# Patient Record
Sex: MALE | Race: WHITE | Employment: OTHER | URBAN - METROPOLITAN AREA
[De-identification: names, ages, dates, MRNs, and addresses within clinical notes are randomized per-mention and may not be internally consistent; named-entity substitution may affect disease eponyms.]

---

## 2017-02-27 PROBLEM — E11.65 TYPE 2 DIABETES MELLITUS WITH HYPERGLYCEMIA, WITHOUT LONG-TERM CURRENT USE OF INSULIN (HCC): Status: ACTIVE | Noted: 2017-02-27

## 2019-08-06 ENCOUNTER — HOME HEALTH ADMISSION (OUTPATIENT)
Dept: HOME HEALTH SERVICES | Facility: HOME HEALTH | Age: 54
End: 2019-08-06
Payer: COMMERCIAL

## 2019-08-06 ENCOUNTER — HOSPITAL ENCOUNTER (OUTPATIENT)
Dept: PHYSICAL THERAPY | Age: 54
Discharge: HOME | End: 2019-08-06
Payer: COMMERCIAL

## 2019-08-06 ENCOUNTER — HOSPITAL ENCOUNTER (OUTPATIENT)
Dept: SURGERY | Age: 54
Discharge: HOME | End: 2019-08-06
Payer: COMMERCIAL

## 2019-08-06 VITALS
WEIGHT: 254.1 LBS | DIASTOLIC BLOOD PRESSURE: 81 MMHG | HEIGHT: 69 IN | BODY MASS INDEX: 37.64 KG/M2 | OXYGEN SATURATION: 95 % | TEMPERATURE: 97.1 F | RESPIRATION RATE: 16 BRPM | SYSTOLIC BLOOD PRESSURE: 129 MMHG | HEART RATE: 82 BPM

## 2019-08-06 DIAGNOSIS — R06.83 SNORING: Primary | ICD-10-CM

## 2019-08-06 LAB
ANION GAP SERPL CALC-SCNC: 9 MMOL/L (ref 7–16)
APTT PPP: 30.2 SEC (ref 24.7–39.8)
ATRIAL RATE: 82 BPM
BACTERIA SPEC CULT: NORMAL
BASOPHILS # BLD: 0 K/UL (ref 0–0.2)
BASOPHILS NFR BLD: 0 % (ref 0–2)
BUN SERPL-MCNC: 12 MG/DL (ref 6–23)
CALCIUM SERPL-MCNC: 10 MG/DL (ref 8.3–10.4)
CALCULATED P AXIS, ECG09: 56 DEGREES
CALCULATED R AXIS, ECG10: 72 DEGREES
CALCULATED T AXIS, ECG11: 58 DEGREES
CHLORIDE SERPL-SCNC: 100 MMOL/L (ref 98–107)
CO2 SERPL-SCNC: 30 MMOL/L (ref 21–32)
CREAT SERPL-MCNC: 1.14 MG/DL (ref 0.8–1.5)
DIAGNOSIS, 93000: NORMAL
DIFFERENTIAL METHOD BLD: NORMAL
EOSINOPHIL # BLD: 0.2 K/UL (ref 0–0.8)
EOSINOPHIL NFR BLD: 3 % (ref 0.5–7.8)
ERYTHROCYTE [DISTWIDTH] IN BLOOD BY AUTOMATED COUNT: 12.6 % (ref 11.9–14.6)
GLUCOSE SERPL-MCNC: 166 MG/DL (ref 65–100)
HCT VFR BLD AUTO: 42.8 % (ref 41.1–50.3)
HGB BLD-MCNC: 13.9 G/DL (ref 13.6–17.2)
IMM GRANULOCYTES # BLD AUTO: 0 K/UL (ref 0–0.5)
IMM GRANULOCYTES NFR BLD AUTO: 0 % (ref 0–5)
INR PPP: 1.1
LYMPHOCYTES # BLD: 2.1 K/UL (ref 0.5–4.6)
LYMPHOCYTES NFR BLD: 23 % (ref 13–44)
MCH RBC QN AUTO: 28 PG (ref 26.1–32.9)
MCHC RBC AUTO-ENTMCNC: 32.5 G/DL (ref 31.4–35)
MCV RBC AUTO: 86.3 FL (ref 79.6–97.8)
MONOCYTES # BLD: 0.6 K/UL (ref 0.1–1.3)
MONOCYTES NFR BLD: 7 % (ref 4–12)
NEUTS SEG # BLD: 6.2 K/UL (ref 1.7–8.2)
NEUTS SEG NFR BLD: 68 % (ref 43–78)
NRBC # BLD: 0 K/UL (ref 0–0.2)
P-R INTERVAL, ECG05: 176 MS
PLATELET # BLD AUTO: 404 K/UL (ref 150–450)
PMV BLD AUTO: 9.5 FL (ref 9.4–12.3)
POTASSIUM SERPL-SCNC: 3.8 MMOL/L (ref 3.5–5.1)
PROTHROMBIN TIME: 14 SEC (ref 11.7–14.5)
Q-T INTERVAL, ECG07: 352 MS
QRS DURATION, ECG06: 92 MS
QTC CALCULATION (BEZET), ECG08: 411 MS
RBC # BLD AUTO: 4.96 M/UL (ref 4.23–5.6)
SERVICE CMNT-IMP: NORMAL
SODIUM SERPL-SCNC: 139 MMOL/L (ref 136–145)
VENTRICULAR RATE, ECG03: 82 BPM
WBC # BLD AUTO: 9.1 K/UL (ref 4.3–11.1)

## 2019-08-06 PROCEDURE — 87641 MR-STAPH DNA AMP PROBE: CPT

## 2019-08-06 PROCEDURE — 85730 THROMBOPLASTIN TIME PARTIAL: CPT

## 2019-08-06 PROCEDURE — 36415 COLL VENOUS BLD VENIPUNCTURE: CPT

## 2019-08-06 PROCEDURE — 93005 ELECTROCARDIOGRAM TRACING: CPT | Performed by: PHYSICIAN ASSISTANT

## 2019-08-06 PROCEDURE — 80048 BASIC METABOLIC PNL TOTAL CA: CPT

## 2019-08-06 PROCEDURE — 77030027138 HC INCENT SPIROMETER -A

## 2019-08-06 PROCEDURE — 85610 PROTHROMBIN TIME: CPT

## 2019-08-06 PROCEDURE — 97161 PT EVAL LOW COMPLEX 20 MIN: CPT

## 2019-08-06 PROCEDURE — 85025 COMPLETE CBC W/AUTO DIFF WBC: CPT

## 2019-08-06 RX ORDER — ACETAMINOPHEN, DIPHENHYDRAMINE HCL, PHENYLEPHRINE HCL 325; 25; 5 MG/1; MG/1; MG/1
1 TABLET ORAL
COMMUNITY

## 2019-08-06 RX ORDER — CHOLECALCIFEROL (VITAMIN D3) 125 MCG
1 CAPSULE ORAL DAILY
COMMUNITY
End: 2019-08-28

## 2019-08-06 RX ORDER — BISMUTH SUBSALICYLATE 262 MG
1 TABLET,CHEWABLE ORAL DAILY
COMMUNITY
End: 2019-08-28

## 2019-08-06 NOTE — PERIOP NOTES
Lab results within anesthesia guidelines. Lab results sent to PCP per surgeon's request.       Recent Results (from the past 12 hour(s))   CBC WITH AUTOMATED DIFF    Collection Time: 08/06/19  8:55 AM   Result Value Ref Range    WBC 9.1 4.3 - 11.1 K/uL    RBC 4.96 4.23 - 5.6 M/uL    HGB 13.9 13.6 - 17.2 g/dL    HCT 42.8 41.1 - 50.3 %    MCV 86.3 79.6 - 97.8 FL    MCH 28.0 26.1 - 32.9 PG    MCHC 32.5 31.4 - 35.0 g/dL    RDW 12.6 11.9 - 14.6 %    PLATELET 180 669 - 859 K/uL    MPV 9.5 9.4 - 12.3 FL    ABSOLUTE NRBC 0.00 0.0 - 0.2 K/uL    DF AUTOMATED      NEUTROPHILS 68 43 - 78 %    LYMPHOCYTES 23 13 - 44 %    MONOCYTES 7 4.0 - 12.0 %    EOSINOPHILS 3 0.5 - 7.8 %    BASOPHILS 0 0.0 - 2.0 %    IMMATURE GRANULOCYTES 0 0.0 - 5.0 %    ABS. NEUTROPHILS 6.2 1.7 - 8.2 K/UL    ABS. LYMPHOCYTES 2.1 0.5 - 4.6 K/UL    ABS. MONOCYTES 0.6 0.1 - 1.3 K/UL    ABS. EOSINOPHILS 0.2 0.0 - 0.8 K/UL    ABS. BASOPHILS 0.0 0.0 - 0.2 K/UL    ABS. IMM.  GRANS. 0.0 0.0 - 0.5 K/UL   METABOLIC PANEL, BASIC    Collection Time: 08/06/19  8:55 AM   Result Value Ref Range    Sodium 139 136 - 145 mmol/L    Potassium 3.8 3.5 - 5.1 mmol/L    Chloride 100 98 - 107 mmol/L    CO2 30 21 - 32 mmol/L    Anion gap 9 7 - 16 mmol/L    Glucose 166 (H) 65 - 100 mg/dL    BUN 12 6 - 23 MG/DL    Creatinine 1.14 0.8 - 1.5 MG/DL    GFR est AA >60 >60 ml/min/1.73m2    GFR est non-AA >60 >60 ml/min/1.73m2    Calcium 10.0 8.3 - 10.4 MG/DL   PROTHROMBIN TIME + INR    Collection Time: 08/06/19  8:55 AM   Result Value Ref Range    Prothrombin time 14.0 11.7 - 14.5 sec    INR 1.1     PTT    Collection Time: 08/06/19  8:55 AM   Result Value Ref Range    aPTT 30.2 24.7 - 39.8 SEC   EKG, 12 LEAD, INITIAL    Collection Time: 08/06/19 10:04 AM   Result Value Ref Range    Ventricular Rate 82 BPM    Atrial Rate 82 BPM    P-R Interval 176 ms    QRS Duration 92 ms    Q-T Interval 352 ms    QTC Calculation (Bezet) 411 ms    Calculated P Axis 56 degrees    Calculated R Axis 72 degrees Calculated T Axis 58 degrees    Diagnosis       Normal sinus rhythm  Normal ECG  When compared with ECG of 06-AUG-2019 10:03,  No significant change was found

## 2019-08-06 NOTE — PROGRESS NOTES
08/06/19 0900   Oxygen Therapy   O2 Sat (%) 96 %   Pulse via Oximetry 90 beats per minute   O2 Device Room air   Pre-Treatment   Breath Sounds Bilateral Clear;Diminished   Pre FEV1 (liters) 2.8 liters   % Predicted 76   Incentive Spirometry Treatment   Actual Volume (ml) 2500 ml   Sleep Disorder Breathing Screen:     Patient reports symptoms of:   · Snoring   · Excessive daytime sleepiness with napping  · Observed apnea  · FREIDMAN 4  · STOP-BANG __8__  · SACS Score _58___  · Height_5'9\"____ Weight__254 lbs___  · FREIDMAN 4  · PREVIOUS DIAGNOSIS OF JULIANE     Refer patient for sleep study based on above assessment. Initial respiratory Assessment completed with pt. Pt was interviewed and evaluated in Joint camp prior to surgery. Patient ID:  Marcio Kumar  [id number]  65 y.o.  1965  Surgeon: Dr. Onesimo Nickerson  Date of Surgery: 8/27/2019  Procedure: Total Right Knee Arthroplasty  Primary Care Physician: Arely Cowan [place], [place] [phone]  Specialists:                                  Pt instructed in the use of Incentive Spirometry. Pt instructed to bring Incentive Spirometer back on date of surgery & to start using Is upon return to pt room. Pt taught proper cough technique    History of smoking:   DENIES                                                      Quit date:           Secondhand smoke:DENIES      Past procedures with Oxygen desaturation:ARRYTHMIAS AFTER SURGERY    Past Medical History:   Diagnosis Date    Arrhythmia 2009-- per pt caused by dilaudid    Pt went into An arrythmia after surgery,states caused by pain meds.  does not see cardiologist.   Verba Bright Arthritis     knees, neck    Diabetes mellitus, type 2 (HCC)     Oral meds, Average BS- 155, Last A1C 7.5 on 6/20/19, denies hypoglycemia    Elevated cholesterol     GERD (gastroesophageal reflux disease)     well controlled by zantac on a prn basis    History of kidney stones 2002    only x 1    Hypertension     controlled by medication    Morbid obesity ([address]     bmi=38    PUD (peptic ulcer disease) 2003    Sleep apnea     pt does not use CPAP had uppp 2009    Testosterone deficiency     controlled by medication    Vitamin D deficiency 06/11/2014    [facility] (Level 18)                                                HX OF PSA                                                                                                     Respiratory history:DENIES SOB                                                                   Respiratory meds:  DENIES                                       FAMILY PRESENT:            SPOUSE,                                                                                        PAST SLEEP STUDY:        YES                    HX OF JULIANE:                        YES                PT HAD UPPP  BUT NEVER HAD FOLLOW UP STUDY                                     JULIANE assessment:                                               SLEEPS ON SIDE          AND              STOMACH                                                 PHYSICAL EXAM   Body mass index is 37.52 kg/m².    Visit Vitals  /81 (BP 1 Location: Right arm, BP Patient Position: At rest;Sitting)   Pulse 82   Temp 97.1 °F (36.2 °C)   Resp 16   Ht 5' 9\" (1.753 m)   Wt 115.3 kg (254 lb 1.6 oz)   SpO2 95%   BMI 37.52 kg/m²     Neck circumference:   45   cm    Loud snoring:        YES                        NOT AS LOUD AS BEFORE UPPP    Witnessed apnea or wakening gasping or choking:,            HX OF                                                                                                APNEA    Awakens with headaches:                                                  DENIES    Morning or daytime tiredness/ sleepiness:                                                                                                       TIRED   Dry mouth or sore throat in morning:                YES                                                                        Mendoza stage: 4    SACS score:58      Stop Bang   STOP-BANG  Does the patient snore loudly (louder than talking or loud enough to be heard through closed doors)?: Yes  Does the patient often feel tired, fatigued, or sleepy during the daytime, even after a \"good\" night's sleep?: Yes  Has anyone ever observed the patient stop breathing during their sleep? : Yes  Does the patient have or are they being treated for high blood pressure?: Yes  Is the patient's BMI greater than 35?: Yes  Is your neck circumference greater than 17 inches (Male) or 16 inches (Female)?: Yes  Is the patient older than 48?: Yes  Is the patient male?: Yes  JULIANE Score: 8  Has the patient been referred to Sleep Medicine?: Yes  Has the patient previously been diagnosed with Obstructive Sleep Apnea?: Yes  Treated or Untreated?: Untreated                            CPAP:                       NONE                                         O2 AT 3 HS       CONT SAT HS            Referrals:  HST  Pt.  Phone Number:  [phone]

## 2019-08-06 NOTE — PROGRESS NOTES
SW met with pt in Prehab to discuss Right TKA scheduled for 8/27/19. Pt plans to return home with spouse and HHPT. Pt resides in [facility] and is agreeable to [facility] [place]. [facility] [place] order completed. Pt has RW and does not anticipate the need for a BSC. Pt aware that SW does not need to see her post op but if needs arise he can request to meet with SW. No additional needs or questions identified at this time.   Gabriella Umaña

## 2019-08-06 NOTE — PERIOP NOTES
Patient verified name and . Order for consent found in EHR and matches case posting; patient verified. Right total knee arthroplasty    Type 3 surgery, PAT Joint assessment complete. Labs per surgeon: cbc, bmp, PT, PTT, MRSA nasal swab; results pending. Labs per anesthesia protocol: no additional lab work needed. EKG:Done today- within anesthesia guidelines. MRSA/MSSA swab collected; pharmacy to review and dose antibiotic as appropriate. Hospital approved surgical skin cleanser and instructions to return bottle on DOS given per hospital policy. Patient provided with handouts including Guide to Surgery, Pain Management, Hand Hygiene, Blood Transfusion Education, and [facility] Brochure. Patient answered medical/surgical history questions at their best of ability. All prior to admission medications documented in [facility]. Original medication prescription bottle was visualized during patient appointment. Patient instructed to hold all vitamins/supplements 7 days prior to surgery and NSAIDS 5 days prior to surgery. Patient instructed to continue previous medications as prescribed prior to surgery and to take the following medications the day of surgery according to anesthesia guidelines with a small sip of water: propanolol, rosuvastatin and testosterone gel. Patient teach back successful and patient demonstrates knowledge of instruction.

## 2019-08-06 NOTE — PROGRESS NOTES
Timur David  : [birth year](89 y.o.) Joint Annie Lobe at [address], [facility].  [phone]       Physical Therapy Prehab Plan of Treatment and Evaluation Summary:2019    ICD-10: Treatment Diagnosis:   · Pain in Right Knee (M25.561)  · Stiffness of Right Knee, Not elsewhere classified (M25.661)  · Difficulty in walking, Not elsewhere classified (R26.2)  Precautions/Allergies:   Crestor [rosuvastatin]; Dilaudid [hydromorphone (bulk)]; and Tylox [oxycodone-acetaminophen]  MEDICAL/REFERRING DIAGNOSIS:  Unilateral primary osteoarthritis, right knee [M17.11]  REFERRING PHYSICIAN: Dhiraj Vasquez,*  DATE OF SURGERY: 19    Assessment:   Comments:  Pt. Plans to go home with spouse. He needs a left tka as well. PROBLEM LIST (Impacting functional limitations):  Mr. Darling Esteban presents with the following right lower extremity(s) problems:  1. Strength  2. Range of Motion  3. Home Exercise Program  4. Pain   INTERVENTIONS PLANNED:  1. Home Exercise Program  2. Educational Discussion      TREATMENT PLAN: Effective Dates: 2019 TO 2019. Frequency/Duration: Patient to continue to perform home exercise program at least twice per day up until his surgery. GOALS: (Goals have been discussed and agreed upon with patient.)  Discharge Goals: Time Frame: 1 Day  1. Patient will demonstrate independence with a home exercise program designed to increase strength, range of motion and pain control to minimize functional deficits and optimize patient for total joint replacement. Rehabilitation Potential For Stated Goals: Good  Regarding Shruti Hamm therapy, I certify that the treatment plan above will be carried out by a therapist or under their direction.   Thank you for this referral,  Amy Walden, PT               HISTORY:   Present Symptoms:  Pain Intensity 1: (10 at worst)  Pain Location 1: Knee   History of Present Injury/Illness (Reason for Referral):  Medical/Referring Diagnosis: Unilateral primary osteoarthritis, right knee [M17.11]   Past Medical History/Comorbidities:   Mr. Fawad Moralez  has a past medical history of Arrhythmia (2009-- per pt caused by dilaudid), Arthritis, Diabetes mellitus, type 2 ([facility] [address] Elevated cholesterol, GERD (gastroesophageal reflux disease), History of kidney stones (2002), Hypertension, Morbid obesity ([facility] [address] PUD (peptic ulcer disease) (2003), Sleep apnea, Testosterone deficiency, and Vitamin D deficiency (06/11/2014). Mr. Fawad Moralez  has a past surgical history that includes hx colonoscopy (07/17/2015); pr neurological procedure unlisted (3/2011 at [facility]); hx orthopaedic; hx heent (2009); and hx gi (early 1990s).   Social History/Living Environment:   Home Environment: Private residence  # Steps to Enter: 6  Rails to Enter: No  One/Two Story Residence: One story  Living Alone: No  Support Systems: Spouse/Significant Other/Partner  Patient Expects to be Discharged to[de-identified] Private residence  Current DME Used/Available at Home: Other (comment), Cane, straight, Grab bars(high commode)  Tub or Shower Type: Tub/Shower combination  Work/Activity:  retired  Dominant Side:  RIGHT  Current Medications:  See Pre-assessment nursing note   Number of Personal Factors/Comorbidities that affect the Plan of Care: 1-2: MODERATE COMPLEXITY   EXAMINATION:   ADLs (Current Functional Status):   Ambulation:  [x] Independent  [] Walk Indoors Only  [] Walk Outdoors  [] Use Assistive Device  [] Use Wheelchair Only Dressing:  [x] [address] from Someone for:  [] Sock/Shoes  [] Pants  [] Everything   Bathing/Showering:   [x] Independent  [] Requires Assistance from Someone  [] [address]:  [] Routine house and yard work  [] Light Housework Only  [x] None   Observation/Orthostatic Postural Assessment:   Exceptions to Satmex shoulders  ROM/Flexibility:   Gross Assessment: Yes  AROM: Within functional limits(left LE)                       RLE Assessment  RLE Assessment (WDL): Exceptions to WDL  RLE AROM  R Knee Flexion: 125  R Knee Extension: 5   Strength:   Gross Assessment: Yes  Strength: Generally decreased, functional(left LE)              RLE Strength  R Knee Flexion: 4  R Knee Extension: 4   Functional Mobility:    Gross Assessment: Yes    Gait Description (WDL): Exceptions to WDL  Stand to Sit: Independent  Sit to Stand: Independent  Distance (ft): 500 Feet (ft)  Ambulation - Level of Assistance: Independent  Stance: Right decreased  Gait Abnormalities: Antalgic          Balance:    Sitting: Intact  Standing: Intact   Body Structures Involved:  1. Bones  2. Joints  3. Muscles  4. Ligaments Body Functions Affected:  1. Movement Related Activities and Participation Affected:  1. Mobility   Number of elements that affect the Plan of Care: 1-2: LOW COMPLEXITY   CLINICAL PRESENTATION:   Presentation: Stable and uncomplicated: LOW COMPLEXITY   CLINICAL DECISION MAKING:   Outcome Measure: Tool Used: Lower Extremity Functional Scale (LEFS)  Score:  Initial: 26/80 Most Recent: X/80 (Date: -- )   Interpretation of Score: 20 questions each scored on a 5 point scale with 0 representing \"extreme difficulty or unable to perform\" and 4 representing \"no difficulty\". The lower the score, the greater the functional disability. 80/80 represents no disability. Minimal detectable change is 9 points. Medical Necessity:   · Mr. Shannon Thompson is expected to optimize his lower extremity strength and ROM in preparation for joint replacement surgery. Reason for Services/Other Comments:  · Achieve baseline assesment of musculoskeletal system, functional mobility and home environment. , educate in PT HEP in preparation for surgery, educate in hospital plan of care.    Use of outcome tool(s) and clinical judgement create a POC that gives a: Clear prediction of patient's progress: LOW COMPLEXITY   TREATMENT:   Treatment/Session Assessment:  Patient was instructed in PT- HEP to increase strength and ROM in LEs. Answered all questions. · Post session pain:  Knee pain  · Compliance with Program/Exercises: compliant most of the time.   Total Treatment Duration:  PT Patient Time In/Time Out  Time In: 0845  Time Out: [facility],

## 2019-08-19 PROBLEM — R06.83 SNORING: Status: ACTIVE | Noted: 2019-08-19

## 2019-08-19 NOTE — ADVANCED PRACTICE NURSE
Total Joint Surgery Preoperative Chart Review      Patient ID:  Shayla Keller  [id number]  61 y.o.  1965  Surgeon: Dr. Thee Layton  Date of Surgery: 8/27/2019  Procedure: Total Right Knee Arthroplasty  Primary Care Physician: Mary Jane Mckee [place] [phone]  Specialty Physician(s):      Subjective:   Shayla Keller is a 53 y.o. WHITE OR  male who presents for preoperative evaluation for Total Right Knee arthroplasty. This is a preoperative chart review note based on data collected by the nurse at the surgical Pre-Assessment visit. Past Medical History:   Diagnosis Date    Arrhythmia 2009-- per pt caused by dilaudid    Pt went into An arrythmia after surgery,states caused by pain meds. does not see cardiologist.   Pricila Keron Arthritis     knees, neck    Diabetes mellitus, type 2 (HCC)     Oral meds, Average BS- 155, Last A1C 7.5 on 6/20/19, denies hypoglycemia    Elevated cholesterol     GERD (gastroesophageal reflux disease)     well controlled by zantac on a prn basis    History of kidney stones 2002    only x 1    Hypertension     controlled by medication    Morbid obesity ([address]     bmi=38    PUD (peptic ulcer disease) 2003    Sleep apnea     pt does not use CPAP had uppp 2009    Testosterone deficiency     controlled by medication    Vitamin D deficiency 06/11/2014    [facility] (Level 18)      Past Surgical History:   Procedure Laterality Date    HX COLONOSCOPY  07/17/2015    Dr. Ita Phan in 10 years.     HX GI  early 1990s    colonoscopy and EGD    HX HEENT  2009    UPPP, for obstructive sleep apnea    HX ORTHOPAEDIC      left knee scope x 2    NEUROLOGICAL PROCEDURE UNLISTED  3/2011 at [facility]    neck fusion C4-C5 with plate      Family History   Problem Relation Age of Onset    Hypertension Mother     Hypertension Father     Cancer Sister         breast    Heart Disease Sister     Stroke Sister     Malignant Hyperthermia Neg Hx     Pseudocholinesterase Deficiency Neg Hx     Delayed Awakening Neg Hx     Post-op Nausea/Vomiting Neg Hx     Emergence Delirium Neg Hx     Post-op Cognitive Dysfunction Neg Hx     Other Neg Hx     Colon Cancer Neg Hx       Social History     Tobacco Use    Smoking status: Never Smoker    Smokeless tobacco: Never Used   Substance Use Topics    Alcohol use: No       Prior to Admission medications    Medication Sig Start Date End Date Taking? Authorizing Provider   multivitamin (ONE A DAY) tablet Take 1 Tab by mouth daily. Yes Provider, Historical   TURMERIC PO Take 1,000 mg by mouth daily. Yes Provider, Historical   cholecalciferol, vitamin D3, (VITAMIN D3) 2,000 unit tab Take 1 Tab by mouth daily. Yes Provider, Historical   melatonin 10 mg tab Take 1 Tab by mouth nightly. Yes Provider, Historical   testosterone (ANDROGEL) 20.25 mg/1.25 gram (1.62 %) gel APPLY 4 PUMPS EVERYDAY  Patient taking differently: APPLY 2 PUMPS EVERY MORNING AND 2 PUMPS NIGHTLY 6/27/19  Yes Denyce Abdulaziz P, DO   Fenofibrate (LIPOFEN) 150 mg cap Take 150 mg by mouth every morning for 30 days. 6/27/19 8/6/19 Yes Denyce Abdulaziz P, DO   SITagliptin-metFORMIN (JANUMET XR) 50-1,000 mg TM24 1 p.o. twice daily 6/27/19  Yes Denyce Abdulaziz P, DO   enalapril-hydroCHLOROthiazide (VASERETIC) 10-25 mg tablet Take 1 Tab by mouth daily for 30 days. 12/20/18 8/6/19 Yes Harsha Jamison P, DO   rosuvastatin (CRESTOR) 40 mg tablet Take 1 Tab by mouth daily for 30 days. Patient taking differently: Take 20 mg by mouth daily. 12/20/18 8/6/19 Yes Denyce Abdulaziz P, DO   propranolol (INDERAL) 40 mg tablet Take 2 Tabs by mouth two (2) times a day. Patient taking differently: Take 80 mg by mouth daily. 12/20/18  Yes Denyce Abdulaziz P, DO   fluticasone (FLONASE) 50 mcg/actuation nasal spray 2 Sprays by Both Nostrils route daily. Patient taking differently: 2 Sprays by Both Nostrils route daily as needed.  2/27/17  Yes Denyce Abdulaziz P, DO   coenzyme q10 100 mg Cap Take 100 mg by mouth nightly. Indications: OTC   Yes Provider, Historical     Allergies   Allergen Reactions    Crestor [Rosuvastatin] Myalgia    Dilaudid [Hydromorphone (Bulk)] Other (comments)     Caused pt to go into A FIB for short time    Tylox [Oxycodone-Acetaminophen] Other (comments)     \"aggitation\"           Objective:     Physical Exam:   No data found. ECG:    EKG Results     Procedure 720 Value Units Date/Time    EKG, 12 LEAD, INITIAL [[id number]] Collected:  19 1004    Order Status:  Completed Updated:  19 1429     Ventricular Rate 82 BPM      Atrial Rate 82 BPM      P-R Interval 176 ms      QRS Duration 92 ms      Q-T Interval 352 ms      QTC Calculation (Bezet) 411 ms      Calculated P Axis 56 degrees      Calculated R Axis 72 degrees      Calculated T Axis 58 degrees      Diagnosis --     Normal sinus rhythm  Normal ECG  When compared with ECG of 06-AUG-2019 10:03,  No significant change was found  Confirmed by [facility]  MD (), Han Martines ([id number]) on 2019 2:29:48 PM            Data Review:   Labs:     Results for Mitchael Apgar" (MRN [id number]) as of 2019 14:17   Ref. Range 2019 08:55   Sodium Latest Ref Range: 136 - 145 mmol/L 139   Potassium Latest Ref Range: 3.5 - 5.1 mmol/L 3.8   Chloride Latest Ref Range: 98 - 107 mmol/L 100   CO2 Latest Ref Range: 21 - 32 mmol/L 30   Anion gap Latest Ref Range: 7 - 16 mmol/L 9   Glucose Latest Ref Range: 65 - 100 mg/dL 166 (H)   BUN Latest Ref Range: 6 - 23 MG/DL 12   Creatinine Latest Ref Range: 0.8 - 1.5 MG/DL 1.14   Calcium Latest Ref Range: 8.3 - 10.4 MG/DL 10.0   GFR est non-AA Latest Ref Range: >60 ml/min/1.73m2 >60   GFR est AA Latest Ref Range: >60 ml/min/1.73m2 >60     Results for Mitchael Apgar" (MRN [id number]) as of 2019 14:17   Ref.  Range 2019 09:41   Hemoglobin A1c, (calculated) Latest Ref Range: 4.8 - 5.6 % 7.5 (H)     Problem List:  )  Patient Active Problem List   Diagnosis Code    Vitamin D deficiency E55.9    Testosterone deficiency E34.9    Sleep apnea G47.30    Arthritis M19.90    Arrhythmia I49.9    Chronic kidney disease N18.9    GERD (gastroesophageal reflux disease) K21.9    Elevated cholesterol E78.00    Hypertension I10    Calculus of kidney N20.0    Morbid obesity (HCC) E66.01    Type 2 diabetes mellitus with hyperglycemia, without long-term current use of insulin (HCC) E11.65    Snoring R06.83       Total Joint Surgery Pre-Assessment Recommendations:           Patient reports the symptoms of snoring, observed apnea and /or excessive daytime sleepiness. Will refer patient for HST based on above assessment. Recommend continuous saturation monitoring hours of sleep, during hospitalization.         Signed By: Kusum Shane NPAMOR    August 19, 2019

## 2019-08-23 NOTE — H&P
[address] [facility]  History and Physical Exam    Patient ID:  Gabrielle Toscano  [id number]    50 y.o.  1965    Today: August 23, 2019    Vitals Signs: Reviewed as noted in medical record. Allergies: Allergies   Allergen Reactions    Crestor [Rosuvastatin] Myalgia    Dilaudid [Hydromorphone (Bulk)] Other (comments)     Caused pt to go into A FIB for short time    Tylox [Oxycodone-Acetaminophen] Other (comments)     \"aggitation\"        CC: right knee pain    HPI:  Pt complains of rightknee pain with difficulty ambulating. Relevant PMH:   Past Medical History:   Diagnosis Date    Arrhythmia 2009-- per pt caused by dilaudid    Pt went into An arrythmia after surgery,states caused by pain meds. does not see cardiologist.   [place] Arthritis     knees, neck    Diabetes mellitus, type 2 (HCC)     Oral meds, Average BS- 155, Last A1C 7.5 on 6/20/19, denies hypoglycemia    Elevated cholesterol     GERD (gastroesophageal reflux disease)     well controlled by zantac on a prn basis    History of kidney stones 2002    only x 1    Hypertension     controlled by medication    Morbid obesity ([facility] [address]     bmi=38    PUD (peptic ulcer disease) 2003    Sleep apnea     pt does not use CPAP had uppp 2009    Testosterone deficiency     controlled by medication    Vitamin D deficiency 06/11/2014    [facility] (Level 18)       Objective:                    HEENT: NC/AT                   Lungs:  clear                   Heart:   rrr                   Abdomen: soft                   Extremities:  Pain with rom of the right knee joint    Radiographs: reveal osteoarthritis with loss of joint space and bone spurs. Assessment: Primary osteoarthritis of right knee [M17.11]    Plan:  Proceed with scheduled Procedure(s) (LRB):  RIGHT KNEE ARTHROPLASTY TOTAL/CARY (Right) . The patient has failed conservative treatment including NSAIDS, and injections.   Due to the amount of pain the patient is experiencing we will proceed with scheduled procedure. It is also felt that the patient is high risk for postoperative complications due to history of multiple chronic medical problems.   The patient may potentially spend 2 nights in the hospital.      Signed By: JARON Moran  August 23, 2019

## 2019-08-26 ENCOUNTER — ANESTHESIA EVENT (OUTPATIENT)
Dept: SURGERY | Age: 54
DRG: 470 | End: 2019-08-26
Payer: COMMERCIAL

## 2019-08-27 ENCOUNTER — HOSPITAL ENCOUNTER (INPATIENT)
Age: 54
LOS: 1 days | Discharge: HOME HEALTH | DRG: 470 | End: 2019-08-28
Attending: ORTHOPAEDIC SURGERY | Admitting: ORTHOPAEDIC SURGERY
Payer: COMMERCIAL

## 2019-08-27 ENCOUNTER — ANESTHESIA (OUTPATIENT)
Dept: SURGERY | Age: 54
DRG: 470 | End: 2019-08-27
Payer: COMMERCIAL

## 2019-08-27 DIAGNOSIS — M17.11 ARTHRITIS OF RIGHT KNEE: Primary | ICD-10-CM

## 2019-08-27 LAB
GLUCOSE BLD STRIP.AUTO-MCNC: 172 MG/DL (ref 65–100)
HGB BLD-MCNC: 11.4 G/DL (ref 13.6–17.2)

## 2019-08-27 PROCEDURE — 65270000029 HC RM PRIVATE

## 2019-08-27 PROCEDURE — 76010000162 HC OR TIME 1.5 TO 2 HR INTENSV-TIER 1: Performed by: ORTHOPAEDIC SURGERY

## 2019-08-27 PROCEDURE — 77030006835 HC BLD SAW SAG STRY -B: Performed by: ORTHOPAEDIC SURGERY

## 2019-08-27 PROCEDURE — 77030002933 HC SUT MCRYL J&J -A: Performed by: ORTHOPAEDIC SURGERY

## 2019-08-27 PROCEDURE — 74011000250 HC RX REV CODE- 250: Performed by: ORTHOPAEDIC SURGERY

## 2019-08-27 PROCEDURE — 74011250636 HC RX REV CODE- 250/636: Performed by: ORTHOPAEDIC SURGERY

## 2019-08-27 PROCEDURE — 74011000258 HC RX REV CODE- 258: Performed by: ORTHOPAEDIC SURGERY

## 2019-08-27 PROCEDURE — 77030034849: Performed by: ORTHOPAEDIC SURGERY

## 2019-08-27 PROCEDURE — 76210000016 HC OR PH I REC 1 TO 1.5 HR: Performed by: ORTHOPAEDIC SURGERY

## 2019-08-27 PROCEDURE — 94762 N-INVAS EAR/PLS OXIMTRY CONT: CPT

## 2019-08-27 PROCEDURE — 77030011208: Performed by: ORTHOPAEDIC SURGERY

## 2019-08-27 PROCEDURE — 74011000250 HC RX REV CODE- 250: Performed by: ANESTHESIOLOGY

## 2019-08-27 PROCEDURE — 82962 GLUCOSE BLOOD TEST: CPT

## 2019-08-27 PROCEDURE — 74011250637 HC RX REV CODE- 250/637: Performed by: PHYSICIAN ASSISTANT

## 2019-08-27 PROCEDURE — 77030019557 HC ELECTRD VES SEAL MEDT -F: Performed by: ORTHOPAEDIC SURGERY

## 2019-08-27 PROCEDURE — 76060000035 HC ANESTHESIA 2 TO 2.5 HR: Performed by: ORTHOPAEDIC SURGERY

## 2019-08-27 PROCEDURE — 77030006720 HC BLD PAT RMR ZIMM -B: Performed by: ORTHOPAEDIC SURGERY

## 2019-08-27 PROCEDURE — 77030013708 HC HNDPC SUC IRR PULS STRY –B: Performed by: ORTHOPAEDIC SURGERY

## 2019-08-27 PROCEDURE — C1776 JOINT DEVICE (IMPLANTABLE): HCPCS | Performed by: ORTHOPAEDIC SURGERY

## 2019-08-27 PROCEDURE — 77030018836 HC SOL IRR NACL ICUM -A: Performed by: ORTHOPAEDIC SURGERY

## 2019-08-27 PROCEDURE — 74011250636 HC RX REV CODE- 250/636

## 2019-08-27 PROCEDURE — 77030003602 HC NDL NRV BLK BBMI -B: Performed by: ANESTHESIOLOGY

## 2019-08-27 PROCEDURE — 76010010054 HC POST OP PAIN BLOCK: Performed by: ORTHOPAEDIC SURGERY

## 2019-08-27 PROCEDURE — C1713 ANCHOR/SCREW BN/BN,TIS/BN: HCPCS | Performed by: ORTHOPAEDIC SURGERY

## 2019-08-27 PROCEDURE — 74011000250 HC RX REV CODE- 250

## 2019-08-27 PROCEDURE — 77030036688 HC BLNKT CLD THER S2SG -B

## 2019-08-27 PROCEDURE — 97535 SELF CARE MNGMENT TRAINING: CPT

## 2019-08-27 PROCEDURE — 77030007880 HC KT SPN EPDRL BBMI -B: Performed by: ANESTHESIOLOGY

## 2019-08-27 PROCEDURE — 77030002966 HC SUT PDS J&J -A: Performed by: ORTHOPAEDIC SURGERY

## 2019-08-27 PROCEDURE — 97110 THERAPEUTIC EXERCISES: CPT

## 2019-08-27 PROCEDURE — 77030040361 HC SLV COMPR DVT MDII -B

## 2019-08-27 PROCEDURE — 77030020782 HC GWN BAIR PAWS FLX 3M -B: Performed by: ANESTHESIOLOGY

## 2019-08-27 PROCEDURE — 85018 HEMOGLOBIN: CPT

## 2019-08-27 PROCEDURE — 97161 PT EVAL LOW COMPLEX 20 MIN: CPT

## 2019-08-27 PROCEDURE — 77030012935 HC DRSG AQUACEL BMS -B: Performed by: ORTHOPAEDIC SURGERY

## 2019-08-27 PROCEDURE — 36415 COLL VENOUS BLD VENIPUNCTURE: CPT

## 2019-08-27 PROCEDURE — 74011250637 HC RX REV CODE- 250/637: Performed by: ORTHOPAEDIC SURGERY

## 2019-08-27 PROCEDURE — 77030020263 HC SOL INJ SOD CL0.9% LFCR 1000ML

## 2019-08-27 PROCEDURE — 77030003665 HC NDL SPN BBMI -A: Performed by: ANESTHESIOLOGY

## 2019-08-27 PROCEDURE — 0SRC0J9 REPLACEMENT OF RIGHT KNEE JOINT WITH SYNTHETIC SUBSTITUTE, CEMENTED, OPEN APPROACH: ICD-10-PCS | Performed by: ORTHOPAEDIC SURGERY

## 2019-08-27 PROCEDURE — 97165 OT EVAL LOW COMPLEX 30 MIN: CPT

## 2019-08-27 PROCEDURE — 76942 ECHO GUIDE FOR BIOPSY: CPT | Performed by: ORTHOPAEDIC SURGERY

## 2019-08-27 PROCEDURE — 74011250637 HC RX REV CODE- 250/637: Performed by: ANESTHESIOLOGY

## 2019-08-27 PROCEDURE — 74011250636 HC RX REV CODE- 250/636: Performed by: ANESTHESIOLOGY

## 2019-08-27 PROCEDURE — 77030020256 HC SOL INJ NACL 0.9%  500ML: Performed by: ORTHOPAEDIC SURGERY

## 2019-08-27 PROCEDURE — 77030008467 HC STPLR SKN COVD -B: Performed by: ORTHOPAEDIC SURGERY

## 2019-08-27 PROCEDURE — 77030031139 HC SUT VCRL2 J&J -A: Performed by: ORTHOPAEDIC SURGERY

## 2019-08-27 PROCEDURE — 94760 N-INVAS EAR/PLS OXIMETRY 1: CPT

## 2019-08-27 DEVICE — (D)CEMENT BNE HV R 40GM -- DUPE USE ITEM 353850: Type: IMPLANTABLE DEVICE | Site: KNEE | Status: FUNCTIONAL

## 2019-08-27 DEVICE — COMPNT FEM PS CEM TRIATHLN 4 R --: Type: IMPLANTABLE DEVICE | Site: KNEE | Status: FUNCTIONAL

## 2019-08-27 DEVICE — COMPONENT PAT DIA31MM THK9MM KNEE SYMMETRIC NP PRI CEM W/O: Type: IMPLANTABLE DEVICE | Site: KNEE | Status: FUNCTIONAL

## 2019-08-27 DEVICE — BASEPLT TIB UNIV TRIATHLN 5 --: Type: IMPLANTABLE DEVICE | Site: KNEE | Status: FUNCTIONAL

## 2019-08-27 RX ORDER — KETOROLAC TROMETHAMINE 30 MG/ML
INJECTION, SOLUTION INTRAMUSCULAR; INTRAVENOUS AS NEEDED
Status: DISCONTINUED | OUTPATIENT
Start: 2019-08-27 | End: 2019-08-27 | Stop reason: HOSPADM

## 2019-08-27 RX ORDER — ONDANSETRON 4 MG/1
4 TABLET, ORALLY DISINTEGRATING ORAL
Status: DISCONTINUED | OUTPATIENT
Start: 2019-08-27 | End: 2019-08-28 | Stop reason: HOSPADM

## 2019-08-27 RX ORDER — CELECOXIB 200 MG/1
200 CAPSULE ORAL ONCE
Status: COMPLETED | OUTPATIENT
Start: 2019-08-27 | End: 2019-08-27

## 2019-08-27 RX ORDER — ROPIVACAINE HYDROCHLORIDE 2 MG/ML
INJECTION, SOLUTION EPIDURAL; INFILTRATION; PERINEURAL
Status: DISCONTINUED | OUTPATIENT
Start: 2019-08-27 | End: 2019-08-27 | Stop reason: HOSPADM

## 2019-08-27 RX ORDER — ONDANSETRON 2 MG/ML
INJECTION INTRAMUSCULAR; INTRAVENOUS AS NEEDED
Status: DISCONTINUED | OUTPATIENT
Start: 2019-08-27 | End: 2019-08-27 | Stop reason: HOSPADM

## 2019-08-27 RX ORDER — ASPIRIN 81 MG/1
81 TABLET ORAL EVERY 12 HOURS
Status: DISCONTINUED | OUTPATIENT
Start: 2019-08-27 | End: 2019-08-28 | Stop reason: HOSPADM

## 2019-08-27 RX ORDER — SODIUM CHLORIDE 0.9 % (FLUSH) 0.9 %
5-40 SYRINGE (ML) INJECTION AS NEEDED
Status: DISCONTINUED | OUTPATIENT
Start: 2019-08-27 | End: 2019-08-27 | Stop reason: HOSPADM

## 2019-08-27 RX ORDER — CELECOXIB 200 MG/1
200 CAPSULE ORAL EVERY 12 HOURS
Status: DISCONTINUED | OUTPATIENT
Start: 2019-08-27 | End: 2019-08-28 | Stop reason: HOSPADM

## 2019-08-27 RX ORDER — DIPHENHYDRAMINE HCL 25 MG
25 CAPSULE ORAL
Status: DISCONTINUED | OUTPATIENT
Start: 2019-08-27 | End: 2019-08-28 | Stop reason: HOSPADM

## 2019-08-27 RX ORDER — ENALAPRIL MALEATE AND HYDROCHLOROTHIAZIDE 10; 25 MG/1; MG/1
1 TABLET ORAL DAILY
Status: DISCONTINUED | OUTPATIENT
Start: 2019-08-28 | End: 2019-08-28 | Stop reason: HOSPADM

## 2019-08-27 RX ORDER — CEFAZOLIN SODIUM 1 G/3ML
INJECTION, POWDER, FOR SOLUTION INTRAMUSCULAR; INTRAVENOUS AS NEEDED
Status: DISCONTINUED | OUTPATIENT
Start: 2019-08-27 | End: 2019-08-27 | Stop reason: HOSPADM

## 2019-08-27 RX ORDER — NALOXONE HYDROCHLORIDE 0.4 MG/ML
.2-.4 INJECTION, SOLUTION INTRAMUSCULAR; INTRAVENOUS; SUBCUTANEOUS
Status: DISCONTINUED | OUTPATIENT
Start: 2019-08-27 | End: 2019-08-28 | Stop reason: HOSPADM

## 2019-08-27 RX ORDER — EPHEDRINE SULFATE 50 MG/ML
INJECTION, SOLUTION INTRAVENOUS AS NEEDED
Status: DISCONTINUED | OUTPATIENT
Start: 2019-08-27 | End: 2019-08-27 | Stop reason: HOSPADM

## 2019-08-27 RX ORDER — BACITRACIN 50000 [IU]/1
INJECTION, POWDER, FOR SOLUTION INTRAMUSCULAR AS NEEDED
Status: DISCONTINUED | OUTPATIENT
Start: 2019-08-27 | End: 2019-08-27 | Stop reason: HOSPADM

## 2019-08-27 RX ORDER — TRANEXAMIC ACID 100 MG/ML
INJECTION, SOLUTION INTRAVENOUS AS NEEDED
Status: DISCONTINUED | OUTPATIENT
Start: 2019-08-27 | End: 2019-08-27 | Stop reason: HOSPADM

## 2019-08-27 RX ORDER — ROPIVACAINE HYDROCHLORIDE 2 MG/ML
INJECTION, SOLUTION EPIDURAL; INFILTRATION; PERINEURAL AS NEEDED
Status: DISCONTINUED | OUTPATIENT
Start: 2019-08-27 | End: 2019-08-27 | Stop reason: HOSPADM

## 2019-08-27 RX ORDER — DEXAMETHASONE SODIUM PHOSPHATE 100 MG/10ML
10 INJECTION INTRAMUSCULAR; INTRAVENOUS ONCE
Status: DISCONTINUED | OUTPATIENT
Start: 2019-08-28 | End: 2019-08-28 | Stop reason: HOSPADM

## 2019-08-27 RX ORDER — SODIUM CHLORIDE, SODIUM LACTATE, POTASSIUM CHLORIDE, CALCIUM CHLORIDE 600; 310; 30; 20 MG/100ML; MG/100ML; MG/100ML; MG/100ML
75 INJECTION, SOLUTION INTRAVENOUS CONTINUOUS
Status: DISCONTINUED | OUTPATIENT
Start: 2019-08-27 | End: 2019-08-27 | Stop reason: HOSPADM

## 2019-08-27 RX ORDER — SODIUM CHLORIDE 0.9 % (FLUSH) 0.9 %
5-40 SYRINGE (ML) INJECTION EVERY 8 HOURS
Status: DISCONTINUED | OUTPATIENT
Start: 2019-08-27 | End: 2019-08-28 | Stop reason: HOSPADM

## 2019-08-27 RX ORDER — SODIUM CHLORIDE 0.9 % (FLUSH) 0.9 %
5-40 SYRINGE (ML) INJECTION AS NEEDED
Status: DISCONTINUED | OUTPATIENT
Start: 2019-08-27 | End: 2019-08-28 | Stop reason: HOSPADM

## 2019-08-27 RX ORDER — CEFAZOLIN SODIUM/WATER 2 G/20 ML
2 SYRINGE (ML) INTRAVENOUS EVERY 8 HOURS
Status: DISPENSED | OUTPATIENT
Start: 2019-08-27 | End: 2019-08-28

## 2019-08-27 RX ORDER — DEXAMETHASONE SODIUM PHOSPHATE 4 MG/ML
INJECTION, SOLUTION INTRA-ARTICULAR; INTRALESIONAL; INTRAMUSCULAR; INTRAVENOUS; SOFT TISSUE AS NEEDED
Status: DISCONTINUED | OUTPATIENT
Start: 2019-08-27 | End: 2019-08-27 | Stop reason: HOSPADM

## 2019-08-27 RX ORDER — VANCOMYCIN HYDROCHLORIDE 1 G/20ML
INJECTION, POWDER, LYOPHILIZED, FOR SOLUTION INTRAVENOUS AS NEEDED
Status: DISCONTINUED | OUTPATIENT
Start: 2019-08-27 | End: 2019-08-27 | Stop reason: HOSPADM

## 2019-08-27 RX ORDER — LIDOCAINE HYDROCHLORIDE 10 MG/ML
0.1 INJECTION INFILTRATION; PERINEURAL AS NEEDED
Status: DISCONTINUED | OUTPATIENT
Start: 2019-08-27 | End: 2019-08-27 | Stop reason: HOSPADM

## 2019-08-27 RX ORDER — CEFAZOLIN SODIUM/WATER 2 G/20 ML
2 SYRINGE (ML) INTRAVENOUS ONCE
Status: DISCONTINUED | OUTPATIENT
Start: 2019-08-27 | End: 2019-08-27 | Stop reason: HOSPADM

## 2019-08-27 RX ORDER — ACETAMINOPHEN 10 MG/ML
1000 INJECTION, SOLUTION INTRAVENOUS ONCE
Status: DISCONTINUED | OUTPATIENT
Start: 2019-08-27 | End: 2019-08-27

## 2019-08-27 RX ORDER — AMOXICILLIN 250 MG
2 CAPSULE ORAL DAILY
Status: DISCONTINUED | OUTPATIENT
Start: 2019-08-28 | End: 2019-08-28 | Stop reason: HOSPADM

## 2019-08-27 RX ORDER — PROPRANOLOL HYDROCHLORIDE 40 MG/1
80 TABLET ORAL DAILY
Status: DISCONTINUED | OUTPATIENT
Start: 2019-08-28 | End: 2019-08-28 | Stop reason: HOSPADM

## 2019-08-27 RX ORDER — NALOXONE HYDROCHLORIDE 0.4 MG/ML
0.2 INJECTION, SOLUTION INTRAMUSCULAR; INTRAVENOUS; SUBCUTANEOUS AS NEEDED
Status: DISCONTINUED | OUTPATIENT
Start: 2019-08-27 | End: 2019-08-27 | Stop reason: HOSPADM

## 2019-08-27 RX ORDER — FENTANYL CITRATE 50 UG/ML
INJECTION, SOLUTION INTRAMUSCULAR; INTRAVENOUS AS NEEDED
Status: DISCONTINUED | OUTPATIENT
Start: 2019-08-27 | End: 2019-08-27 | Stop reason: HOSPADM

## 2019-08-27 RX ORDER — SODIUM CHLORIDE 0.9 % (FLUSH) 0.9 %
5-40 SYRINGE (ML) INJECTION EVERY 8 HOURS
Status: DISCONTINUED | OUTPATIENT
Start: 2019-08-27 | End: 2019-08-27 | Stop reason: HOSPADM

## 2019-08-27 RX ORDER — ACETAMINOPHEN 500 MG
1000 TABLET ORAL EVERY 6 HOURS
Status: DISCONTINUED | OUTPATIENT
Start: 2019-08-28 | End: 2019-08-27

## 2019-08-27 RX ORDER — PROPOFOL 10 MG/ML
INJECTION, EMULSION INTRAVENOUS
Status: DISCONTINUED | OUTPATIENT
Start: 2019-08-27 | End: 2019-08-27 | Stop reason: HOSPADM

## 2019-08-27 RX ORDER — PROPOFOL 10 MG/ML
INJECTION, EMULSION INTRAVENOUS AS NEEDED
Status: DISCONTINUED | OUTPATIENT
Start: 2019-08-27 | End: 2019-08-27 | Stop reason: HOSPADM

## 2019-08-27 RX ORDER — MORPHINE SULFATE 10 MG/ML
6 INJECTION, SOLUTION INTRAMUSCULAR; INTRAVENOUS
Status: DISCONTINUED | OUTPATIENT
Start: 2019-08-27 | End: 2019-08-28 | Stop reason: HOSPADM

## 2019-08-27 RX ORDER — DEXAMETHASONE SODIUM PHOSPHATE 4 MG/ML
INJECTION, SOLUTION INTRA-ARTICULAR; INTRALESIONAL; INTRAMUSCULAR; INTRAVENOUS; SOFT TISSUE
Status: DISCONTINUED | OUTPATIENT
Start: 2019-08-27 | End: 2019-08-27 | Stop reason: HOSPADM

## 2019-08-27 RX ORDER — HYDROCODONE BITARTRATE AND ACETAMINOPHEN 7.5; 325 MG/1; MG/1
1 TABLET ORAL
Status: DISCONTINUED | OUTPATIENT
Start: 2019-08-27 | End: 2019-08-28 | Stop reason: HOSPADM

## 2019-08-27 RX ORDER — MIDAZOLAM HYDROCHLORIDE 1 MG/ML
INJECTION, SOLUTION INTRAMUSCULAR; INTRAVENOUS AS NEEDED
Status: DISCONTINUED | OUTPATIENT
Start: 2019-08-27 | End: 2019-08-27 | Stop reason: HOSPADM

## 2019-08-27 RX ORDER — SODIUM CHLORIDE 9 MG/ML
100 INJECTION, SOLUTION INTRAVENOUS CONTINUOUS
Status: DISCONTINUED | OUTPATIENT
Start: 2019-08-27 | End: 2019-08-28 | Stop reason: HOSPADM

## 2019-08-27 RX ORDER — MIDAZOLAM HYDROCHLORIDE 1 MG/ML
2 INJECTION, SOLUTION INTRAMUSCULAR; INTRAVENOUS
Status: COMPLETED | OUTPATIENT
Start: 2019-08-27 | End: 2019-08-27

## 2019-08-27 RX ADMIN — LIDOCAINE HYDROCHLORIDE 0.1 ML: 10 INJECTION, SOLUTION INFILTRATION; PERINEURAL at 07:16

## 2019-08-27 RX ADMIN — CELECOXIB 200 MG: 200 CAPSULE ORAL at 21:06

## 2019-08-27 RX ADMIN — MIDAZOLAM HYDROCHLORIDE 1 MG: 1 INJECTION, SOLUTION INTRAMUSCULAR; INTRAVENOUS at 09:53

## 2019-08-27 RX ADMIN — DEXAMETHASONE SODIUM PHOSPHATE 10 MG: 4 INJECTION, SOLUTION INTRA-ARTICULAR; INTRALESIONAL; INTRAMUSCULAR; INTRAVENOUS; SOFT TISSUE at 09:29

## 2019-08-27 RX ADMIN — MIDAZOLAM HYDROCHLORIDE 1 MG: 1 INJECTION, SOLUTION INTRAMUSCULAR; INTRAVENOUS at 09:54

## 2019-08-27 RX ADMIN — CEFAZOLIN SODIUM 2 G: 1 INJECTION, POWDER, FOR SOLUTION INTRAMUSCULAR; INTRAVENOUS at 09:25

## 2019-08-27 RX ADMIN — MIDAZOLAM HYDROCHLORIDE 1 MG: 1 INJECTION, SOLUTION INTRAMUSCULAR; INTRAVENOUS at 10:32

## 2019-08-27 RX ADMIN — DIPHENHYDRAMINE HYDROCHLORIDE 25 MG: 25 CAPSULE ORAL at 21:11

## 2019-08-27 RX ADMIN — TRANEXAMIC ACID 1000 MG: 100 INJECTION, SOLUTION INTRAVENOUS at 09:33

## 2019-08-27 RX ADMIN — ROPIVACAINE HYDROCHLORIDE 40 MG: 2 INJECTION, SOLUTION EPIDURAL; INFILTRATION; PERINEURAL at 09:18

## 2019-08-27 RX ADMIN — ASPIRIN 81 MG: 81 TABLET, COATED ORAL at 21:05

## 2019-08-27 RX ADMIN — SODIUM CHLORIDE, SODIUM LACTATE, POTASSIUM CHLORIDE, AND CALCIUM CHLORIDE 75 ML/HR: 600; 310; 30; 20 INJECTION, SOLUTION INTRAVENOUS at 07:16

## 2019-08-27 RX ADMIN — Medication 10 ML: at 21:18

## 2019-08-27 RX ADMIN — CELECOXIB 200 MG: 200 CAPSULE ORAL at 07:15

## 2019-08-27 RX ADMIN — MIDAZOLAM 2 MG: 1 INJECTION INTRAMUSCULAR; INTRAVENOUS at 09:14

## 2019-08-27 RX ADMIN — Medication 1 AMPULE: at 21:05

## 2019-08-27 RX ADMIN — PROPOFOL 50 MCG/KG/MIN: 10 INJECTION, EMULSION INTRAVENOUS at 09:43

## 2019-08-27 RX ADMIN — PROPOFOL 20 MG: 10 INJECTION, EMULSION INTRAVENOUS at 09:35

## 2019-08-27 RX ADMIN — MIDAZOLAM HYDROCHLORIDE 1 MG: 1 INJECTION, SOLUTION INTRAMUSCULAR; INTRAVENOUS at 10:45

## 2019-08-27 RX ADMIN — DEXAMETHASONE SODIUM PHOSPHATE 4 MG: 4 INJECTION, SOLUTION INTRA-ARTICULAR; INTRALESIONAL; INTRAMUSCULAR; INTRAVENOUS; SOFT TISSUE at 09:18

## 2019-08-27 RX ADMIN — ONDANSETRON 4 MG: 2 INJECTION INTRAMUSCULAR; INTRAVENOUS at 09:29

## 2019-08-27 RX ADMIN — Medication 3 AMPULE: at 07:15

## 2019-08-27 RX ADMIN — FENTANYL CITRATE 50 MCG: 50 INJECTION, SOLUTION INTRAMUSCULAR; INTRAVENOUS at 09:35

## 2019-08-27 RX ADMIN — SODIUM CHLORIDE, SODIUM LACTATE, POTASSIUM CHLORIDE, AND CALCIUM CHLORIDE: 600; 310; 30; 20 INJECTION, SOLUTION INTRAVENOUS at 10:06

## 2019-08-27 RX ADMIN — EPHEDRINE SULFATE 10 MG: 50 INJECTION, SOLUTION INTRAVENOUS at 09:55

## 2019-08-27 RX ADMIN — SODIUM CHLORIDE, SODIUM LACTATE, POTASSIUM CHLORIDE, AND CALCIUM CHLORIDE: 600; 310; 30; 20 INJECTION, SOLUTION INTRAVENOUS at 09:22

## 2019-08-27 RX ADMIN — FENTANYL CITRATE 50 MCG: 50 INJECTION, SOLUTION INTRAMUSCULAR; INTRAVENOUS at 09:34

## 2019-08-27 NOTE — PERIOP NOTES
TRANSFER - OUT REPORT:    Verbal report given to Lyssa Boo on Irving Israel  being transferred to [facility] for routine progression of care       Report consisted of patients Situation, Background, Assessment and   Recommendations(SBAR). Information from the following report(s) Kardex, MAR and Recent Results was reviewed with the receiving nurse. Lines:   Peripheral IV 08/27/19 Left Wrist (Active)   Site Assessment Clean, dry, & intact 8/27/2019  7:22 AM   Phlebitis Assessment 0 8/27/2019  7:22 AM   Infiltration Assessment 0 8/27/2019  7:22 AM   Dressing Status Clean, dry, & intact 8/27/2019  7:22 AM   Dressing Type Transparent;Tape 8/27/2019  7:22 AM   Hub Color/Line Status Green;Patent; Infusing 8/27/2019  7:22 AM        Opportunity for questions and clarification was provided.       Patient transported with:   [employer]

## 2019-08-27 NOTE — PROGRESS NOTES
[address]  Face to Face Encounter    Patients Name: Ronaldo Ledesma    YOB: 1965    Ordering Physician:  Juliana Cano    Primary Diagnosis: Primary osteoarthritis of right knee [M17.11]  Arthritis of right knee [M17.11]  S/p right TKA    Date of Face to Face:   8/27/2019                                  Face to Face Encounter findings are related to primary reason for home care:   yes. 1. I certify that the patient needs intermittent care as follows: physical therapy: gait/stair training    2. I certify that this patient is homebound, that is: 1) patient requires the use of a walker device, special transportation, or assistance of another to leave the home; or 2) patient's condition makes leaving the home medically contraindicated; and 3) patient has a normal inability to leave the home and leaving the home requires considerable and taxing effort. Patient may leave the home for infrequent and short duration for medical reasons, and occasional absences for non-medical reasons. Homebound status is due to the following functional limitations: Patient's ambulation limited secondary to severe pain and requires the use of an assistive device and the assistance of a caregiver for safe completion. Patient with strength and ROM deficits limiting ambulation endurance requiring the use of an assistive device and the assistance of a caregiver. Patient deemed temporarily homebound secondary to increased risk for infection when leaving home and going out into the community. 3. I certify that this patient is under my care and that I, or a nurse practitioner or [facility], or clinical nurse specialist, or certified nurse midwife, working with me, had a Face-to-Face Encounter that meets the physician Face-to-Face Encounter requirements.   The following are the clinical findings from the [address] [place] [address] encounter that support the need for skilled services and is a summary of the encounter: see [facility] chart        Arlyss Halsted, BSKATHARINE  8/27/2019      THE FOLLOWING TO BE COMPLETED BY THE COMMUNITY PHYSICIAN:    I concur with the findings described above from the F2F encounter that this patient is homebound and in need of a skilled service.     Certifying Physician: _____________________________________      Printed Certifying Physician Name: _____________________________________    Date: _________________

## 2019-08-27 NOTE — PROGRESS NOTES
Initial visit with patient by [address] [place] [address]. Card left. Fiona Velazco M.Div.

## 2019-08-27 NOTE — PROGRESS NOTES
Problem: Self Care Deficits Care Plan (Adult)  Goal: *Acute Goals and Plan of Care (Insert Text)  Description  GOALS:   DISCHARGE GOALS (in preparation for going home/rehab):  3 days  1. Mr. [place] will perform one lower body dressing activity with minimal assistance required to demonstrate improved functional mobility and safety. 2.  Mr. [place] will perform one lower body bathing activity with minimal assistance required to demonstrate improved functional mobility and safety. 3.  Mr. Octavio leigh will perform toileting/toilet transfer with contact guard assistance to demonstrate improved functional mobility and safety. 4.  Mr. [place] will perform shower transfer with contact guard assistance to demonstrate improved functional mobility and safety. JOINT CAMP OCCUPATIONAL THERAPY TKA: Initial Assessment and Daily Note 8/27/2019  INPATIENT: Hospital Day: 1  Payor: [facility] / Plan: [address] / Product Type: PPO /      NAME/AGE/GENDER: Praveen Barrientos is a 53 y.o. male   PRIMARY DIAGNOSIS:  Primary osteoarthritis of right knee [M17.11]   Procedure(s) and Anesthesia Type:     * RIGHT KNEE ARTHROPLASTY TOTAL - Spinal (Right)  ICD-10: Treatment Diagnosis:    Pain in Right Knee (M25.561)  Stiffness of Right Knee, Not elsewhere classified (A48.914)      ASSESSMENT:     Mr. [place] is s/p right TKA and presents with decreased weight bearing on R LE and decreased independence with functional mobility and activities of daily living as compared to baseline level of function and safety. Patient would benefit from skilled Occupational Therapy to maximize independence and safety with self-care task and functional mobility. Pt would also benefit from education on adaptive equipment and safety precautions in preparation for going home with spouse. Patient able to don shorts and underwear in bed. Mobilized from bed to recliner using a rolling walker. Should progress well with ADL's tomorrow.        This section established at most recent assessment   PROBLEM LIST (Impairments causing functional limitations):  Decreased Strength  Decreased ADL/Functional Activities  Decreased Transfer Abilities  Increased Pain  Increased Fatigue  Decreased Flexibility/Joint Mobility  Decreased Knowledge of Precautions   INTERVENTIONS PLANNED: (Benefits and precautions of occupational therapy have been discussed with the patient.)  Activities of daily living training  Adaptive equipment training  Balance training  Clothing management  Donning&doffing training  Theraputic activity     TREATMENT PLAN: Frequency/Duration: Follow patient 1-2tx to address above goals. Rehabilitation Potential For Stated Goals: Excellent     RECOMMENDED REHABILITATION/EQUIPMENT: (at time of discharge pending progress): Continue Skilled Therapy. OCCUPATIONAL PROFILE AND HISTORY:   History of Present Injury/Illness (Reason for Referral): Pt presents this date s/p (Right) TKA. Past Medical History/Comorbidities:   Mr. Neeta Parada  has a past medical history of Arrhythmia (2009-- per pt caused by dilaudid), Arthritis, Diabetes mellitus, type 2 ([facility] [address] Elevated cholesterol, GERD (gastroesophageal reflux disease), History of kidney stones (2002), Hypertension, Morbid obesity ([facility] [address] PUD (peptic ulcer disease) (2003), Sleep apnea, Testosterone deficiency, and Vitamin D deficiency (06/11/2014). Mr. Neeta Parada  has a past surgical history that includes hx colonoscopy (07/17/2015); pr neurological procedure unlisted (3/2011 at [facility]); hx orthopaedic; hx heent (2009); and hx gi (early 1990s). Social History/Living Environment:   Home Environment: Private residence  One/Two Story Residence: One story  Living Alone: No  Support Systems: Family member(s)  Patient Expects to be Discharged to[de-identified] Unknown  Current DME Used/Available at Home: None  Prior Level of Function/Work/Activity:  Independent prior.       Number of Personal Factors/Comorbidities that affect the Plan of Care: Brief history (0):  LOW COMPLEXITY   ASSESSMENT OF OCCUPATIONAL PERFORMANCE[de-identified]   Most Recent Physical Functioning:   Balance  Sitting: Intact  Standing: Pull to stand; With support       Gross Assessment: Yes  Gross Assessment  AROM: Within functional limits(limted R LE)  Strength: Within functional limits(limited R LE)                      Mental Status  Neurologic State: Alert  Orientation Level: Oriented X4  Cognition: Appropriate decision making  Perception: Appears intact                Basic ADLs (From Assessment) Complex ADLs (From Assessment)   Basic ADL  Feeding: Independent  Oral Facial Hygiene/Grooming: Setup  Bathing: Minimum assistance  Upper Body Dressing: Setup  Lower Body Dressing: Moderate assistance  Toileting: Minimum assistance     Grooming/Bathing/Dressing Activities of Daily Living                       Functional Transfers  Toilet Transfer : Minimum assistance  Shower Transfer: Minimum assistance     Bed/Mat Mobility  Supine to Sit: Contact guard assistance  Sit to Stand: Contact guard assistance  Bed to Chair: Contact guard assistance  Scooting: Contact guard assistance         Physical Skills Involved:  Range of Motion  Balance  Strength Cognitive Skills Affected (resulting in the inability to perform in a timely and safe manner):  WFL  Psychosocial Skills Affected:  WFL    Number of elements that affect the Plan of Care: 1-3:  LOW COMPLEXITY   CLINICAL DECISION MAKING:   Gale Nielson -PAC 6 Clicks   Daily Activity Inpatient Short Form  How much help from another person does the patient currently need. .. Total A Lot A Little None   1. Putting on and taking off regular lower body clothing? ? 1   ? 2   ? 3   ? 4   2. Bathing (including washing, rinsing, drying)? ? 1   ? 2   ? 3   ? 4   3. Toileting, which includes using toilet, bedpan or urinal?   ? 1   ? 2   ? 3   ? 4   4. Putting on and taking off regular upper body clothing?    ? 1   ? 2   ? 3   ? 4 5. Taking care of personal grooming such as brushing teeth? ? 1   ? 2   ? 3   ? 4   6. Eating meals? ? 1   ? 2   ? 3   ? 4   © 2007, Trustees of [address] [place] [address] [address] [address], under license to [employer]. All rights reserved     Score:  Initial: 18 Most Recent: X (Date: -- )    Interpretation of Tool:  Represents activities that are increasingly more difficult (i.e. Bed mobility, Transfers, Gait). Medical Necessity:     Skilled intervention continues to be required due to new TKA. Reason for Services/Other Comments:  Patient continues to require skilled intervention due to deficits above   . Use of outcome tool(s) and clinical judgement create a POC that gives a: MODERATE COMPLEXITY            TREATMENT:   (In addition to Assessment/Re-Assessment sessions the following treatments were rendered)     Pre-treatment Symptoms/Complaints:    Pain: Initial:     2 Post Session:  2     Self Care: (10): Procedure(s) (per grid) utilized to improve and/or restore self-care/home management as related to dressing, bathing, toileting and grooming. Required minimal verbal and tactile cueing to facilitate activities of daily living skills. Initial evaluation 10 mintues. Treatment/Session Assessment:     Response to Treatment:  Good, sitting up in recliner. Education:  ? Home Exercises  ? Fall Precautions  ? Hip Precautions ? Going Home Video  ? Knee/Hip Prosthesis Review  ? Walker Management/Safety ? Adaptive Equipment as Needed       Interdisciplinary Collaboration:   Physical Therapist  Occupational Therapist  Registered Nurse    After treatment position/precautions:   Up in chair  Bed/Chair-wheels locked  Caregiver at bedside  Call light within reach  RN notified     Compliance with Program/Exercises: Compliant all of the time, Will assess as treatment progresses. Recommendations/Intent for next treatment session:  Treatment next visit will focus on increasing Mr. Bourgeois's independence with bed mobility, transfers, self care, functional mobility, modalities for pain, and patient education.       Total Treatment Duration:  OT Patient Time In/Time Out  Time In: 1355  Time Out: [address], OT

## 2019-08-27 NOTE — ANESTHESIA PREPROCEDURE EVALUATION
Anesthetic History               Review of Systems / Medical History  Patient summary reviewed and pertinent labs reviewed    Pulmonary        Sleep apnea (s/p UPPP. Denies issues currently)           Neuro/Psych   Within defined limits           Cardiovascular    Hypertension: well controlled            Pertinent negatives: Dysrhythmias: Pt states he experienced post op A. fib after UPPP but none since.   Exercise tolerance: >4 METS  Comments: Denies CP, SOB or changes in functional status   GI/Hepatic/Renal     GERD: well controlled    Renal disease: stones  PUD     Endo/Other        Morbid obesity and arthritis     Other Findings              Physical Exam    Airway  Mallampati: III  TM Distance: 4 - 6 cm  Neck ROM: decreased range of motion   Mouth opening: Normal     Cardiovascular    Rhythm: regular  Rate: normal         Dental  No notable dental hx       Pulmonary  Breath sounds clear to auscultation               Abdominal  GI exam deferred       Other Findings            Anesthetic Plan    ASA: 2  Anesthesia type: spinal      Post-op pain plan if not by surgeon: peripheral nerve block single    Induction: Intravenous  Anesthetic plan and risks discussed with: Patient

## 2019-08-27 NOTE — PROGRESS NOTES
Pt up in recliner tolerating dinner well. Pain to knee controlled well. Pt voiding well. Strong push/ pulls to both feet and wiggles all toes well. inst pt to call for an needs. Wife at bedside.

## 2019-08-27 NOTE — ANESTHESIA PROCEDURE NOTES
Spinal Block    Start time: 8/27/2019 9:27 AM  End time: 8/27/2019 9:38 AM  Performed by: Fatuma Castillo MD  Authorized by: Fatuma Castillo MD     Pre-procedure:   Indications: primary anesthetic  Preanesthetic Checklist: patient identified, risks and benefits discussed, anesthesia consent, site marked, patient being monitored and timeout performed    Timeout Time: 09:27          Spinal Block:   Patient Position:  Seated  Prep Region:  Lumbar  Prep: chlorhexidine      Location:  L3-4  Technique:  Single shot    Local Dose (mL):  1    Needle:   Needle Type:  Quincke  Needle Gauge:  22 G  Attempts:  3      Events: CSF confirmed, no blood with aspiration and no paresthesia        Assessment:  Insertion:  Uncomplicated  Patient tolerance:  Patient tolerated the procedure well with no immediate complications  Anesthetic medications:  Isobaric mepivacaine, 60 mg    Two attempts per Rachelle Robison with 25 Pencan, one pass by Maeve with 22g Quincke

## 2019-08-27 NOTE — ROUTINE PROCESS
Teach back method used with patient concerning hibiclens wash, TB screening, incentive spirometer( 2500 demonstrated preop), and pain management goals.  Patient and family were provided with home discharge needs list.

## 2019-08-27 NOTE — PROGRESS NOTES
HCPOA request received. Assisted patient in completing HCPOA and answered all questions. Original returned, copy placed on chart and additional copies given. Will continue to support as needed. Ada Irene M.Div.

## 2019-08-27 NOTE — OP NOTES
[facility]  Total Knee Arthroplasty  Patient:Александр Mahan   : 1965  Medical Record Number:[id number]      Pre-operative Diagnosis:  Primary osteoarthritis of right knee [M17.11]  Post-operative Diagnosis: Primary osteoarthritis of right knee [M17.11]  Location: [place] [address]    Date of Procedure: 2019  Surgeon: Lucio Wright MD  Assistant:  JARON Sandoval    Anesthesia: Spinal and  nerve block    Procedure:  Right Posterior Stabilized Total Knee Arthroplasty with use of Bone Cement    Tourniquet Time: none    EBL:  300 cc     The complexity of the total joint surgery requires the use of a first assistant for positioning, retraction and assistance in closure. Asenath Post was brought to the operating room, positioned on the operating room table, and after appropriate identification  was anethestized. A gannon catheter was placed preoperatively and IV antibiotics were administered, along with IV transexamic acid. The limb was prepped and draped in the usual sterile fashion. Prior to the incision being made a timeout was called identifying the patient, procedure ,operative side and surgeon. An anterior longitudinal incision was accomplished just medial to the tibial tubercle and extending approximal 6 centimeters proximal to the superior pole of the patella. A medial parapatellar capsular incision was performed. The medial capsular flap was elevated around to the insertion of the semimembranous tendon. The patella was everted and the knee flexed and externally rotated. The articular surface revealed cartilage loss with exposed bone and bone spurs throughout all three compartments. The medial and lateral menisci were excised. The lateral half of the fat pad excised and the patella femoral ligament was released. The anterior cruciate ligament and the posterior cruciate ligament were resected.   Using extramedullary instrumentation, the tibial cut was accomplished with appropriate posterior slope. Approximately 6 mm of bone was removed from the high side of the tibia. The distal femur was addressed next. A drill hole was made above the intracondylar notch. Using appropriate intramedullary instrumentation, an appropriate valgus distal cut was accomplished. The femur was sized to a 4 component. The anterior and posterior cuts and anterior and posterior chamfer cuts were then made about the distal femur. Osteophytes were removed from the tibial and femoral surfaces. The appropriate cutting blocks was then utilized to perform the notch cut, with appropriate lateral translation accomplished for the patellofemoral groove. The tibia was sized to a 5 component. The tibial base plate was pinned into place with  appropriate external rotation and the stem site prepared. A preliminary range of motion was accomplished with the above size trial components. A 14 millimeter polyethylene insert allowed the patient to obtain full extension as well as appropriate flexion. Additional surgical releases were none. .  The patient's ligaments were stable in flexion and extension to medial and lateral stressing and alignment was through the appropriate mechanical axis. The patella was then everted. The bone was resected to accomodate a size 31 patella button. A trial reduction revealed appropriate tracking through the patellofemoral groove with no lateral retinacular release accomplished. All trial components were removed and the surfaces were prepared for cementing with irrigation and debridement of the bone interstices. The posterior capsule and periosteum and soft tissues were injected for postop pain management. One package of cement  was mixed and the permanent components cemented into place. The femoral and tibial components were pressurized in full extension as well as 70 degrees of flexion. The patella component was pressurized using the patella clamp. Excess cement was removed using a curette. Once the cement was hardened, the patella clamp was removed and the knee was copiously irrigated. A lavage of diluted betadine solution of 17.5 ml Betadine in 500 of 0.9% Normal Saline was allowed to soak in the wound for 3 minutes after implanting of the prosthesis. The wound was irrigated with Saline again before closure. Prior to the final skin closure, full strength betadine was applied to the skin surrounding the skin incision. After fascial closure the knee was injected with a solution of 1 gram of TXA and 1 gram of Vancomycin powder. Alvy Parrot knee was placed through a range of motion and noted to be stable as mentioned above with the trial components. The operative knee was injected prior to closure for post op pain management. The capsular layer was closed using a #1 PDS suture, while the subcutaneous layers were closed using a 2-0 Monocryl interrupted suture. The skin was closed using staples and a sterile bandage was applied. A cryo pad was applied on the operative leg. The sponge count and needle counts were correct. Implants:   Implant Name Type Inv.  Item Serial No.  Lot No. LRB No. Used   CEMENT BNE HV R 40GM -- PALACOS R [id number] - [id number]  CEMENT BNE HV R 40GM -- PALACOS R [id number] [facility] [id number] Right 1   COMPNT FEM PS FELICITAS TRIATHLN 4 R --  - [id number]  COMPNT FEM PS FELICITAS TRIATHLN 4 R --  [id number] [employer] HOW [id number] Right 1   BASEPLT TIB UNIV TRIATHLN 5 --  - [id number]  BASEPLT TIB UNIV TRIATHLN 5 --  [id number] [employer] HOW [id number] Right 1   COMPNT PAT SYM TRIATHLN 31X9MM --  - [id number]  COMPNT PAT SYM TRIATHLN 31X9MM --  [id number] [employer] HOW [id number] Right 1   14MM TRIATHLON BEARING INSERT-PS SIZE 5   [id number]  [id number] Right 1     Signed By: Jania Jorge MD

## 2019-08-27 NOTE — H&P
The patient has end stage arthritis of the right knee joint. The patient was seen and examined and there are no changes to the patient's orthopedic condition. They have tried conservative treatment for this condition; including antiinflammatories and lifestyle modifications and have failed. The necessity for the joint replacement is still present, and the H&P from the office is still current. The patient will be admitted today forProcedure(s) (LRB):  RIGHT KNEE ARTHROPLASTY TOTAL/CARY (Right) .

## 2019-08-27 NOTE — PROGRESS NOTES
Problem: Mobility Impaired (Adult and Pediatric)  Goal: *Acute Goals and Plan of Care (Insert Text)  Description  GOALS (1-4 days):  (1.)Mr. Archana Blue will move from supine to sit and sit to supine  in bed with SUPERVISION. (2.)Mr. Archana Blue will transfer from bed to chair and chair to bed with SUPERVISION using the least restrictive device. (3.)Mr. Archana Blue will ambulate with SUPERVISION for 200 feet with the least restrictive device. (4.)Mr. Archana Blue will ambulate up/down 3 steps with bilateral  railing with STAND BY ASSIST with no device. (5.)Mr. Archana Blue will increase right knee ROM to 5°-80°.  ________________________________________________________________________________________________   Outcome: Progressing Towards Goal     PHYSICAL THERAPY JOINT CAMP TKA: Initial Assessment and PM 8/27/2019  INPATIENT: Hospital Day: 1  Payor: [address] [place] [address] / Plan: [address] / Product Type: PPO /      NAME/AGE/GENDER: Binta Murdock is a 53 y.o. male   PRIMARY DIAGNOSIS:  Primary osteoarthritis of right knee [M17.11]   Procedure(s) and Anesthesia Type:     * RIGHT KNEE ARTHROPLASTY TOTAL - Spinal (Right)  ICD-10: Treatment Diagnosis:    Pain in Right Knee (M25.561)  Stiffness of Right Knee, Not elsewhere classified (M25.661)  Difficulty in walking, Not elsewhere classified (R26.2)      ASSESSMENT:     Mr. Archana Blue presents with limited ROM and strength following his R TKA. He participated well and will benefit from PT to increase his functional independence with gait and transfers. He plans on returning home with HHPT and support of his wife. He ambulated in the halls then returned to chair to perform therex.      This section established at most recent assessment   PROBLEM LIST (Impairments causing functional limitations):  Decreased Strength  Decreased Transfer Abilities  Decreased Ambulation Ability/Technique  Decreased Balance  Increased Pain  Decreased Flexibility/Joint Mobility   INTERVENTIONS PLANNED: (Benefits and precautions of physical therapy have been discussed with the patient.)  Cold  bed mobility  gait training  home exercise program (HEP)  Range of Motion: active/assisted/passive  Therapeutic Activities  therapeutic exercise/strengthening  transfer training  Group Therapy     TREATMENT PLAN: Frequency/Duration: Follow patient BID for duration of hospital stay to address above goals. Rehabilitation Potential For Stated Goals: Excellent     RECOMMENDED REHABILITATION/EQUIPMENT: (at time of discharge pending progress): Continue Skilled Therapy and Home Health: Physical Therapy. HISTORY:   History of Present Injury/Illness (Reason for Referral): Admitted for R TKA. Needs his L knee replaced as well. Past Medical History/Comorbidities:   Mr. Andrew Huber  has a past medical history of Arrhythmia (2009-- per pt caused by dilaudid), Arthritis, Diabetes mellitus, type 2 ([facility] [address] Elevated cholesterol, GERD (gastroesophageal reflux disease), History of kidney stones (2002), Hypertension, Morbid obesity ([facility] [address] PUD (peptic ulcer disease) (2003), Sleep apnea, Testosterone deficiency, and Vitamin D deficiency (06/11/2014). Mr. Andrew Huber  has a past surgical history that includes hx colonoscopy (07/17/2015); pr neurological procedure unlisted (3/2011 at [facility]); hx orthopaedic; hx heent (2009); and hx gi (early 1990s). Social History/Living Environment:   Home Environment: Other (comment)  One/Two Story Residence: One story  Living Alone: No  Support Systems: Family member(s)  Patient Expects to be Discharged to[de-identified] Unknown  Current DME Used/Available at Home: None  Prior Level of Function/Work/Activity:  Independent prior to admit. Number of Personal Factors/Comorbidities that affect the Plan of Care: 0: LOW COMPLEXITY   EXAMINATION:   Most Recent Physical Functioning:   Gross Assessment: Yes  Gross Assessment  AROM: Within functional limits(limted R LE)  Strength:  Within functional limits(limited R LE) Bed Mobility  Supine to Sit: Contact guard assistance  Scooting: Contact guard assistance    Transfers  Sit to Stand: Contact guard assistance  Stand Pivot Transfers: Contact guard assistance  Bed to Chair: Contact guard assistance    Balance  Sitting: Intact  Standing: Pull to stand; With support              Weight Bearing Status  Right Side Weight Bearing: As tolerated  Distance (ft): 80 Feet (ft)  Ambulation - Level of Assistance: Contact guard assistance  Assistive Device: Walker, rolling  Speed/Lluvia: Pace decreased (<100 feet/min)  Step Length: Left shortened  Stance: Right decreased  Gait Abnormalities: Antalgic;Decreased step clearance  Interventions: Safety awareness training     Braces/Orthotics: none    Right Knee Cold  Type: Cryocuff      Body Structures Involved:  Joints  Muscles Body Functions Affected: Movement Related Activities and Participation Affected: Mobility   Number of elements that affect the Plan of Care: 4+: HIGH COMPLEXITY   CLINICAL PRESENTATION:   Presentation: Stable and uncomplicated: LOW COMPLEXITY   CLINICAL DECISION MAKIN[address] [place] [id number] AM-PAC 6 Clicks   Basic Mobility Inpatient Short Form  How much difficulty does the patient currently have. .. Unable A Lot A Little None   1. Turning over in bed (including adjusting bedclothes, sheets and blankets)? ? 1   ? 2   ? 3   ? 4   2. Sitting down on and standing up from a chair with arms ( e.g., wheelchair, bedside commode, etc.)   ? 1   ? 2   ? 3   ? 4   3. Moving from lying on back to sitting on the side of the bed?   ? 1   ? 2   ? 3   ? 4   How much help from another person does the patient currently need. .. Total A Lot A Little None   4. Moving to and from a bed to a chair (including a wheelchair)? ? 1   ? 2   ? 3   ? 4   5. Need to walk in hospital room? ? 1   ? 2   ? 3   ? 4   6. Climbing 3-5 steps with a railing?    ? 1   ? 2   ? 3   ? 4   © 2007, Trustees of [address] [place] [address] [address] [id number], under license to [employer]. All rights reserved     Score:  Initial: 18 Most Recent: X (Date: -- )    Interpretation of Tool:  Represents activities that are increasingly more difficult (i.e. Bed mobility, Transfers, Gait). Medical Necessity:     Patient is expected to demonstrate progress in strength, range of motion and balance   to increase independence with gait and transfers   . Reason for Services/Other Comments:  Patient continues to require skilled intervention due to limited functional independence   . Use of outcome tool(s) and clinical judgement create a POC that gives a: Clear prediction of patient's progress: LOW COMPLEXITY            TREATMENT:   (In addition to Assessment/Re-Assessment sessions the following treatments were rendered)     Pre-treatment Symptoms/Complaints:  none  Pain: Initial:   Pain Intensity 1: 0  Post Session:  0     Therapeutic Exercise: (10 Minutes):  Exercises per grid below to improve mobility and strength. Required minimal verbal cues to promote proper body alignment. Date:  8/27 Date:   Date:     ACTIVITY/EXERCISE AM PM AM PM AM PM   GROUP THERAPY  ?  ?  ?  ?  ?  ? Ankle Pumps  10       Quad Sets  10       Gluteal Sets  10       Hip ABd/ADduction  10       Straight Leg Raises  10       Knee Slides  10       Short Arc Quads         Long Arc Quads         Chair Slides                  B = bilateral; AA = active assistive; A = active; P = passive      Treatment/Session Assessment:     Response to Treatment:  tolerated well . Education:  ? Home Exercises  ? Fall Precautions  ? Hip Precautions ? D/C Instruction Review  ? Knee/Hip Prosthesis Review  ? Walker Management/Safety ?  Adaptive Equipment as Needed       Interdisciplinary Collaboration:   Physical Therapist  Occupational Therapist  Registered Nurse    After treatment position/precautions:   Up in chair  Bed/Chair-wheels locked  Bed in low position  Call light within reach  RN notified  Family at bedside    Compliance with Program/Exercises: Compliant all of the time. Recommendations/Intent for next treatment session:  Treatment next visit will focus on increasing Mr. Bourgeois's independence with bed mobility, transfers, gait training, strength/ROM exercises, modalities for pain, and patient education.       Total Treatment Duration:  PT Patient Time In/Time Out  Time In: 1335  Time Out: Jeanie 49, PT

## 2019-08-27 NOTE — PROGRESS NOTES
Care Management Interventions  PCP Verified by CM: Yes  Mode of Transport at Discharge: Self  Transition of Care Consult (CM Consult): [address]: Yes  Physical Therapy Consult: Yes  Occupational Therapy Consult: Yes  Current Support Network: Lives with Spouse  Confirm Follow Up Transport: Family  Plan discussed with Pt/Family/Caregiver: Yes  Freedom of Choice Offered: Yes  Discharge Location  Discharge Placement: Home with home health    Patient is a 53y.o. year old male admitted for Right TKA . Patient lives with His spouse and plans to return home on discharge. Order received to arrange home health. Patient without preference towards agency. Referral sent to [facility]. Patient denies any equipment needs as he has a walker and bedside commode. Will follow until discharge.

## 2019-08-27 NOTE — PROGRESS NOTES
TRANSFER - IN REPORT:    Verbal report received from PACU(name) on Jose Douglas  being received from PACU(unit) for routine progression of care      Report consisted of patients Situation, Background, Assessment and   Recommendations(SBAR). Information from the following report(s) SBAR, Procedure Summary, Intake/Output, MAR and Recent Results was reviewed with the receiving nurse. Opportunity for questions and clarification was provided. Assessment completed upon patients arrival to unit and care assumed.

## 2019-08-27 NOTE — PROGRESS NOTES
08/27/19 1535   Oxygen Therapy   O2 Sat (%) 95 %   Pulse via Oximetry 68 beats per minute   O2 Device Room air   O2 Flow Rate (L/min) 0 l/min   Incentive Spirometry Treatment   Actual Volume (ml) 2500 ml   Number of Attempts 1   No shortness of breath or distress noted. BS are clear b/l.    [facility] notes reviewed- continuous sat #10 and 3 L ordered HS

## 2019-08-27 NOTE — PERIOP NOTES
TRANSFER - IN REPORT:    Verbal report received from Tin Mcdermott 38 RN(name) on Jose Douglas  being received from [facility] [place](unit) for routine progression of care      Report consisted of patients Situation, Background, Assessment and   Recommendations(SBAR). Information from the following report(s) SBAR, Kardex and MAR was reviewed with the receiving nurse. Opportunity for questions and clarification was provided. Assessment completed upon patients arrival to unit and care assumed.

## 2019-08-27 NOTE — PERIOP NOTES
Betadine lavage:  17.5cc of betadine lot # [id number] , exp. Date  ,  in 500cc of . 9NS Lot # [id number] , exp.  Date : 01-

## 2019-08-27 NOTE — ANESTHESIA PROCEDURE NOTES
Right Adductor Canal Block    Start time: 8/27/2019 9:14 AM  End time: 8/27/2019 9:18 AM  Performed by: Vilma Anderson MD  Authorized by: Vilma Anderson MD       Pre-procedure: Indications: at surgeon's request and post-op pain management    Preanesthetic Checklist: patient identified, risks and benefits discussed, site marked, timeout performed, anesthesia consent given and patient being monitored    Timeout Time: 09:14  Preanesthetic Checklist comment:  Time out at    Block Type:   Block Type: Adductor canal  Laterality:  Right  Monitoring:  Responsive to questions, standard ASA monitoring, continuous pulse ox, oxygen, frequent vital sign checks and heart rate  Injection Technique:  Single shot  Prep: chlorhexidine    Location:  Mid thigh  Needle Type:  Stimuplex  Needle Gauge:  21 G  Needle Localization:  Anatomical landmarks and ultrasound guidance    Assessment:  Number of attempts:  1  Injection Assessment:  Incremental injection every 5 mL, no paresthesia, ultrasound image on chart, local visualized surrounding nerve on ultrasound, negative aspiration for blood and no intravascular symptoms  Patient tolerance:  Patient tolerated the procedure well with no immediate complications  The relevant block area was scanned before, during, and after the local anesthetic injection and no gross abnormalities were observed.

## 2019-08-27 NOTE — ANESTHESIA POSTPROCEDURE EVALUATION
Procedure(s):  RIGHT KNEE ARTHROPLASTY TOTAL. No value filed.     Anesthesia Post Evaluation      Multimodal analgesia: multimodal analgesia used between 6 hours prior to anesthesia start to PACU discharge  Patient location during evaluation: PACU  Patient participation: complete - patient participated  Level of consciousness: awake and alert  Pain management: adequate  Airway patency: patent  Anesthetic complications: no  Cardiovascular status: acceptable  Respiratory status: acceptable  Hydration status: acceptable  Post anesthesia nausea and vomiting:  none      Vitals Value Taken Time   /79 8/27/2019 12:16 PM   Temp 36.2 °C (97.1 °F) 8/27/2019 11:23 AM   Pulse 86 8/27/2019 12:16 PM   Resp 16 8/27/2019 12:16 PM   SpO2 94 % 8/27/2019 12:16 PM

## 2019-08-28 VITALS
DIASTOLIC BLOOD PRESSURE: 66 MMHG | HEIGHT: 69 IN | SYSTOLIC BLOOD PRESSURE: 127 MMHG | WEIGHT: 254.1 LBS | OXYGEN SATURATION: 96 % | HEART RATE: 110 BPM | RESPIRATION RATE: 16 BRPM | TEMPERATURE: 97.9 F | BODY MASS INDEX: 37.64 KG/M2

## 2019-08-28 LAB — HGB BLD-MCNC: 11.1 G/DL (ref 13.6–17.2)

## 2019-08-28 PROCEDURE — 97110 THERAPEUTIC EXERCISES: CPT

## 2019-08-28 PROCEDURE — 74011250636 HC RX REV CODE- 250/636: Performed by: PHYSICIAN ASSISTANT

## 2019-08-28 PROCEDURE — 97535 SELF CARE MNGMENT TRAINING: CPT

## 2019-08-28 PROCEDURE — 97150 GROUP THERAPEUTIC PROCEDURES: CPT

## 2019-08-28 PROCEDURE — 97116 GAIT TRAINING THERAPY: CPT

## 2019-08-28 PROCEDURE — 85018 HEMOGLOBIN: CPT

## 2019-08-28 PROCEDURE — 74011250637 HC RX REV CODE- 250/637: Performed by: ORTHOPAEDIC SURGERY

## 2019-08-28 PROCEDURE — 74011250637 HC RX REV CODE- 250/637: Performed by: PHYSICIAN ASSISTANT

## 2019-08-28 PROCEDURE — 36415 COLL VENOUS BLD VENIPUNCTURE: CPT

## 2019-08-28 RX ORDER — HYDROCODONE BITARTRATE AND ACETAMINOPHEN 7.5; 325 MG/1; MG/1
1 TABLET ORAL
Qty: 50 TAB | Refills: 0 | Status: SHIPPED | OUTPATIENT
Start: 2019-08-28 | End: 2019-09-06

## 2019-08-28 RX ORDER — ASPIRIN 81 MG/1
81 TABLET ORAL EVERY 12 HOURS
Qty: 60 TAB | Refills: 0 | Status: SHIPPED | OUTPATIENT
Start: 2019-08-28 | End: 2019-11-27

## 2019-08-28 RX ADMIN — CELECOXIB 200 MG: 200 CAPSULE ORAL at 09:22

## 2019-08-28 RX ADMIN — HYDROCODONE BITARTRATE AND ACETAMINOPHEN 1 TABLET: 7.5; 325 TABLET ORAL at 09:29

## 2019-08-28 RX ADMIN — Medication 1 AMPULE: at 09:25

## 2019-08-28 RX ADMIN — HYDROCODONE BITARTRATE AND ACETAMINOPHEN 1 TABLET: 7.5; 325 TABLET ORAL at 01:15

## 2019-08-28 RX ADMIN — HYDROCODONE BITARTRATE AND ACETAMINOPHEN 1 TABLET: 7.5; 325 TABLET ORAL at 06:12

## 2019-08-28 RX ADMIN — ASPIRIN 81 MG: 81 TABLET, COATED ORAL at 09:23

## 2019-08-28 RX ADMIN — PROPRANOLOL HYDROCHLORIDE 80 MG: 40 TABLET ORAL at 09:00

## 2019-08-28 RX ADMIN — Medication 10 ML: at 06:08

## 2019-08-28 RX ADMIN — HYDROCODONE BITARTRATE AND ACETAMINOPHEN 1 TABLET: 7.5; 325 TABLET ORAL at 14:07

## 2019-08-28 RX ADMIN — ENALAPRIL MALEATE AND HYDROCHLOROTHIAZIDE 1 TABLET: 10; 25 TABLET ORAL at 09:00

## 2019-08-28 RX ADMIN — Medication 2 G: at 01:07

## 2019-08-28 RX ADMIN — SENNOSIDES AND DOCUSATE SODIUM 2 TABLET: 8.6; 5 TABLET ORAL at 09:22

## 2019-08-28 NOTE — PROGRESS NOTES
Problem: Self Care Deficits Care Plan (Adult)  Goal: *Acute Goals and Plan of Care (Insert Text)  Description  GOALS:   DISCHARGE GOALS (in preparation for going home/rehab):  3 days  1. Mr. Chloe Jama will perform one lower body dressing activity with minimal assistance required to demonstrate improved functional mobility and safety. -GOAL MET 8/28/2019   2. Mr. Chloe Jama will perform one lower body bathing activity with minimal assistance required to demonstrate improved functional mobility and safety. -GOAL MET 8/28/2019   3. Mr. Chloe Jama will perform toileting/toilet transfer with contact guard assistance to demonstrate improved functional mobility and safety. -GOAL MET 8/28/2019   4. Mr. Chloe Jama will perform shower transfer with contact guard assistance to demonstrate improved functional mobility and safety. -GOAL MET 8/28/2019   5. Addendum goal, patient has a tub/shower and interested in transfer, goal set to supervision. -GOAL MET 8/28/2019        JOINT CAMP OCCUPATIONAL THERAPY TKA: Daily Note 8/28/2019  INPATIENT: Hospital Day: 2  Payor: [facility] / Plan: [address] [facility] / Product Type: PPO /      NAME/AGE/GENDER: Matthew Bullock is a 53 y.o. male   PRIMARY DIAGNOSIS:  Primary osteoarthritis of right knee [M17.11]   Procedure(s) and Anesthesia Type:     * RIGHT KNEE ARTHROPLASTY TOTAL - Spinal (Right)  ICD-10: Treatment Diagnosis:    · Pain in Right Knee (M25.561)  · Stiffness of Right Knee, Not elsewhere classified (M25.661)      ASSESSMENT:     Patient able to complete tub transfers using alteral step over with supervision. All goals met. Will do well at home for self cares and transfers during ADL's. Will see for tub/shower transfer training than d/c. This section established at most recent assessment   PROBLEM LIST (Impairments causing functional limitations):  1. Decreased Strength  2. Decreased ADL/Functional Activities  3. Decreased Transfer Abilities  4.  Increased Pain  5. Increased Fatigue  6. Decreased Flexibility/Joint Mobility  7. Decreased Knowledge of Precautions   INTERVENTIONS PLANNED: (Benefits and precautions of occupational therapy have been discussed with the patient.)  1. Activities of daily living training  2. Adaptive equipment training  3. Balance training  4. Clothing management  5. Donning&doffing training  6. Theraputic activity     TREATMENT PLAN: Frequency/Duration: Follow patient 1-2tx to address above goals. Rehabilitation Potential For Stated Goals: Excellent     RECOMMENDED REHABILITATION/EQUIPMENT: (at time of discharge pending progress): Continue Skilled Therapy. OCCUPATIONAL PROFILE AND HISTORY:   History of Present Injury/Illness (Reason for Referral): Pt presents this date s/p (Right) TKA. Past Medical History/Comorbidities:   Mr. Anisha Jolly  has a past medical history of Arrhythmia (2009-- per pt caused by dilaudid), Arthritis, Diabetes mellitus, type 2 ([address] Elevated cholesterol, GERD (gastroesophageal reflux disease), History of kidney stones (2002), Hypertension, Morbid obesity ([facility] [address] PUD (peptic ulcer disease) (2003), Sleep apnea, Testosterone deficiency, and Vitamin D deficiency (06/11/2014). Mr. Anisha Jolly  has a past surgical history that includes hx colonoscopy (07/17/2015); pr neurological procedure unlisted (3/2011 at [facility]); hx orthopaedic; hx heent (2009); and hx gi (early 1990s). Social History/Living Environment:   Home Environment: Private residence  One/Two Story Residence: One story  Living Alone: No  Support Systems: Family member(s)  Patient Expects to be Discharged to[de-identified] Unknown  Current DME Used/Available at Home: None  Prior Level of Function/Work/Activity:  Independent prior.       Number of Personal Factors/Comorbidities that affect the Plan of Care: Brief history (0):  LOW COMPLEXITY   ASSESSMENT OF OCCUPATIONAL PERFORMANCE[de-identified]   Most Recent Physical Functioning:   Balance  Sitting: Intact  Standing: With support       Gross Assessment  AROM: Within functional limits(L LE)  Strength: Within functional limits(L LE)            Coordination  Fine Motor Skills-Upper: Left Intact; Right Intact  Gross Motor Skills-Upper: Left Intact; Right Intact         Mental Status  Neurologic State: Alert  Orientation Level: Oriented X4  Cognition: Appropriate decision making; Appropriate for age attention/concentration; Appropriate safety awareness  Perception: Appears intact          RLE Strength  R Hip Flexion: 2+  R Knee Flexion: 2+  R Knee Extension: 2+     Basic ADLs (From Assessment) Complex ADLs (From Assessment)   Basic ADL  Feeding: Independent  Oral Facial Hygiene/Grooming: Setup  Bathing: Supervision  Type of Bath: Chlorhexidine (CHG), Full, Shower  Upper Body Dressing: Setup  Lower Body Dressing: Stand-by assistance  Toileting: Stand by assistance     Grooming/Bathing/Dressing Activities of Daily Living   Grooming  Grooming Assistance: Independent     Upper Body Bathing  Bathing Assistance: Set-up     Lower Body Bathing  Bathing Assistance: Stand-by assistance     Upper Body Dressing Assistance  Dressing Assistance: Set-up Functional Transfers  Bathroom Mobility: Supervision/set up  Toilet Transfer : Supervision  Tub Transfer: Supervision  Shower Transfer: Supervision   Lower Body Dressing Assistance  Dressing Assistance: Stand-by assistance  Underpants: Stand-by assistance  Pants With Elastic Waist: Stand-by assistance  Socks: Stand-by assistance Bed/Mat Mobility  Supine to Sit: Stand-by assistance  Sit to Stand: Stand-by assistance  Stand to Sit: Stand-by assistance  Bed to Chair: Supervision  Scooting: Stand-by assistance         Physical Skills Involved:  1. Range of Motion  2. Balance  3. Strength Cognitive Skills Affected (resulting in the inability to perform in a timely and safe manner):  1. [facility]  Psychosocial Skills Affected:  1.  WFL    Number of elements that affect the Plan of Care: 1-3:  LOW COMPLEXITY   CLINICAL DECISION MAKIN[address] [place] [address] AM-PAC 6 Clicks   Daily Activity Inpatient Short Form  How much help from another person does the patient currently need. .. Total A Lot A Little None   1. Putting on and taking off regular lower body clothing? ? 1    2   x? 3   ? 4   2. Bathing (including washing, rinsing, drying)? ? 1    2   x? 3   ? 4   3. Toileting, which includes using toilet, bedpan or urinal?   ? 1    2   x? 3   ? 4   4. Putting on and taking off regular upper body clothing? ? 1   ? 2   ? 3   ? 4   5. Taking care of personal grooming such as brushing teeth? ? 1   ? 2   ? 3   ? 4   6. Eating meals? ? 1   ? 2   ? 3   ? 4   © , Trustees of [address] [place], [place] [id number], under license to [employer]. All rights reserved     Score:  Initial: 18 Most Recent: 21 (Date: 2019 )    Interpretation of Tool:  Represents activities that are increasingly more difficult (i.e. Bed mobility, Transfers, Gait). ·    Use of outcome tool(s) and clinical judgement create a POC that gives a: MODERATE COMPLEXITY            TREATMENT:   (In addition to Assessment/Re-Assessment sessions the following treatments were rendered)     Pre-treatment Symptoms/Complaints:    Pain: Initial:   Pain Intensity 1: 4 2 Post Session:  2     Self Care: (40): Procedure(s) (per grid) utilized to improve and/or restore self-care/home management as related to dressing, bathing, toileting and grooming. Required minimal verbal and tactile cueing to facilitate activities of daily living skills. Treatment/Session Assessment:     Response to Treatment:  Good, sitting up in recliner. Education:  ? Home Exercises  ? Fall Precautions  ? Hip Precautions ? Going Home Video  ? Knee/Hip Prosthesis Review  ? Walker Management/Safety ?  Adaptive Equipment as Needed       Interdisciplinary Collaboration:   o Physical Therapist  o Occupational Therapist  o Registered Nurse    After treatment position/precautions:   o Up in chair  o Bed/Chair-wheels locked  o Caregiver at bedside  o Call light within reach  o RN notified     Compliance with Program/Exercises: Compliant all of the time, Will assess as treatment progresses. Recommendations/Intent for next treatment session:  D/c OT for acute deficits.       Total Treatment Duration:  OT Patient Time In/Time Out  Time In: 1250  Time Out: [address] [facility],

## 2019-08-28 NOTE — PROGRESS NOTES
2019         Post Op day: 1 Day Post-Op   Admit Diagnosis: Primary osteoarthritis of right knee [M17.11]  Arthritis of right knee [M17.11]  LAB:    Recent Results (from the past 24 hour(s))   HEMOGLOBIN    Collection Time: 19  7:40 PM   Result Value Ref Range    HGB 11.4 (L) 13.6 - 17.2 g/dL   HEMOGLOBIN    Collection Time: 19  4:46 AM   Result Value Ref Range    HGB 11.1 (L) 13.6 - 17.2 g/dL     Vital Signs:    Patient Vitals for the past 8 hrs:   BP Temp Pulse Resp SpO2   19 0808 132/71 97.9 °F (36.6 °C) (!) 107 16 98 %   19 0338 108/61 97.9 °F (36.6 °C) (!) 109 16 94 %     Temp (24hrs), Av.7 °F (36.5 °C), Min:97.1 °F (36.2 °C), Max:97.9 °F (36.6 °C)    Pain Control:   Pain Assessment  Pain Scale 1: Numeric (0 - 10)  Pain Intensity 1: 5  Pain Onset 1: years  Pain Location 1: Knee  Pain Orientation 1: Left  Pain Description 1: Dull  Subjective: Doing well, pain is well controlled, no complaints     Objective:  No Acute Distress, Alert and Oriented, Neurovascular exam is normal       Assessment:   Patient Active Problem List   Diagnosis Code    Vitamin D deficiency E55.9    Testosterone deficiency E34.9    Sleep apnea G47.30    Arthritis M19.90    Arrhythmia I49.9    Chronic kidney disease N18.9    GERD (gastroesophageal reflux disease) K21.9    Elevated cholesterol E78.00    Hypertension I10    Calculus of kidney N20.0    Morbid obesity ([address] E66.01    Type 2 diabetes mellitus with hyperglycemia, without long-term current use of insulin (HCC) E11.65    Snoring R06.83    Arthritis of right knee M17.11       Status Post Procedure(s) (LRB):  RIGHT KNEE ARTHROPLASTY TOTAL (Right)        Plan: Continue Physical Therapy, Monitor Hgb. ASA/SCDs for DVT prophylaxis. D/c to home today.   Patient seen by Dr. Scott Kam this AM.    Signed By: JARON Byers

## 2019-08-28 NOTE — PROGRESS NOTES
Pt discharge summary and home medication sheet reviewed with pt. Copy given for take home use. RX for po oxycodone and asa given to pt. All goals met. Assessment unchanged. Pt leaving hospital via w/c with wife and staff member.

## 2019-08-28 NOTE — PROGRESS NOTES
Problem: Mobility Impaired (Adult and Pediatric)  Goal: *Acute Goals and Plan of Care (Insert Text)  Description  GOALS (1-4 days):  (1.)Mr. Kal Buck will move from supine to sit and sit to supine  in bed with SUPERVISION. (2.)Mr. Kal Buck will transfer from bed to chair and chair to bed with SUPERVISION using the least restrictive device. (3.)Mr. Kal Buck will ambulate with SUPERVISION for 200 feet with the least restrictive device. Met 8/28/19  (4.)Mr. Kal Buck will ambulate up/down 3 steps with bilateral  railing with STAND BY ASSIST with no device. Met 8/28/19  (5.)Mr. Kal Buck will increase right knee ROM to 5°-80°. Met 8/28/19  ________________________________________________________________________________________________   Outcome: Progressing Towards Goal     PHYSICAL THERAPY JOINT CAMP TKA: Daily Note and PM 8/28/2019  INPATIENT: Hospital Day: 2  Payor: [facility] / Plan: [address] / Product Type: PPO /      NAME/AGE/GENDER: Candice Stacy is a 53 y.o. male   PRIMARY DIAGNOSIS:  Primary osteoarthritis of right knee [M17.11]   Procedure(s) and Anesthesia Type:     * RIGHT KNEE ARTHROPLASTY TOTAL - Spinal (Right)  ICD-10: Treatment Diagnosis:    · Pain in Right Knee (M25.561)  · Stiffness of Right Knee, Not elsewhere classified (M25.661)  · Difficulty in walking, Not elsewhere classified (R26.2)      ASSESSMENT:     Mr. Kal Buck presents with limited ROM and strength following his R TKA. He participated well and will benefit from PT to increase his functional independence with gait and transfers. He plans on returning home with HHPT and support of his wife. He ambulated in the halls then returned to chair to perform therex. 8/28/19:  Patient participated well. Good demonstration of exercises. Patient wanting to walk without the walker but explained he needed the walker for balance as well as developing a normal gait pattern. PM:  Patient participated well.   Ambulated increased distance and negotiated steps well. Great knee ROM. This section established at most recent assessment   PROBLEM LIST (Impairments causing functional limitations):  1. Decreased Strength  2. Decreased Transfer Abilities  3. Decreased Ambulation Ability/Technique  4. Decreased Balance  5. Increased Pain  6. Decreased Flexibility/Joint Mobility   INTERVENTIONS PLANNED: (Benefits and precautions of physical therapy have been discussed with the patient.)  1. Cold  2. bed mobility  3. gait training  4. home exercise program (HEP)  5. Range of Motion: active/assisted/passive  6. Therapeutic Activities  7. therapeutic exercise/strengthening  8. transfer training  9. Group Therapy     TREATMENT PLAN: Frequency/Duration: Follow patient BID for duration of hospital stay to address above goals. Rehabilitation Potential For Stated Goals: Excellent     RECOMMENDED REHABILITATION/EQUIPMENT: (at time of discharge pending progress): Continue Skilled Therapy and Home Health: Physical Therapy. HISTORY:   History of Present Injury/Illness (Reason for Referral): Admitted for R TKA. Needs his L knee replaced as well. Past Medical History/Comorbidities:   Mr. Salbador Mohan  has a past medical history of Arrhythmia (2009-- per pt caused by dilaudid), Arthritis, Diabetes mellitus, type 2 ([address] Elevated cholesterol, GERD (gastroesophageal reflux disease), History of kidney stones (2002), Hypertension, Morbid obesity ([address] PUD (peptic ulcer disease) (2003), Sleep apnea, Testosterone deficiency, and Vitamin D deficiency (06/11/2014). Mr. Salbador Mohan  has a past surgical history that includes hx colonoscopy (07/17/2015); pr neurological procedure unlisted (3/2011 at [facility]); hx orthopaedic; hx heent (2009); and hx gi (early 1990s).   Social History/Living Environment:   Home Environment: Private residence  One/Two Story Residence: One story  Living Alone: No  Support Systems: Family member(s)  Patient Expects to be Discharged to[de-identified] Unknown  Current DME Used/Available at Home: None  Prior Level of Function/Work/Activity:  Independent prior to admit. Number of Personal Factors/Comorbidities that affect the Plan of Care: 0: LOW COMPLEXITY   EXAMINATION:   Most Recent Physical Functioning:      Gross Assessment  AROM: Within functional limits(L LE)  Strength: Within functional limits(L LE)        RLE AROM  R Knee Flexion: 108  R Knee Extension: 0       RLE Strength  R Hip Flexion: 2+  R Knee Flexion: 2+  R Knee Extension: 2+    Bed Mobility  Supine to Sit: Stand-by assistance  Scooting: Stand-by assistance    Transfers  Sit to Stand: Supervision  Stand to Sit: Supervision  Bed to Chair: Supervision    Balance  Sitting: Intact  Standing: With support              Weight Bearing Status  Right Side Weight Bearing: As tolerated  Distance (ft): 288 Feet (ft)(x 2)  Ambulation - Level of Assistance: Supervision  Assistive Device: Walker, rolling  Base of Support: Center of gravity altered  Speed/Lluvia: Pace decreased (<100 feet/min)  Step Length: Left shortened;Right shortened  Stance: Right decreased  Gait Abnormalities: Antalgic  Number of Stairs Trained: 5  Stairs - Level of Assistance: Stand-by assistance  Rail Use: Both  Interventions: Safety awareness training;Verbal cues     Braces/Orthotics: none    Right Knee Cold  Type: Cryocuff      Body Structures Involved:  1. Joints  2. Muscles Body Functions Affected:  1. Movement Related Activities and Participation Affected:  1. Mobility   Number of elements that affect the Plan of Care: 4+: HIGH COMPLEXITY   CLINICAL PRESENTATION:   Presentation: Stable and uncomplicated: LOW COMPLEXITY   CLINICAL DECISION MAKIN [facility] [address] [id number] AM-PAC 6 Clicks   Basic Mobility Inpatient Short Form  How much difficulty does the patient currently have. .. Unable A Lot A Little None   1. Turning over in bed (including adjusting bedclothes, sheets and blankets)? ? 1   ? 2   ? 3   ? 4   2.   Sitting down on and standing up from a chair with arms ( e.g., wheelchair, bedside commode, etc.)   ? 1   ? 2   ? 3   ? 4   3. Moving from lying on back to sitting on the side of the bed?   ? 1   ? 2   ? 3   ? 4   How much help from another person does the patient currently need. .. Total A Lot A Little None   4. Moving to and from a bed to a chair (including a wheelchair)? ? 1   ? 2   ? 3   ? 4   5. Need to walk in hospital room? ? 1   ? 2   ? 3   ? 4   6. Climbing 3-5 steps with a railing? ? 1   ? 2   ? 3   ? 4   © 2007, Trustees of [address] [place] [address] [address] [id number], under license to [employer]. All rights reserved     Score:  Initial: 18 Most Recent: X (Date: -- )    Interpretation of Tool:  Represents activities that are increasingly more difficult (i.e. Bed mobility, Transfers, Gait). Medical Necessity:     · Patient is expected to demonstrate progress in strength, range of motion and balance  ·  to increase independence with gait and transfers   · . Reason for Services/Other Comments:  · Patient continues to require skilled intervention due to limited functional independence   · . Use of outcome tool(s) and clinical judgement create a POC that gives a: Clear prediction of patient's progress: LOW COMPLEXITY            TREATMENT:   (In addition to Assessment/Re-Assessment sessions the following treatments were rendered)     Pre-treatment Symptoms/Complaints:  Patient ready for group session. Pain: Initial:   Pain Intensity 1: 6  Pain Location 1: Knee  Pain Orientation 1: Right  Pain Intervention(s) 1: Ambulation/Increased Activity, Exercise, Nurse notified  Post Session:  5/10       Gait Training (15 Minutes):  Gait training to improve and/or restore physical functioning as related to mobility, balance and coordination. Ambulated 288 Feet (ft)(x 2) with Supervision using a Walker, rolling and minimal Safety awareness training;Verbal cues related to their stance phase and stride length to promote proper body posture. Therapeutic Exercise: (45 Minutes(group)):  Exercises per grid below to improve mobility and strength. Required minimal verbal cues to promote proper body alignment. Date:  8/27 Date:  8/28/19 Date:     ACTIVITY/EXERCISE AM PM AM PM AM PM   GROUP THERAPY  ? ?  ?  x  ?  ? Ankle Pumps  10 15 15     Quad Sets  10 15 15     Gluteal Sets  10 15 15     Hip ABd/ADduction  10 15 15     Straight Leg Raises  10 15 15     Knee Slides  10 15 15     Short Arc Quads   15 15     Long Arc Quads         Chair Slides    15              B = bilateral; AA = active assistive; A = active; P = passive      Treatment/Session Assessment:     Response to Treatment:  Patient participated well. Great knee ROM and negotiated steps well. Education:  ? Home Exercises  ? Fall Precautions  ? Hip Precautions x D/C Instruction Review  x Knee/Hip Prosthesis Review  ? Walker Management/Safety ? Adaptive Equipment as Needed       Interdisciplinary Collaboration:   o Physical Therapist  o Registered Nurse  o Rehabilitation Attendant    After treatment position/precautions:   o Up in chair  o Bed/Chair-wheels locked  o Bed in low position  o Caregiver at bedside  o Call light within reach  o RN notified    Compliance with Program/Exercises: Compliant all of the time. Recommendations/Intent for next treatment session:  Treatment next visit will focus on increasing Mr. Bourgeois's independence with bed mobility, transfers, gait training, strength/ROM exercises, modalities for pain, and patient education.       Total Treatment Duration:  PT Patient Time In/Time Out  Time In: 1300  Time Out: [facility] YESSENIA Lin

## 2019-08-28 NOTE — PROGRESS NOTES
08/27/19 2116   Oxygen Therapy   O2 Sat (%) 96 %   Pulse via Oximetry 98 beats per minute   O2 Device Nasal cannula   O2 Flow Rate (L/min) 3 l/min   Patient placed on continuous sat monitor (#10). Alarms set and data cleared. Placed on 3L NC per orders due to sleep apnea. No distress noted at this time.

## 2019-08-28 NOTE — DISCHARGE INSTRUCTIONS
[address] [facility]   Patient Discharge Instructions    Reji Erickson / [id number] : 1965    Admitted 2019 Discharged: 2019     IF YOU HAVE ANY PROBLEMS ONCE YOU ARE AT HOME CALL THE FOLLOWING NUMBERS:   Main office number: [phone]    Take Home Medications       · It is important that you take the medication exactly as they are prescribed. · Keep your medication in the bottles provided by the pharmacist and keep a list of the medication names, dosages, and times to be taken in your wallet. · Do not take other medications without consulting your doctor. What to do at [address] your prehospital diet. If you have excessive nausea or vomitting call your doctor's office     Home Physical Therapy is arranged. Use rolling walker when walking. Patients who have had a joint replacement should not drive until you are seen for your follow up appointment by Dr. Sol Peterson. When to Call    - Call if you have a temperature greater then 101  - Unable to keep food down  - Loose control of your bladder or bowel function  - Are unable to bear any weight   - Need a pain medication refill       DISCHARGE SUMMARY from Nurse    The following personal items collected during your admission are returned to you:   Dental Appliance: Dental Appliances: None  Vision: Visual Aid: Glasses  Hearing Aid:    Jewelry: Jewelry: None  Clothing: Clothing: At bedside  Other Valuables: Other Valuables: Wallet, Cell Phone(wife to keep )  Valuables sent to safe:      PATIENT INSTRUCTIONS:    After general anesthesia or intravenous sedation, for 24 hours or while taking prescription Narcotics:  · Limit your activities  · Do not drive and operate hazardous machinery  · Do not make important personal or business decisions  · Do  not drink alcoholic beverages  · If you have not urinated within 8 hours after discharge, please contact your surgeon on call.     Report the following to your surgeon:  · Excessive pain, swelling, redness or odor of or around the surgical area  · Temperature over 101  · Nausea and vomiting lasting longer than 4 hours or if unable to take medications  · Any signs of decreased circulation or nerve impairment to extremity: change in color, persistent  numbness, tingling, coldness or increase pain  · Any questions, call office @ [phone]      Keep scheduled follow up appointment. If need to change, call office @ [phone]. *  Please give a list of your current medications to your Primary Care Provider. *  Please update this list whenever your medications are discontinued, doses are      changed, or new medications (including over-the-counter products) are added. *  Please carry medication information at all times in case of emergency situations. Patient Education        Total Knee Replacement: What to Expect at Home  Your Recovery    When you leave the hospital, you should be able to move around with a walker or crutches. But you will need someone to help you at home for the next few weeks or until you have more energy and can move around better. If you need more extensive rehab, you may go to a specialized rehab center for more treatment. You will go home with a bandage and stitches, staples, tissue glue, or tape strips. Change the bandage as your doctor tells you to. If you have stitches or staples, your doctor will remove them 10 to 21 days after your surgery. Glue or tape strips will fall off on their own over time. You may still have some mild pain, and the area may be swollen for 3 to 6 months after surgery. Your knee will continue to improve for 6 to 12 months. You will probably use a walker for 1 to 3 weeks and then use crutches. When you are ready, you can use a cane. You will probably be able to walk on your own in 4 to 8 weeks. You will need to do months of physical rehabilitation (rehab) after a knee replacement.  Rehab will help you strengthen the muscles of the knee and help you regain movement. After you recover, your artificial knee will allow you to do normal daily activities with less pain or no pain at all. You may be able to hike, dance, ride a bike, and play golf. Talk to your doctor about whether you can do more strenuous activities. Always tell your caregivers that you have an artificial knee. How long it will take to walk on your own, return to normal activities, and go back to work depends on your health and how well your rehabilitation (rehab) program goes. The better you do with your rehab exercises, the quicker you will get your strength and movement back. This care sheet gives you a general idea about how long it will take for you to recover. But each person recovers at a different pace. Follow the steps below to get better as quickly as possible. How can you care for yourself at home? Activity    · Rest when you feel tired. You may take a nap, but do not stay in bed all day. When you sit, use a chair with arms. You can use the arms to help you stand up.     · Work with your physical therapist to find the best way to exercise. What you can do as your knee heals will depend on whether your new knee is cemented or uncemented. You may not be able to do certain things for a while if your new knee is uncemented.     · After your knee has healed enough, you can do more strenuous activities with caution. ? You can golf, but use a golf cart, and do not wear shoes with spikes. ? You can bike on a flat road or on a stationary bike. Avoid biking up hills. ? Your doctor may suggest that you stay away from activities that put stress on your knee. These include tennis or badminton, squash or racquetball, contact sports like football, jumping (such as in basketball), jogging, or running. ? Avoid activities where you might fall. These include horseback riding, skiing, and mountain biking.     · Do not sit for more than 1 hour at a time.  Get up and walk around for a while before you sit again. If you must sit for a long time, prop up your leg with a chair or footstool. This will help you avoid swelling.     · Ask your doctor when you can drive again. It may take up to 8 weeks after knee replacement surgery before it is safe for you to drive.     · When you get into a car, sit on the edge of the seat. Then pull in your legs, and turn to face the front.     · You should be able to do many everyday activities 3 to 6 weeks after your surgery. You will probably need to take 4 to 16 weeks off from work. When you can go back to work depends on the type of work you do and how you feel.     · Ask your doctor when it is okay for you to have sex.     · Do not lift anything heavier than 10 pounds and do not lift weights for 12 weeks. Diet    · By the time you leave the hospital, you should be eating your normal diet. If your stomach is upset, try bland, low-fat foods like plain rice, broiled chicken, toast, and yogurt. Your doctor may suggest that you take iron and vitamin supplements.     · Drink plenty of fluids (unless your doctor tells you not to).   · Eat healthy foods, and watch your portion sizes. Try to stay at your ideal weight. Too much weight puts more stress on your new knee.     · You may notice that your bowel movements are not regular right after your surgery. This is common. Try to avoid constipation and straining with bowel movements. You may want to take a fiber supplement every day. If you have not had a bowel movement after a couple of days, ask your doctor about taking a mild laxative. Medicines    · Your doctor will tell you if and when you can restart your medicines. He or she will also give you instructions about taking any new medicines.     · If you take blood thinners, such as warfarin (Coumadin), clopidogrel (Plavix), or aspirin, be sure to talk to your doctor. He or she will tell you if and when to start taking those medicines again.  Make sure that you understand exactly what your doctor wants you to do.     · Your doctor may give you a blood-thinning medicine to prevent blood clots. If you take a blood thinner, be sure you get instructions about how to take your medicine safely. Blood thinners can cause serious bleeding problems. This medicine could be in pill form or as a shot (injection). If a shot is necessary, your doctor will tell you how to do this.     · Be safe with medicines. Take pain medicines exactly as directed. ? If the doctor gave you a prescription medicine for pain, take it as prescribed. ? If you are not taking a prescription pain medicine, ask your doctor if you can take an over-the-counter medicine. ? Plan to take your pain medicine 30 minutes before exercises. It is easier to prevent pain before it starts than to stop it once it has started.     · If you think your pain medicine is making you sick to your stomach:  ? Take your medicine after meals (unless your doctor has told you not to). ? Ask your doctor for a different pain medicine.     · If your doctor prescribed antibiotics, take them as directed. Do not stop taking them just because you feel better. You need to take the full course of antibiotics. Incision care    · If your doctor told you how to care for your cut (incision), follow your doctor's instructions. You will have a dressing over the cut. A dressing helps the incision heal and protects it. Your doctor will tell you how to take care of this.     · If you did not get instructions, follow this general advice:  ? If you have strips of tape on the cut the doctor made, leave the tape on for a week or until it falls off.  ? If you have stitches or staples, your doctor will tell you when to come back to have them removed. ? If you have skin adhesive on the cut, leave it on until it falls off. Skin adhesive is also called glue or liquid stitches. ? Change the bandage every day. ?  Wash the area daily with warm water, and pat it dry. Don't use hydrogen peroxide or alcohol. They can slow healing. ? You may cover the area with a gauze bandage if it oozes fluid or rubs against clothing. ? You may shower 24 to 48 hours after surgery. Pat the incision dry. Don't swim or take a bath for the first 2 weeks, or until your doctor tells you it is okay. Exercise    · Your rehab program will give you a number of exercises to do to help you get back your knee's range of motion and strength. Always do them as your therapist tells you. Ice and elevation    · For pain and swelling, put ice or a cold pack on the area for 10 to 20 minutes at a time. Put a thin cloth between the ice and your skin. Other instructions    · Continue to wear your support stockings as your doctor says. These help to prevent blood clots. The length of time that you will have to wear them depends on your activity level and the amount of swelling.     · You have metal pieces in your knee. These may set off some airport metal detectors. Carry a medical alert card that says you have an artificial joint, just in case. Follow-up care is a key part of your treatment and safety. Be sure to make and go to all appointments, and call your doctor if you are having problems. It's also a good idea to know your test results and keep a list of the medicines you take. When should you call for help? Call 911 anytime you think you may need emergency care. For example, call if:    · You passed out (lost consciousness).     · You have severe trouble breathing.     · You have sudden chest pain and shortness of breath, or you cough up blood.    Call your doctor now or seek immediate medical care if:    · You have signs of infection, such as:  ? Increased pain, swelling, warmth, or redness. ? Red streaks leading from the incision. ? Pus draining from the incision. ? A fever.     · You have signs of a blood clot, such as:  ? Pain in your calf, back of the knee, thigh, or groin. ?  Redness and swelling in your leg or groin.     · Your incision comes open and begins to bleed, or the bleeding increases.     · You have pain that does not get better after you take pain medicine.    Watch closely for changes in your health, and be sure to contact your doctor if:    · You do not have a bowel movement after taking a laxative. Where can you learn more? Go to http://eder-isabelle.info/. Enter T597 in the search box to learn more about \"Total Knee Replacement: What to Expect at Home. \"  Current as of: September 20, 2018  Content Version: 12.1  © 7749-1044 [employer]. Care instructions adapted under license by [employer] (which disclaims liability or warranty for this information). If you have questions about a medical condition or this instruction, always ask your healthcare professional. [address] any warranty or liability for your use of this information. These are general instructions for a healthy lifestyle:    No smoking/ No tobacco products/ Avoid exposure to second hand smoke    Surgeon General's Warning:  Quitting smoking now greatly reduces serious risk to your health. Obesity, smoking, and sedentary lifestyle greatly increases your risk for illness    A healthy diet, regular physical exercise & weight monitoring are important for maintaining a healthy lifestyle    You may be retaining fluid if you have a history of heart failure or if you experience any of the following symptoms:  Weight gain of 3 pounds or more overnight or 5 pounds in a week, increased swelling in our hands or feet or shortness of breath while lying flat in bed. Please call your doctor as soon as you notice any of these symptoms; do not wait until your next office visit.     Recognize signs and symptoms of STROKE:    F-face looks uneven    A-arms unable to move or move even    S-speech slurred or non-existent    T-time-call 911 as soon as signs and symptoms begin-DO NOT go       Back to bed or wait to see if you get better-TIME IS BRAIN. The discharge information has been reviewed with the patient. The patient verbalized understanding.  '    Information obtained by :  I understand that if any problems occur once I am at home I am to contact my physician. I understand and acknowledge receipt of the instructions indicated above.                                                                                                                                            Physician's or R.N.'s Signature                                                                  Date/Time                                                                                                                                              Patient or Representative Signature                                                          Date/Time

## 2019-08-28 NOTE — PROGRESS NOTES
Problem: Self Care Deficits Care Plan (Adult)  Goal: *Acute Goals and Plan of Care (Insert Text)  Description  GOALS:   DISCHARGE GOALS (in preparation for going home/rehab):  3 days  1. Mr. Andrew Huber will perform one lower body dressing activity with minimal assistance required to demonstrate improved functional mobility and safety. -GOAL MET 8/28/2019   2. Mr. Andrew Huber will perform one lower body bathing activity with minimal assistance required to demonstrate improved functional mobility and safety. -GOAL MET 8/28/2019   3. Mr. Andrew Huber will perform toileting/toilet transfer with contact guard assistance to demonstrate improved functional mobility and safety. -GOAL MET 8/28/2019   4. Mr. Andrew Huber will perform shower transfer with contact guard assistance to demonstrate improved functional mobility and safety. -GOAL MET 8/28/2019   5. Addendum goal, patient has a tub/shower and interested in transfer, goal set to supervision. JOINT CAMP OCCUPATIONAL THERAPY TKA: Daily Note 8/28/2019  INPATIENT: Hospital Day: 2  Payor: [address] [place] [address] / Plan: [address] [place] [address] / Product Type: PPO /      NAME/AGE/GENDER: Hernando Urbina is a 53 y.o. male   PRIMARY DIAGNOSIS:  Primary osteoarthritis of right knee [M17.11]   Procedure(s) and Anesthesia Type:     * RIGHT KNEE ARTHROPLASTY TOTAL - Spinal (Right)  ICD-10: Treatment Diagnosis:    · Pain in Right Knee (M25.561)  · Stiffness of Right Knee, Not elsewhere classified (G48.727)      ASSESSMENT:      Mr. Andrew Huber is s/p Right TKA and presents with decreased weight bearing on R LE and decreased independence with functional mobility and activities of daily living. Patient completed shower and dressing as charter below in ADL grid and is ambulating with rolling walker and stand by assist.  Patient has met 4/4 goals and plans to return home with good family support. Family able to provide patient with appropriate level of assistance at this time.    Will do well at home for self cares and transfers during ADL's. Will see for tub/shower transfer training than d/c. This section established at most recent assessment   PROBLEM LIST (Impairments causing functional limitations):  1. Decreased Strength  2. Decreased ADL/Functional Activities  3. Decreased Transfer Abilities  4. Increased Pain  5. Increased Fatigue  6. Decreased Flexibility/Joint Mobility  7. Decreased Knowledge of Precautions   INTERVENTIONS PLANNED: (Benefits and precautions of occupational therapy have been discussed with the patient.)  1. Activities of daily living training  2. Adaptive equipment training  3. Balance training  4. Clothing management  5. Donning&doffing training  6. Theraputic activity     TREATMENT PLAN: Frequency/Duration: Follow patient 1-2tx to address above goals. Rehabilitation Potential For Stated Goals: Excellent     RECOMMENDED REHABILITATION/EQUIPMENT: (at time of discharge pending progress): Continue Skilled Therapy. OCCUPATIONAL PROFILE AND HISTORY:   History of Present Injury/Illness (Reason for Referral): Pt presents this date s/p (Right) TKA. Past Medical History/Comorbidities:   Mr. Neeta Parada  has a past medical history of Arrhythmia (2009-- per pt caused by dilaudid), Arthritis, Diabetes mellitus, type 2 ([address] Elevated cholesterol, GERD (gastroesophageal reflux disease), History of kidney stones (2002), Hypertension, Morbid obesity ([address] PUD (peptic ulcer disease) (2003), Sleep apnea, Testosterone deficiency, and Vitamin D deficiency (06/11/2014). Mr. Neeta Parada  has a past surgical history that includes hx colonoscopy (07/17/2015); pr neurological procedure unlisted (3/2011 at [facility]); hx orthopaedic; hx heent (2009); and hx gi (early 1990s).   Social History/Living Environment:   Home Environment: Private residence  One/Two Story Residence: One story  Living Alone: No  Support Systems: Family member(s)  Patient Expects to be Discharged to[de-identified] Unknown  Current DME Used/Available at Home: None  Prior Level of Function/Work/Activity:  Independent prior. Number of Personal Factors/Comorbidities that affect the Plan of Care: Brief history (0):  LOW COMPLEXITY   ASSESSMENT OF OCCUPATIONAL PERFORMANCE[de-identified]   Most Recent Physical Functioning:   Balance  Sitting: Intact  Standing: With support       Gross Assessment  AROM: Within functional limits(L LE)  Strength: Within functional limits(L LE)            Coordination  Fine Motor Skills-Upper: Left Intact; Right Intact  Gross Motor Skills-Upper: Left Intact; Right Intact         Mental Status  Neurologic State: Alert  Orientation Level: Oriented X4  Cognition: Appropriate decision making; Appropriate for age attention/concentration; Appropriate safety awareness  Perception: Appears intact          RLE Strength  R Hip Flexion: 2+  R Knee Flexion: 2+  R Knee Extension: 2+     Basic ADLs (From Assessment) Complex ADLs (From Assessment)   Basic ADL  Feeding: Independent  Oral Facial Hygiene/Grooming: Setup  Bathing: Supervision  Type of Bath: Chlorhexidine (CHG), Full, Shower  Upper Body Dressing: Setup  Lower Body Dressing: Stand-by assistance  Toileting: Stand by assistance     Grooming/Bathing/Dressing Activities of Daily Living   Grooming  Grooming Assistance: Independent     Upper Body Bathing  Bathing Assistance: Set-up     Lower Body Bathing  Bathing Assistance: Stand-by assistance     Upper Body Dressing Assistance  Dressing Assistance: Set-up Functional Transfers  Bathroom Mobility: Supervision/set up  Toilet Transfer : Supervision  Shower Transfer: Supervision   Lower Body Dressing Assistance  Dressing Assistance: Stand-by assistance  Underpants: Stand-by assistance  Pants With Elastic Waist: Stand-by assistance  Socks: Stand-by assistance Bed/Mat Mobility  Supine to Sit: Stand-by assistance  Sit to Stand: Stand-by assistance  Stand to Sit: Stand-by assistance  Bed to Chair: Supervision  Scooting: Stand-by assistance Physical Skills Involved:  1. Range of Motion  2. Balance  3. Strength Cognitive Skills Affected (resulting in the inability to perform in a timely and safe manner):  1. [facility]  Psychosocial Skills Affected:  1. WFL    Number of elements that affect the Plan of Care: 1-3:  LOW COMPLEXITY   CLINICAL DECISION MAKIN[address] [place] [address] AM-PAC 6 Clicks   Daily Activity Inpatient Short Form  How much help from another person does the patient currently need. .. Total A Lot A Little None   1. Putting on and taking off regular lower body clothing? ? 1    2   x? 3   ? 4   2. Bathing (including washing, rinsing, drying)? ? 1    2   x? 3   ? 4   3. Toileting, which includes using toilet, bedpan or urinal?   ? 1    2   x? 3   ? 4   4. Putting on and taking off regular upper body clothing? ? 1   ? 2   ? 3   ? 4   5. Taking care of personal grooming such as brushing teeth? ? 1   ? 2   ? 3   ? 4   6. Eating meals? ? 1   ? 2   ? 3   ? 4   © , Trustees of [address] [place] [address], under license to [employer]. All rights reserved     Score:  Initial: 18 Most Recent: 21 (Date: 2019 )    Interpretation of Tool:  Represents activities that are increasingly more difficult (i.e. Bed mobility, Transfers, Gait). ·    Use of outcome tool(s) and clinical judgement create a POC that gives a: MODERATE COMPLEXITY            TREATMENT:   (In addition to Assessment/Re-Assessment sessions the following treatments were rendered)     Pre-treatment Symptoms/Complaints:    Pain: Initial:   Pain Intensity 1: 5 2 Post Session:  2     Self Care: (40): Procedure(s) (per grid) utilized to improve and/or restore self-care/home management as related to dressing, bathing, toileting and grooming. Required minimal verbal and tactile cueing to facilitate activities of daily living skills. Treatment/Session Assessment:     Response to Treatment:  Good, sitting up in recliner. Education:  ? Home Exercises  ? Fall Precautions  ? Hip Precautions ? Going Home Video  ? Knee/Hip Prosthesis Review  ? Walker Management/Safety ? Adaptive Equipment as Needed       Interdisciplinary Collaboration:   o Physical Therapist  o Occupational Therapist  o Registered Nurse    After treatment position/precautions:   o Up in chair  o Bed/Chair-wheels locked  o Caregiver at bedside  o Call light within reach  o RN notified     Compliance with Program/Exercises: Compliant all of the time, Will assess as treatment progresses. Recommendations/Intent for next treatment session:  D/c OT for acute deficits.       Total Treatment Duration:  OT Patient Time In/Time Out  Time In: 0294  Time Out: [address], OT

## 2019-08-28 NOTE — PROGRESS NOTES
Problem: Mobility Impaired (Adult and Pediatric)  Goal: *Acute Goals and Plan of Care (Insert Text)  Description  GOALS (1-4 days):  (1.)Mr. Andrew Huber will move from supine to sit and sit to supine  in bed with SUPERVISION. (2.)Mr. Andrew Huber will transfer from bed to chair and chair to bed with SUPERVISION using the least restrictive device. (3.)Mr. Andrew Huber will ambulate with SUPERVISION for 200 feet with the least restrictive device. (4.)Mr. Andrew Huber will ambulate up/down 3 steps with bilateral  railing with STAND BY ASSIST with no device. (5.)Mr. Andrew Huber will increase right knee ROM to 5°-80°.  ________________________________________________________________________________________________   Outcome: Progressing Towards Goal     PHYSICAL THERAPY JOINT CAMP TKA: Daily Note and AM 8/28/2019  INPATIENT: Hospital Day: 2  Payor: [address] [place] [address] / Plan: [address] [place] [address] Avenue / Product Type: PPO /      NAME/AGE/GENDER: Hernando Urbina is a 53 y.o. male   PRIMARY DIAGNOSIS:  Primary osteoarthritis of right knee [M17.11]   Procedure(s) and Anesthesia Type:     * RIGHT KNEE ARTHROPLASTY TOTAL - Spinal (Right)  ICD-10: Treatment Diagnosis:    · Pain in Right Knee (M25.561)  · Stiffness of Right Knee, Not elsewhere classified (M25.661)  · Difficulty in walking, Not elsewhere classified (R26.2)      ASSESSMENT:     Mr. Andrew Huber presents with limited ROM and strength following his R TKA. He participated well and will benefit from PT to increase his functional independence with gait and transfers. He plans on returning home with HHPT and support of his wife. He ambulated in the halls then returned to chair to perform therex. 8/28/19:  Patient participated well. Good demonstration of exercises. Patient wanting to walk without the walker but explained he needed the walker for balance as well as developing a normal gait pattern.        This section established at most recent assessment   PROBLEM LIST (Impairments causing functional limitations):  1. Decreased Strength  2. Decreased Transfer Abilities  3. Decreased Ambulation Ability/Technique  4. Decreased Balance  5. Increased Pain  6. Decreased Flexibility/Joint Mobility   INTERVENTIONS PLANNED: (Benefits and precautions of physical therapy have been discussed with the patient.)  1. Cold  2. bed mobility  3. gait training  4. home exercise program (HEP)  5. Range of Motion: active/assisted/passive  6. Therapeutic Activities  7. therapeutic exercise/strengthening  8. transfer training  9. Group Therapy     TREATMENT PLAN: Frequency/Duration: Follow patient BID for duration of hospital stay to address above goals. Rehabilitation Potential For Stated Goals: Excellent     RECOMMENDED REHABILITATION/EQUIPMENT: (at time of discharge pending progress): Continue Skilled Therapy and Home Health: Physical Therapy. HISTORY:   History of Present Injury/Illness (Reason for Referral): Admitted for R TKA. Needs his L knee replaced as well. Past Medical History/Comorbidities:   Mr. Vena Severs  has a past medical history of Arrhythmia (2009-- per pt caused by dilaudid), Arthritis, Diabetes mellitus, type 2 ([address] Elevated cholesterol, GERD (gastroesophageal reflux disease), History of kidney stones (2002), Hypertension, Morbid obesity ([address] PUD (peptic ulcer disease) (2003), Sleep apnea, Testosterone deficiency, and Vitamin D deficiency (06/11/2014). Mr. Vena Severs  has a past surgical history that includes hx colonoscopy (07/17/2015); pr neurological procedure unlisted (3/2011 at [facility]); hx orthopaedic; hx heent (2009); and hx gi (early 1990s). Social History/Living Environment:   Home Environment: Private residence  One/Two Story Residence: One story  Living Alone: No  Support Systems: Family member(s)  Patient Expects to be Discharged to[de-identified] Unknown  Current DME Used/Available at Home: None  Prior Level of Function/Work/Activity:  Independent prior to admit.     Number of Personal Factors/Comorbidities that affect the Plan of Care: 0: LOW COMPLEXITY   EXAMINATION:   Most Recent Physical Functioning:      Gross Assessment  AROM: Within functional limits(L LE)  Strength: Within functional limits(L LE)                RLE Strength  R Hip Flexion: 2+  R Knee Flexion: 2+  R Knee Extension: 2+    Bed Mobility  Supine to Sit: Stand-by assistance  Scooting: Stand-by assistance    Transfers  Sit to Stand: Stand-by assistance  Stand to Sit: Stand-by assistance  Bed to Chair: Supervision    Balance  Sitting: Intact  Standing: With support              Weight Bearing Status  Right Side Weight Bearing: As tolerated  Distance (ft): 420 Feet (ft)  Ambulation - Level of Assistance: Stand-by assistance  Assistive Device: Walker, rolling  Base of Support: Center of gravity altered  Speed/Lluvia: Pace decreased (<100 feet/min)  Step Length: Left shortened;Right shortened  Stance: Right decreased  Gait Abnormalities: Antalgic  Interventions: Safety awareness training;Verbal cues     Braces/Orthotics: none    Right Knee Cold  Type: Cryocuff      Body Structures Involved:  1. Joints  2. Muscles Body Functions Affected:  1. Movement Related Activities and Participation Affected:  1. Mobility   Number of elements that affect the Plan of Care: 4+: HIGH COMPLEXITY   CLINICAL PRESENTATION:   Presentation: Stable and uncomplicated: LOW COMPLEXITY   CLINICAL DECISION MAKIN [facility] [address] [id number] AM-PAC 6 Clicks   Basic Mobility Inpatient Short Form  How much difficulty does the patient currently have. .. Unable A Lot A Little None   1. Turning over in bed (including adjusting bedclothes, sheets and blankets)? ? 1   ? 2   ? 3   ? 4   2. Sitting down on and standing up from a chair with arms ( e.g., wheelchair, bedside commode, etc.)   ? 1   ? 2   ? 3   ? 4   3. Moving from lying on back to sitting on the side of the bed?   ? 1   ? 2   ? 3   ? 4   How much help from another person does the patient currently need. .. Total A Lot A Little None   4. Moving to and from a bed to a chair (including a wheelchair)? ? 1   ? 2   ? 3   ? 4   5. Need to walk in hospital room? ? 1   ? 2   ? 3   ? 4   6. Climbing 3-5 steps with a railing? ? 1   ? 2   ? 3   ? 4   © 2007, Trustees of [address] [place] [address] [address] [address], under license to [employer]. All rights reserved     Score:  Initial: 18 Most Recent: X (Date: -- )    Interpretation of Tool:  Represents activities that are increasingly more difficult (i.e. Bed mobility, Transfers, Gait). Medical Necessity:     · Patient is expected to demonstrate progress in strength, range of motion and balance  ·  to increase independence with gait and transfers   · . Reason for Services/Other Comments:  · Patient continues to require skilled intervention due to limited functional independence   · . Use of outcome tool(s) and clinical judgement create a POC that gives a: Clear prediction of patient's progress: LOW COMPLEXITY            TREATMENT:   (In addition to Assessment/Re-Assessment sessions the following treatments were rendered)     Pre-treatment Symptoms/Complaints:  Patient ready for therapy. Pain: Initial:   Pain Intensity 1: 5  Pain Location 1: Knee  Pain Orientation 1: Right  Pain Intervention(s) 1: Ambulation/Increased Activity, Cold pack, Exercise  Post Session:  5/10       Gait Training (15 Minutes):  Gait training to improve and/or restore physical functioning as related to mobility, balance and coordination. Ambulated 420 Feet (ft) with Stand-by assistance using a Walker, rolling and minimal Safety awareness training;Verbal cues related to their stance phase and stride length to promote proper body posture. Therapeutic Exercise: (10 Minutes):  Exercises per grid below to improve mobility and strength. Required minimal verbal cues to promote proper body alignment. Date:  8/27 Date:  8/28/19 Date:     ACTIVITY/EXERCISE AM PM AM PM AM PM   GROUP THERAPY  ?  ?  ?  ?  ?  ?    Ankle Pumps  10 15      Quad Sets  10 15      Gluteal Sets  10 15      Hip ABd/ADduction  10 15      Straight Leg Raises  10 15      Knee Slides  10 15      Short Arc Quads   15      Long Arc Quads         Chair Slides                  B = bilateral; AA = active assistive; A = active; P = passive      Treatment/Session Assessment:     Response to Treatment:  Patient participated well. Moving well but needs to continue using the walker. Education:  ? Home Exercises  ? Fall Precautions  ? Hip Precautions ? D/C Instruction Review  ? Knee/Hip Prosthesis Review  ? Walker Management/Safety ? Adaptive Equipment as Needed       Interdisciplinary Collaboration:   o Physical Therapist  o Registered Nurse    After treatment position/precautions:   o Up in chair  o Bed/Chair-wheels locked  o Bed in low position  o Caregiver at bedside  o Call light within reach    Compliance with Program/Exercises: Compliant all of the time. Recommendations/Intent for next treatment session:  Treatment next visit will focus on increasing Mr. Bourgeois's independence with bed mobility, transfers, gait training, strength/ROM exercises, modalities for pain, and patient education.       Total Treatment Duration:  PT Patient Time In/Time Out  Time In: 0925  Time Out: YESSENIA Mehta

## 2019-08-29 ENCOUNTER — HOME CARE VISIT (OUTPATIENT)
Dept: SCHEDULING | Facility: HOME HEALTH | Age: 54
End: 2019-08-29
Payer: COMMERCIAL

## 2019-08-29 VITALS
SYSTOLIC BLOOD PRESSURE: 144 MMHG | DIASTOLIC BLOOD PRESSURE: 82 MMHG | TEMPERATURE: 98.8 F | OXYGEN SATURATION: 97 % | RESPIRATION RATE: 18 BRPM | HEART RATE: 86 BPM

## 2019-08-29 PROCEDURE — G0151 HHCP-SERV OF PT,EA 15 MIN: HCPCS

## 2019-08-29 PROCEDURE — 400013 HH SOC

## 2019-09-02 ENCOUNTER — HOME CARE VISIT (OUTPATIENT)
Dept: SCHEDULING | Facility: HOME HEALTH | Age: 54
End: 2019-09-02
Payer: COMMERCIAL

## 2019-09-02 PROCEDURE — G0151 HHCP-SERV OF PT,EA 15 MIN: HCPCS

## 2019-09-04 ENCOUNTER — HOME CARE VISIT (OUTPATIENT)
Dept: SCHEDULING | Facility: HOME HEALTH | Age: 54
End: 2019-09-04
Payer: COMMERCIAL

## 2019-09-04 VITALS
TEMPERATURE: 97.6 F | DIASTOLIC BLOOD PRESSURE: 75 MMHG | HEART RATE: 90 BPM | RESPIRATION RATE: 17 BRPM | SYSTOLIC BLOOD PRESSURE: 122 MMHG

## 2019-09-04 PROCEDURE — G0151 HHCP-SERV OF PT,EA 15 MIN: HCPCS

## 2019-09-05 ENCOUNTER — HOME CARE VISIT (OUTPATIENT)
Dept: SCHEDULING | Facility: HOME HEALTH | Age: 54
End: 2019-09-05
Payer: COMMERCIAL

## 2019-09-05 VITALS
TEMPERATURE: 97.4 F | RESPIRATION RATE: 16 BRPM | SYSTOLIC BLOOD PRESSURE: 142 MMHG | HEART RATE: 99 BPM | DIASTOLIC BLOOD PRESSURE: 81 MMHG

## 2019-09-05 PROCEDURE — G0151 HHCP-SERV OF PT,EA 15 MIN: HCPCS

## 2019-09-09 ENCOUNTER — HOME CARE VISIT (OUTPATIENT)
Dept: SCHEDULING | Facility: HOME HEALTH | Age: 54
End: 2019-09-09
Payer: COMMERCIAL

## 2019-09-09 VITALS
SYSTOLIC BLOOD PRESSURE: 140 MMHG | DIASTOLIC BLOOD PRESSURE: 90 MMHG | HEART RATE: 100 BPM | RESPIRATION RATE: 16 BRPM | TEMPERATURE: 97.7 F

## 2019-09-09 PROCEDURE — A4649 SURGICAL SUPPLIES: HCPCS

## 2019-09-09 PROCEDURE — G0151 HHCP-SERV OF PT,EA 15 MIN: HCPCS

## 2019-09-13 ENCOUNTER — HOME CARE VISIT (OUTPATIENT)
Dept: SCHEDULING | Facility: HOME HEALTH | Age: 54
End: 2019-09-13
Payer: COMMERCIAL

## 2019-09-13 PROCEDURE — G0157 HHC PT ASSISTANT EA 15: HCPCS

## 2019-09-15 VITALS
TEMPERATURE: 98.1 F | DIASTOLIC BLOOD PRESSURE: 82 MMHG | SYSTOLIC BLOOD PRESSURE: 122 MMHG | RESPIRATION RATE: 19 BRPM | HEART RATE: 90 BPM

## 2019-09-17 ENCOUNTER — HOME CARE VISIT (OUTPATIENT)
Dept: SCHEDULING | Facility: HOME HEALTH | Age: 54
End: 2019-09-17
Payer: COMMERCIAL

## 2019-09-17 VITALS
DIASTOLIC BLOOD PRESSURE: 80 MMHG | TEMPERATURE: 97.3 F | HEART RATE: 98 BPM | RESPIRATION RATE: 16 BRPM | SYSTOLIC BLOOD PRESSURE: 135 MMHG

## 2019-09-17 PROCEDURE — G0151 HHCP-SERV OF PT,EA 15 MIN: HCPCS

## 2019-09-20 ENCOUNTER — HOSPITAL ENCOUNTER (OUTPATIENT)
Dept: PHYSICAL THERAPY | Age: 54
Discharge: HOME | End: 2019-09-20
Payer: COMMERCIAL

## 2019-09-20 PROCEDURE — 97110 THERAPEUTIC EXERCISES: CPT

## 2019-09-20 PROCEDURE — 97161 PT EVAL LOW COMPLEX 20 MIN: CPT

## 2019-09-20 NOTE — THERAPY EVALUATION
Adalberto Late  : 1965  Primary: [facility]  Secondary:  [address] [facility], [address]  [phone]   [phone]          OUTPATIENT PHYSICAL THERAPY:Initial Assessment 2019   ICD-10: Treatment Diagnosis: Pain in right knee (M25.561), Muscle weakness, generalized (M62.81)  Precautions/Allergies:   Crestor [rosuvastatin]; Dilaudid [hydromorphone (bulk)]; and Tylox [oxycodone-acetaminophen]   TREATMENT PLAN:  Effective Dates: 2019 TO 2019 (60 days). Frequency/Duration: 2 times a week for 6 weeks MEDICAL/REFERRING DIAGNOSIS:  Presence of right artificial knee joint [Z96.651]   DATE OF ONSET: Surgery on 19  REFERRING PHYSICIAN: Jaime Kong MD Orders: Evaluate and Treat  Return MD Appointment: TBD     INITIAL ASSESSMENT:  Mr. Neeta Parada presents status post right total knee arthroplasty on 19. His chief complaint is pain post operatively but also presents with decreased knee ROM, strength and mobility which limits his ADL function. He will benefit from skilled therapy to improve his knee pain, strength and mobility to return to highest level of function possible. PROBLEM LIST (Impacting functional limitations):  1. Decreased Strength  2. Decreased ADL/Functional Activities  3. Decreased Ambulation Ability/Technique  4. Decreased Balance  5. Increased Pain  6. Decreased Activity Tolerance  7. Decreased Flexibility/Joint Mobility  8. Decreased [place] with Home Exercise Program INTERVENTIONS PLANNED: (Treatment may consist of any combination of the following)  1. Balance Exercise  2. Gait Training  3. Home Exercise Program (HEP)  4. Manual Therapy  5. Neuromuscular Re-education/Strengthening  6. Range of Motion (ROM)  7. Therapeutic Exercise/Strengthening     GOALS: (Goals have been discussed and agreed upon with patient.)  Discharge Goals: Time Frame: 6 weeks  1.  Patient will be independent with home exercise program without assistance from therapist.   2. Patient will report LEFS score to 55/80 or more to demonstrate improved functional capacity. 3. Patient will demonstrate 0 to 125 degrees of right knee motion to resume normal ADL function. 4. Patient will perform reciprocal stair performance to demonstrate improved functional mobility. OUTCOME MEASURE:   Tool Used: Lower Extremity Functional Scale (LEFS)  Score:  Initial: 36/80 (9/22/19) Most Recent: X/80 (Date: -- )   Interpretation of Score: 20 questions each scored on a 5 point scale with 0 representing \"extreme difficulty or unable to perform\" and 4 representing \"no difficulty\". The lower the score, the greater the functional disability. 80/80 represents no disability. Minimal detectable change is 9 points. MEDICAL NECESSITY:   · Patient is expected to demonstrate progress in strength, range of motion and balance to increase independence with ADL tasks. REASON FOR SERVICES/OTHER COMMENTS:  · Patient continues to require present interventions due to patient's inability to sit, stand or walk without pain. Total Duration:  PT Patient Time In/Time Out  Time In: 1300  Time Out: 1400    Rehabilitation Potential For Stated Goals: Good  Regarding Yasmani Vlads therapy, I certify that the treatment plan above will be carried out by a therapist or under their direction. Thank you for this referral,  Earle Gu     Referring Physician Signature: Jaime Kong,* No Signature is Required for this note. PAIN/SUBJECTIVE:   Initial: Pain Intensity 1: 2 /10 Post Session:  1/10   HISTORY:   History of Injury/Illness (Reason for Referral):  Adam Perez presents status post right TKA on 8/27/19. He reports having had pain for several years before ultimately deciding to undergo current surgery. His goal is to improve his strength and motion to prepare to have other knee done at end of the year.    Past Medical History/Comorbidities: Mr. Randolph Antunez  has a past medical history of Arrhythmia (2009-- per pt caused by dilaudid), Arthritis, Diabetes mellitus, type 2 ([address] Elevated cholesterol, GERD (gastroesophageal reflux disease), History of kidney stones (2002), Hypertension, Morbid obesity ([facility] [address] PUD (peptic ulcer disease) (2003), Sleep apnea, Testosterone deficiency, and Vitamin D deficiency (06/11/2014). Mr. Randolph Antunez  has a past surgical history that includes hx colonoscopy (07/17/2015); pr neurological procedure unlisted (3/2011 at [facility]); hx orthopaedic; hx heent (2009); and hx gi (early 1990s). Social History/Living Environment:     Lives in private setting home, no barriers to progress. Prior Level of Function/Work/Activity:  Retired     Ambulatory/Rehab Services H2 Model Falls Risk Assessment   Risk Factors:       (1)  Gender [Male] Ability to Rise from Chair:       (1)  Pushes up, successful in one attempt   [place] [address]:       No modifications necessary   Total: (5 or greater = High Risk): 2   ©2010 [facility] of [address] Patent #[id number]. Federal Law prohibits the replication, distribution or use without written permission from [facility] [employer]   Current Medications:       Current Outpatient Medications:     oxyCODONE IR (ROXICODONE) 10 mg tab immediate release tablet, Take 10 mg by mouth every six (6) hours as needed for Pain., Disp: , Rfl:     rosuvastatin (CRESTOR) 40 mg tablet, Take 40 mg by mouth daily. , Disp: , Rfl:     psyllium seed (METAMUCIL SUGAR FREE PO), Take 2 Units by mouth three (3) times daily as needed for Other (constipation). 2 tsp., Disp: , Rfl:     enalapril 5 mg tab 5 mg, hydroCHLOROthiazide 12.5 mg cap 12.5 mg, Take 1 Dose by mouth daily. , Disp: , Rfl:     aspirin delayed-release 81 mg tablet, Take 1 Tab by mouth every twelve (12) hours every twelve (12) hours. , Disp: 60 Tab, Rfl: 0    melatonin 10 mg tab, Take 1 Tab by mouth nightly., Disp: , Rfl:   Fenofibrate (LIPOFEN) 150 mg cap, Take 150 mg by mouth every morning for 30 days. , Disp: 90 Cap, Rfl: 3    SITagliptin-metFORMIN (JANUMET XR) 50-1,000 mg TM24, 1 p.o. twice daily, Disp: 60 Tab, Rfl: 11    enalapril-hydroCHLOROthiazide (VASERETIC) 10-25 mg tablet, Take 1 Tab by mouth daily for 30 days. , Disp: 30 Tab, Rfl: 11    rosuvastatin (CRESTOR) 40 mg tablet, Take 1 Tab by mouth daily for 30 days. (Patient taking differently: Take 20 mg by mouth daily.), Disp: 30 Tab, Rfl: 11    propranolol (INDERAL) 40 mg tablet, Take 2 Tabs by mouth two (2) times a day. (Patient taking differently: Take 80 mg by mouth daily. Pt takes 2 tablets 1x each day. ), Disp: 120 Tab, Rfl: 11    fluticasone (FLONASE) 50 mcg/actuation nasal spray, 2 Sprays by Both Nostrils route daily. (Patient taking differently: 2 Sprays by Both Nostrils route daily as needed.), Disp: 1 Bottle, Rfl: 6   Date Last Reviewed:  9/22/2019     Number of Personal Factors/Comorbidities that affect the Plan of Care: 0: LOW COMPLEXITY   EXAMINATION:   Observation/Gait Assessment:  Patient ambulates with slight antalgic pattern, no AD with inconsistent knee flexion through swing phase. Strength:       RIGHT LEFT    Knee extension  Good quad set 5/5    Knee flexion 4/5 5/5    Hip extension 4/5 4/5    Hip abduction 4/5 4/5    Hip flexion 4/5 4/5          ROM:       RIGHT LEFT    Knee extension  -5 -2    Knee flexion 118 124                Joint Mobility:        RIGHT LEFT     Patellofemoral Hypomobile Hypomobile          Functional Mobility:            Sit to stand UE support    Step up Step to pattern     Step down Step to pattern          Swelling/Edema:       RIGHT LEFT    Joint line  Mild None                Body Structures Involved:  1. Bones  2. Joints  3. Muscles Body Functions Affected:  1. Sensory/Pain  2. Neuromusculoskeletal  3. Movement Related Activities and Participation Affected:  1. General Tasks and Demands  2. Mobility  3.  Self Care  4. Domestic Life  5.  Community, Social and [place]   Number of elements (examined above) that affect the Plan of Care: 4+: HIGH COMPLEXITY   CLINICAL PRESENTATION:   Presentation: Stable and uncomplicated: LOW COMPLEXITY   CLINICAL DECISION MAKING:   Use of outcome tool(s) and clinical judgement create a POC that gives a: Clear prediction of patient's progress: LOW COMPLEXITY

## 2019-09-20 NOTE — PROGRESS NOTES
Carmenza Muñoz  : 1965  [facility]  [facility]:  [address] [facility], [facility]  [phone]   [phone]        OUTPATIENT PHYSICAL THERAPY: Daily Treatment Note 2019  Visit Count:  1    ICD-10: Treatment Diagnosis: Pain in right knee (M25.561), Muscle weakness, generalized (M62.81)  Precautions/Allergies:   Crestor [rosuvastatin]; Dilaudid [hydromorphone (bulk)]; and Tylox [oxycodone-acetaminophen]   TREATMENT PLAN:  Effective Dates: 2019 TO 2019 (60 days). Frequency/Duration: 2 times a week for 6 weeks    Pre-treatment Symptoms/Complaints:  Patient says his knee has felt \"pretty good\" the past few days after finishing his home health therapy. Pain: Initial: Pain Intensity 1: 2 /10 Post Session:  1/10   Medications Last Reviewed:  2019  Updated Objective Findings:  See evaluation note from today  TREATMENT:     Therapeutic Exercise: (15 Minutes):  Exercises per grid below to improve mobility, strength and balance. Required minimal visual, verbal and manual cues to promote proper body alignment, promote proper body posture and promote proper body mechanics. Progressed resistance, range, repetitions and complexity of movement as indicated. Date:  19   Activity/Exercise Parameters   Patient Education Plan of care, HEP   Heel prop 1 min x 2   Quad sets 2 x 10   Knee flexion Sheet pull x 15   Bike 5 min for ROM               Treatment/Session Summary:    · Response to Treatment:  Patient displays excellent initial ROM and has good tolerance to HEP tasks today. · Communication/Consultation:  None today  · Equipment provided today:  None today  · Recommendations/Intent for next treatment session: Next visit will focus on improving knee strength and ROM. Total Treatment Billable Duration:  15 minutes (45 minute evaluation)  PT Patient Time In/Time Out  Time In: 1300  Time Out: [address] Riccardo    Future Appointments   Date Time Provider Yessy Queen   [facility]2019  [facility]   9/27/2019  1:00 PM Marcia Almonte Her [facility]   10/1/2019 10:30 AM Marcia Almonte Her [facility]   10/3/2019  9:30 AM Lawence Hurt [facility]   10/8/2019  3:30 PM Lawence Hurt [facility]   10/10/2019  1:00 PM Lawence Hurt [facility]   10/16/2019  3:00 PM Lawence Hurt [facility]   10/18/2019  1:00 PM Lawence Hurt [facility]   12/12/2019  8:40 AM PST LAB [facility] PST PST   12/19/2019  8:20 AM Julia Bauman DO [facility] PST PST

## 2019-09-23 ENCOUNTER — HOSPITAL ENCOUNTER (OUTPATIENT)
Dept: PHYSICAL THERAPY | Age: 54
Discharge: HOME | End: 2019-09-23
Payer: COMMERCIAL

## 2019-09-23 PROCEDURE — 97110 THERAPEUTIC EXERCISES: CPT

## 2019-09-23 PROCEDURE — 97140 MANUAL THERAPY 1/> REGIONS: CPT

## 2019-09-23 NOTE — PROGRESS NOTES
Kiera Sp  : 1965  Primary: [facility]  Secondary:  [address] at [address], [address] [place] [address]  [phone]   [phone]        OUTPATIENT PHYSICAL THERAPY: Daily Treatment Note 2019  Visit Count:  2    ICD-10: Treatment Diagnosis: Pain in right knee (M25.561), Muscle weakness, generalized (M62.81)  Precautions/Allergies:   Crestor [rosuvastatin]; Dilaudid [hydromorphone (bulk)]; and Tylox [oxycodone-acetaminophen]   TREATMENT PLAN:  Effective Dates: 2019 TO 2019 (60 days). Frequency/Duration: 2 times a week for 6 weeks    Pre-treatment Symptoms/Complaints:  Patient says his knee is stiff this afternoon. Pain: Initial: Pain Intensity 1: 1 /10 Post Session:  1/10   Medications Last Reviewed:  2019  Updated Objective Findings:  None Today  TREATMENT:     Therapeutic Exercise: (40 Minutes):  Exercises per grid below to improve mobility, strength and balance. Required minimal visual, verbal and manual cues to promote proper body alignment, promote proper body posture and promote proper body mechanics. Progressed resistance, range, repetitions and complexity of movement as indicated. Date:  2019     Activity/Exercise Parameters   Patient Education Plan of care, HEP   Heel prop 1 min x 3   Quad sets 2 x 10   Knee flexion Sheet pull x 15   Bike 5 min x 2, for ROM   SAQ 1 min x 3 each side   Side steps 20 ft x 6   Calf stretch 2 min   Gait training 3 laps; yelena step overs, 3 x 10 each side   Sit to stand 3 x 10     Manual Therapy (    Soft Tissue Mobilization Duration  Duration: 10 Minutes): Manual techniques to facilitate improved motion and decreased pain.  (Used abbreviations: MET - muscle energy technique; PNF - proprioceptive neuromuscular facilitation; NMR - neuromuscular re-education; a/p - anterior to posterior; p/a - posterior to anterior)   · Patellofemoral glides, all directions  · Scar mobilization Treatment/Session Summary:    · Response to Treatment:  Patient continues to have great tolerance to activity with has great tolerance to more ROM work today. · Communication/Consultation:  None today  · Equipment provided today:  None today  · Recommendations/Intent for next treatment session: Next visit will focus on improving knee strength and ROM. Total Treatment Billable Duration:  50 minutes  PT Patient Time In/Time Out  Time In: 1400  Time Out: [id number] [facility]    Future Appointments   Date Time Provider Yessy Queen   9/27/2019  1:00 PM Arely Montiel [facility]   10/1/2019 10:30 AM Nidia Gu [facility]   10/3/2019  9:30 AM Arely Montiel [facility]   10/8/2019  3:30 PM Arely Montiel [facility]   10/10/2019  1:00 PM Arely Montiel [facility]   10/16/2019  3:00 PM Arely Montiel [facility]   10/18/2019  1:00 PM Arely Montiel [facility]   12/12/2019  8:40 AM PST LAB [facility] PST PST   12/19/2019  8:20 AM Marlene Ludwig DO [facility] PST PST

## 2019-09-27 ENCOUNTER — HOSPITAL ENCOUNTER (OUTPATIENT)
Dept: PHYSICAL THERAPY | Age: 54
Discharge: HOME | End: 2019-09-27
Payer: COMMERCIAL

## 2019-09-27 PROCEDURE — 97140 MANUAL THERAPY 1/> REGIONS: CPT

## 2019-09-27 PROCEDURE — 97110 THERAPEUTIC EXERCISES: CPT

## 2019-09-27 NOTE — PROGRESS NOTES
Obdulio Esthela  : 1965  Primary: [facility]  Secondary:  [address] at [address], [address] [place] [address]  [phone]   [phone]        OUTPATIENT PHYSICAL THERAPY: Daily Treatment Note 2019  Visit Count:  3    ICD-10: Treatment Diagnosis: Pain in right knee (M25.561), Muscle weakness, generalized (M62.81)  Precautions/Allergies:   Crestor [rosuvastatin]; Dilaudid [hydromorphone (bulk)]; and Tylox [oxycodone-acetaminophen]   TREATMENT PLAN:  Effective Dates: 2019 TO 2019 (60 days). Frequency/Duration: 2 times a week for 6 weeks    Pre-treatment Symptoms/Complaints:  Patient says he had great follow up with doctor this week. Pain: Initial: Pain Intensity 1: 1 /10 Post Session:  1/10   Medications Last Reviewed:  2019  Updated Objective Findings:  None Today  TREATMENT:     Therapeutic Exercise: (45 Minutes):  Exercises per grid below to improve mobility, strength and balance. Required minimal visual, verbal and manual cues to promote proper body alignment, promote proper body posture and promote proper body mechanics. Progressed resistance, range, repetitions and complexity of movement as indicated. Date:  2019     Activity/Exercise Parameters   Patient Education Plan of care, HEP   Heel prop 1 min x 3   Quad sets 2 x 10   Knee flexion Prone quad stretch, 30s x 3   Bike 5 min x 2, for ROM   SAQ 1 min x 3 each side   Side steps 20 ft x 6   Calf stretch 2 min   Gait training 3 laps; yelena step overs, 3 x 10 each side   Sit to stand 3 x 10   Shuttle DL- 4 cord x 2 min; SL- 2 cord x 2 min      Manual Therapy (    Soft Tissue Mobilization Duration  Duration: 10 Minutes): Manual techniques to facilitate improved motion and decreased pain.  (Used abbreviations: MET - muscle energy technique; PNF - proprioceptive neuromuscular facilitation; NMR - neuromuscular re-education; a/p - anterior to posterior; p/a - posterior to anterior)   · Patellofemoral glides, all directions  · Scar mobilization       Treatment/Session Summary:    · Response to Treatment:  Yenifer Beck demonstrates excellent tolerance to initial closed chain strength/endurance tasks today without complaints of increased knee pain. Will plan to incorporate more stair work next visit. · Communication/Consultation:  None today  · Equipment provided today:  None today  · Recommendations/Intent for next treatment session: Next visit will focus on improving knee strength and ROM. Total Treatment Billable Duration:  55 minutes  PT Patient Time In/Time Out  Time In: 1345  Time Out: [address]  [facility]    Future Appointments   Date Time Provider Yessy Queen   10/1/2019 10:30 AM Lobito Iron [facility]   10/3/2019  9:30 AM Nena Mckinley [facility]   10/8/2019  3:30 PM Lobito Iron [facility]   10/10/2019  1:00 PM Lobito Iron [facility]   10/16/2019  3:00 PM Lobito Iron [facility]   10/18/2019  1:00 PM Lobito Iron [facility]   12/12/2019  8:40 AM PST LAB [facility] PST PST   12/19/2019  8:20 AM Marsha Jaramillo DO [facility] PST PST

## 2019-10-01 ENCOUNTER — HOSPITAL ENCOUNTER (OUTPATIENT)
Dept: PHYSICAL THERAPY | Age: 54
Discharge: HOME | End: 2019-10-01
Payer: COMMERCIAL

## 2019-10-01 PROCEDURE — 97110 THERAPEUTIC EXERCISES: CPT

## 2019-10-01 PROCEDURE — 97140 MANUAL THERAPY 1/> REGIONS: CPT

## 2019-10-01 NOTE — PROGRESS NOTES
Brendon Lee  : 1965  Primary: [facility]  Secondary:  [address] at [address], [address] [place] [address]  [phone]   [phone]        OUTPATIENT PHYSICAL THERAPY: Daily Treatment Note 10/1/2019  Visit Count:  4    ICD-10: Treatment Diagnosis: Pain in right knee (M25.561), Muscle weakness, generalized (M62.81)  Precautions/Allergies:   Crestor [rosuvastatin]; Dilaudid [hydromorphone (bulk)]; and Tylox [oxycodone-acetaminophen]   TREATMENT PLAN:  Effective Dates: 2019 TO 2019 (60 days). Frequency/Duration: 2 times a week for 6 weeks    Pre-treatment Symptoms/Complaints:  Patient reports his knee is stiff this morning. Pain: Initial: Pain Intensity 1: 2 10 Post Session:  1/10   Medications Last Reviewed:  10/1/2019  Updated Objective Findings:  None Today  TREATMENT:     Therapeutic Exercise: (45 Minutes):  Exercises per grid below to improve mobility, strength and balance. Required minimal visual, verbal and manual cues to promote proper body alignment, promote proper body posture and promote proper body mechanics. Progressed resistance, range, repetitions and complexity of movement as indicated. Date:  10/1/2019     Activity/Exercise Parameters   Patient Education Plan of care, HEP   Heel prop 1 min x 3   Quad sets 2 x 10 with towel target   Knee flexion Prone quad stretch, 30s x 3   Bike 5 min x 2, for ROM   SAQ 1 min x 3 each side   Side steps 20 ft x 6   Calf stretch 2 min   Gait training 3 laps; yelena step overs, 3 x 10 each side   Sit to stand 3 x 10   Shuttle DL- 4 cord x 2 min; SL- 3 cord x 2 min      Manual Therapy (    Soft Tissue Mobilization Duration  Duration: 10 Minutes): Manual techniques to facilitate improved motion and decreased pain.  (Used abbreviations: MET - muscle energy technique; PNF - proprioceptive neuromuscular facilitation; NMR - neuromuscular re-education; a/p - anterior to posterior; p/a - posterior to anterior)   · Patellofemoral glides, all directions  · Scar mobilization       Treatment/Session Summary:    · Response to Treatment:  Dominique Alexander has some initial complaints of knee stiffness but his overall mobility and complaints improve with movement today. He continues to make excellent progress. · Communication/Consultation:  None today  · Equipment provided today:  None today  · Recommendations/Intent for next treatment session: Next visit will focus on improving knee strength and ROM. Total Treatment Billable Duration:  55 minutes  PT Patient Time In/Time Out  Time In: 1030  Time Out: [address] [facility].  [facility]    Future Appointments   Date Time Provider Yessy Queen   10/3/2019  9:30 AM Rafaela Monterroso [facility]   10/8/2019  3:30 PM [facility]   10/10/2019  1:00 PM [facility]   10/16/2019  3:00 PM [facility]   10/18/2019  1:00 PM [facility]   12/12/2019  8:40 AM PST LAB [facility] PST PST   12/19/2019  8:20 AM Edae Come, DO [facility] PST PST

## 2019-10-03 ENCOUNTER — HOSPITAL ENCOUNTER (OUTPATIENT)
Dept: PHYSICAL THERAPY | Age: 54
Discharge: HOME | End: 2019-10-03
Payer: COMMERCIAL

## 2019-10-03 PROCEDURE — 97110 THERAPEUTIC EXERCISES: CPT

## 2019-10-03 PROCEDURE — 97140 MANUAL THERAPY 1/> REGIONS: CPT

## 2019-10-03 NOTE — PROGRESS NOTES
Damaso Cornell  : 1965  Primary: [facility]  Secondary:  [address] at [place] [id number] [address], [address] [place] [address]  [phone]   [phone]        OUTPATIENT PHYSICAL THERAPY: Daily Treatment Note 10/3/2019  Visit Count:  5    ICD-10: Treatment Diagnosis: Pain in right knee (M25.561), Muscle weakness, generalized (M62.81)  Precautions/Allergies:   Crestor [rosuvastatin]; Dilaudid [hydromorphone (bulk)]; and Tylox [oxycodone-acetaminophen]   TREATMENT PLAN:  Effective Dates: 2019 TO 2019 (60 days). Frequency/Duration: 2 times a week for 6 weeks    Pre-treatment Symptoms/Complaints:  Patient says he had some discomfort in bed last night but is otherwise feeling good this morning. Pain: Initial: Pain Intensity 1: 1 /10 Post Session:  1/10   Medications Last Reviewed:  10/3/2019  Updated Objective Findings:  None Today  TREATMENT:     Therapeutic Exercise: (45 Minutes):  Exercises per grid below to improve mobility, strength and balance. Required minimal visual, verbal and manual cues to promote proper body alignment, promote proper body posture and promote proper body mechanics. Progressed resistance, range, repetitions and complexity of movement as indicated. Date:  10/3/2019     Activity/Exercise Parameters   Patient Education Plan of care, HEP   Heel prop 1 min x 3   Quad sets X30, with towel   Knee flexion Prone quad stretch, 1 min x 3   Bike 8 min   SAQ To SLR, 3 x 10   Side steps 20 ft x 6   Calf stretch 2 min   Gait training High knee to HS curl, 2 x 10   Sit to stand 3 x 10   Shuttle DL- 4 cord x 2 min; SL- 3 cord x 2 min    Calf raise 2 x 15   Step up and over Lateral, 2 min     Manual Therapy (    Soft Tissue Mobilization Duration  Duration: 10 Minutes): Manual techniques to facilitate improved motion and decreased pain.  (Used abbreviations: MET - muscle energy technique; PNF - proprioceptive neuromuscular facilitation; NMR - neuromuscular re-education; a/p - anterior to posterior; p/a - posterior to anterior)   · Patellofemoral glides, all directions  · Scar mobilization       Treatment/Session Summary:    · Response to Treatment:  Jeanne Huerta displays good concentric strength with step up and overs today and continues to make steady strength and mobility progress. · Communication/Consultation:  None today  · Equipment provided today:  None today  · Recommendations/Intent for next treatment session: Next visit will focus on improving knee strength and ROM. Total Treatment Billable Duration:  55 minutes  PT Patient Time In/Time Out  Time In: 0920  Time Out: [address] [facility].  [facility]    Future Appointments   Date Time Provider Yessy Queen   10/8/2019  3:30 PM [facility]   10/10/2019  1:00 PM Jonah Gu [facility]   10/16/2019  3:00 PM An Brown [facility]   10/18/2019  1:00 PM An Brown [facility]   12/12/2019  8:40 AM PST LAB [facility] PST PST   12/19/2019  8:20 AM DO ANGELY Bassett PST PST

## 2019-10-08 ENCOUNTER — HOSPITAL ENCOUNTER (OUTPATIENT)
Dept: PHYSICAL THERAPY | Age: 54
Discharge: HOME | End: 2019-10-08
Payer: COMMERCIAL

## 2019-10-08 ENCOUNTER — HOSPITAL ENCOUNTER (OUTPATIENT)
Dept: SLEEP MEDICINE | Age: 54
Discharge: HOME | End: 2019-10-08
Payer: COMMERCIAL

## 2019-10-08 PROCEDURE — 97140 MANUAL THERAPY 1/> REGIONS: CPT

## 2019-10-08 PROCEDURE — 95806 SLEEP STUDY UNATT&RESP EFFT: CPT

## 2019-10-08 PROCEDURE — 97110 THERAPEUTIC EXERCISES: CPT

## 2019-10-08 NOTE — PROGRESS NOTES
Anabell Tescott  : 1965  [facility]  Secondary:  [address] at [address], [address] [place] [address]  [phone]   [phone]        OUTPATIENT PHYSICAL THERAPY: Daily Treatment Note 10/8/2019  Visit Count:  6    ICD-10: Treatment Diagnosis: Pain in right knee (M25.561), Muscle weakness, generalized (M62.81)  Precautions/Allergies:   Crestor [rosuvastatin]; Dilaudid [hydromorphone (bulk)]; and Tylox [oxycodone-acetaminophen]   TREATMENT PLAN:  Effective Dates: 2019 TO 2019 (60 days). Frequency/Duration: 2 times a week for 6 weeks    Pre-treatment Symptoms/Complaints:  Patient reports his knee feels less stiff going up/down stairs this week. Pain: Initial: Pain Intensity 1: 1 /10 Post Session:  1/10   Medications Last Reviewed:  10/8/2019  Updated Objective Findings:  None Today  TREATMENT:     Therapeutic Exercise: (45 Minutes):  Exercises per grid below to improve mobility, strength and balance. Required minimal visual, verbal and manual cues to promote proper body alignment, promote proper body posture and promote proper body mechanics. Progressed resistance, range, repetitions and complexity of movement as indicated. Date:  10/8/2019     Activity/Exercise Parameters   Patient Education Plan of care, HEP   Heel prop 1 min x 3   Quad sets X30, with towel   Knee flexion Prone quad stretch, 1 min x 3   Bike 8 min   SAQ 2 min x 2   Side steps 50 ft x 4   Calf stretch 2 min   Gait training --   Sit to stand 3 x 10   Shuttle DL- 5 cord x 2 min; SL- 3 cord x 2 min, Eccentric- 5 cord x 2 min   Calf raise 2 x 15   Step up and over Fwd, 2 x 25      Manual Therapy (    Soft Tissue Mobilization Duration  Duration: 10 Minutes): Manual techniques to facilitate improved motion and decreased pain.  (Used abbreviations: MET - muscle energy technique; PNF - proprioceptive neuromuscular facilitation; NMR - neuromuscular re-education; a/p - anterior to posterior; p/a - posterior to anterior)   · Patellofemoral glides, all directions  · Scar mobilization       Treatment/Session Summary:    · Response to Treatment:  Jeanne Huerta is able to perform more eccentric quad activities without complaints of pain today and is better able to perform reciprocal stair pattern as result. · Communication/Consultation:  None today  · Equipment provided today:  None today  · Recommendations/Intent for next treatment session: Next visit will focus on improving knee strength and ROM. Total Treatment Billable Duration:  55 minutes  PT Patient Time In/Time Out  Time In: 1530  Time Out: [address]  [facility]    Future Appointments   Date Time Provider Yessy Queen   10/10/2019  [facility]   [facility]  [facility]   [facility]2019  [facility]   12/12/2019  8:40 AM PST LAB [facility] PST [facility]   12/19/2019  8:20 AM DO ANGELY Bassett PST PST

## 2019-10-10 ENCOUNTER — HOSPITAL ENCOUNTER (OUTPATIENT)
Dept: PHYSICAL THERAPY | Age: 54
Discharge: HOME | End: 2019-10-10
Payer: COMMERCIAL

## 2019-10-10 PROCEDURE — 97140 MANUAL THERAPY 1/> REGIONS: CPT

## 2019-10-10 PROCEDURE — 97110 THERAPEUTIC EXERCISES: CPT

## 2019-10-16 ENCOUNTER — HOSPITAL ENCOUNTER (OUTPATIENT)
Dept: PHYSICAL THERAPY | Age: 54
Discharge: HOME | End: 2019-10-16
Payer: COMMERCIAL

## 2019-10-16 PROCEDURE — 97110 THERAPEUTIC EXERCISES: CPT

## 2019-10-18 ENCOUNTER — HOSPITAL ENCOUNTER (OUTPATIENT)
Dept: PHYSICAL THERAPY | Age: 54
Discharge: HOME | End: 2019-10-18
Payer: COMMERCIAL

## 2019-10-18 PROCEDURE — 97110 THERAPEUTIC EXERCISES: CPT

## 2019-10-18 NOTE — PROGRESS NOTES
Sue Yvonne  : 1965  Primary: [facility]  Secondary:  [address] at [place] [address] [address], [address] [place] [address]  [phone]   [phone]        OUTPATIENT PHYSICAL THERAPY: Daily Treatment Note 10/18/2019  Visit Count:  9    ICD-10: Treatment Diagnosis: Pain in right knee (M25.561), Muscle weakness, generalized (M62.81)  Precautions/Allergies:   Crestor [rosuvastatin]; Dilaudid [hydromorphone (bulk)]; and Tylox [oxycodone-acetaminophen]   TREATMENT PLAN:  Effective Dates: 2019 TO 2019 (60 days). Frequency/Duration: 2 times a week for 6 weeks    Pre-treatment Symptoms/Complaints:  Patient says his knee feels great today. Pain: Initial: Pain Intensity 1: 1 /10 Post Session:  0/10   Medications Last Reviewed:  10/18/2019  Updated Objective Findings:  See discharge summary from today  TREATMENT:     Therapeutic Exercise: (40 Minutes):  Exercises per grid below to improve mobility, strength and balance. Required minimal visual, verbal and manual cues to promote proper body alignment, promote proper body posture and promote proper body mechanics. Progressed resistance, range, repetitions and complexity of movement as indicated. Date:  10/18/2019     Activity/Exercise Parameters   Patient Education  HEP review x 2 min   Heel prop --   Quad sets --   Knee flexion Strap pulls, 2 x 15   Bike For ROM, 5 min   LAQ 2 min   Side steps --   Calf stretch 2 min   Gait training --   Sit to stand 2 x 15   Shuttle DL- all cord x 30; SL- 5 cord x 30   Calf raise --   Stairs Up/down 1 flight x 3       Treatment/Session Summary:    · Response to Treatment:  Cara Santana has made excellent progress and will continue with HEP at this time. · Communication/Consultation:  None today  · Equipment provided today:  None today  · Recommendations: Discharge from physical therapy.      Total Treatment Billable Duration:  40 minutes  PT Patient Time In/Time Out  Time In: 1250  Time Out: [address]  [place]    Future Appointments   Date Time Provider Yessy Queen   12/12/2019  8:40 AM PST LAB [facility] PST PST   12/19/2019  8:20 AM DO ANGELY Cedeno PST PST

## 2019-10-18 NOTE — THERAPY EVALUATION
Compa Whelan  : 1965  Primary: Magdalene [facility]rm[facility]  Secondary:  [address] at [address]  [phone]   [phone]          OUTPATIENT PHYSICAL THERAPY:Discharge Summary 10/18/2019   ICD-10: Treatment Diagnosis: Pain in right knee (M25.561), Muscle weakness, generalized (M62.81)  Precautions/Allergies:   Crestor [rosuvastatin]; Dilaudid [hydromorphone (bulk)]; and Tylox [oxycodone-acetaminophen]   TREATMENT PLAN:  Effective Dates: 2019 TO 2019 (60 days). Frequency/Duration: 2 times a week for 6 weeks MEDICAL/REFERRING DIAGNOSIS:  Presence of right artificial knee joint [Z96.651]   DATE OF ONSET: Surgery on 19  REFERRING PHYSICIAN: Seven Gonzalez MD Orders: Evaluate and Treat  Return MD Appointment: TBD     INITIAL ASSESSMENT:  Mr. Dianne Tucker presents status post right total knee arthroplasty on 19. His chief complaint is pain post operatively but also presents with decreased knee ROM, strength and mobility which limits his ADL function. He will benefit from skilled therapy to improve his knee pain, strength and mobility to return to highest level of function possible. 10/18/19: Patient has made excellent progress and will continue with HEP at this time. PROBLEM LIST (Impacting functional limitations):  1. Decreased Strength  2. Decreased ADL/Functional Activities  3. Decreased Ambulation Ability/Technique  4. Decreased Balance  5. Increased Pain  6. Decreased Activity Tolerance  7. Decreased Flexibility/Joint Mobility  8. Decreased [place] with Home Exercise Program INTERVENTIONS PLANNED: (Treatment may consist of any combination of the following)  1. Balance Exercise  2. Gait Training  3. Home Exercise Program (HEP)  4. Manual Therapy  5. Neuromuscular Re-education/Strengthening  6. Range of Motion (ROM)  7.  Therapeutic Exercise/Strengthening     GOALS: (Goals have been discussed and agreed upon with patient.)  Discharge Goals: Time Frame: 6 weeks  1. Patient will be independent with home exercise program without assistance from therapist. MET  2. Patient will report LEFS score to 55/80 or more to demonstrate improved functional capacity. MET  3. Patient will demonstrate 0 to 125 degrees of right knee motion to resume normal ADL function. MET  4. Patient will perform reciprocal stair performance to demonstrate improved functional mobility. MET      OUTCOME MEASURE:   Tool Used: Lower Extremity Functional Scale (LEFS)  Score:  Initial: 36/80 (9/22/19) Most Recent: 76/80 (Date:10/18/19 )   Interpretation of Score: 20 questions each scored on a 5 point scale with 0 representing \"extreme difficulty or unable to perform\" and 4 representing \"no difficulty\". The lower the score, the greater the functional disability. 80/80 represents no disability. Minimal detectable change is 9 points. Total Duration:  PT Patient Time In/Time Out  Time In: 1250  Time Out: 1330    Rehabilitation Potential For Stated Goals: Good       PAIN/SUBJECTIVE:   Initial: Pain Intensity 1: 1 /10 Post Session:  0/10   HISTORY:   History of Injury/Illness (Reason for Referral):  Farzana Rapp presents status post right TKA on 8/27/19. He reports having had pain for several years before ultimately deciding to undergo current surgery. His goal is to improve his strength and motion to prepare to have other knee done at end of the year. Past Medical History/Comorbidities:   Mr. Pedro Montes  has a past medical history of Arrhythmia (2009-- per pt caused by dilaudid), Arthritis, Diabetes mellitus, type 2 ([address] Elevated cholesterol, GERD (gastroesophageal reflux disease), History of kidney stones (2002), Hypertension, Morbid obesity ([address] PUD (peptic ulcer disease) (2003), Sleep apnea, Testosterone deficiency, and Vitamin D deficiency (06/11/2014).   Mr. Pedro Montes  has a past surgical history that includes hx colonoscopy (07/17/2015); pr neurological procedure unlisted (3/2011 at [facility]); hx orthopaedic; hx heent (2009); and hx gi (early 1990s). Social History/Living Environment:     Lives in private setting home, no barriers to progress. Prior Level of Function/Work/Activity:  Retired     Ambulatory/Rehab Services H2 Model Falls Risk Assessment   Risk Factors:       (1)  Gender [Male] Ability to Rise from Chair:       (1)  Pushes up, successful in one attempt   [address]:       No modifications necessary   Total: (5 or greater = High Risk): 2   ©2010 [facility] . [facility] Patent #[id number]. Federal Law prohibits the replication, distribution or use without written permission from [facility] cicayda   Current Medications:       Current Outpatient Medications:     oxyCODONE IR (ROXICODONE) 10 mg tab immediate release tablet, Take 10 mg by mouth every six (6) hours as needed for Pain., Disp: , Rfl:     rosuvastatin (CRESTOR) 40 mg tablet, Take 40 mg by mouth daily. , Disp: , Rfl:     psyllium seed (METAMUCIL SUGAR FREE PO), Take 2 Units by mouth three (3) times daily as needed for Other (constipation). 2 tsp., Disp: , Rfl:     enalapril 5 mg tab 5 mg, hydroCHLOROthiazide 12.5 mg cap 12.5 mg, Take 1 Dose by mouth daily. , Disp: , Rfl:     aspirin delayed-release 81 mg tablet, Take 1 Tab by mouth every twelve (12) hours every twelve (12) hours. , Disp: 60 Tab, Rfl: 0    melatonin 10 mg tab, Take 1 Tab by mouth nightly., Disp: , Rfl:     Fenofibrate (LIPOFEN) 150 mg cap, Take 150 mg by mouth every morning for 30 days. , Disp: 90 Cap, Rfl: 3    SITagliptin-metFORMIN (JANUMET XR) 50-1,000 mg TM24, 1 p.o. twice daily, Disp: 60 Tab, Rfl: 11    enalapril-hydroCHLOROthiazide (VASERETIC) 10-25 mg tablet, Take 1 Tab by mouth daily for 30 days. , Disp: 30 Tab, Rfl: 11    rosuvastatin (CRESTOR) 40 mg tablet, Take 1 Tab by mouth daily for 30 days.  (Patient taking differently: Take 20 mg by mouth daily.), Disp: 30 Tab, Rfl: 11    propranolol (INDERAL) 40 mg tablet, Take 2 Tabs by mouth two (2) times a day. (Patient taking differently: Take 80 mg by mouth daily. Pt takes 2 tablets 1x each day. ), Disp: 120 Tab, Rfl: 11    fluticasone (FLONASE) 50 mcg/actuation nasal spray, 2 Sprays by Both Nostrils route daily. (Patient taking differently: 2 Sprays by Both Nostrils route daily as needed.), Disp: 1 Bottle, Rfl: 6   Date Last Reviewed:  10/18/2019     Number of Personal Factors/Comorbidities that affect the Plan of Care: 0: LOW COMPLEXITY   EXAMINATION:   Observation/Gait Assessment:  Patient ambulates with slight antalgic pattern, no AD with inconsistent knee flexion through swing phase. Strength:       RIGHT LEFT    Knee extension  30 SLRs 5/5    Knee flexion 5/5 5/5    Hip extension 5/5 4/5    Hip abduction 5/5 4/5    Hip flexion 5/5 5/5          ROM:       RIGHT LEFT    Knee extension  0 -2    Knee flexion 130 124                Joint Mobility:        RIGHT LEFT     Patellofemoral Hypomobile Hypomobile          Functional Mobility:            Sit to stand 30+ Reps    Stepup Reciprocal    Step down Reciprocal          Swelling/Edema:       RIGHT LEFT    Joint line None None                Body Structures Involved:  1. Bones  2. Joints  3. Muscles Body Functions Affected:  1. Sensory/Pain  2. Neuromusculoskeletal  3. Movement Related Activities and Participation Affected:  1. General Tasks and Demands  2. Mobility  3. Self Care  4. Domestic Life  5.  Community, Social and [place]   Number of elements (examined above) that affect the Plan of Care: 4+: HIGH COMPLEXITY   CLINICAL PRESENTATION:   Presentation: Stable and uncomplicated: LOW COMPLEXITY   CLINICAL DECISION MAKING:   Use of outcome tool(s) and clinical judgement create a POC that gives a: Clear prediction of patient's progress: LOW COMPLEXITY

## 2019-11-04 ENCOUNTER — HOSPITAL ENCOUNTER (OUTPATIENT)
Dept: SLEEP MEDICINE | Age: 54
Discharge: HOME | End: 2019-11-04
Attending: INTERNAL MEDICINE
Payer: COMMERCIAL

## 2019-11-04 PROCEDURE — 95811 POLYSOM 6/>YRS CPAP 4/> PARM: CPT

## 2019-11-27 ENCOUNTER — HOSPITAL ENCOUNTER (OUTPATIENT)
Dept: SURGERY | Age: 54
Discharge: HOME | End: 2019-11-27
Payer: COMMERCIAL

## 2019-11-27 VITALS
HEART RATE: 88 BPM | RESPIRATION RATE: 16 BRPM | SYSTOLIC BLOOD PRESSURE: 122 MMHG | DIASTOLIC BLOOD PRESSURE: 76 MMHG | BODY MASS INDEX: 36.75 KG/M2 | OXYGEN SATURATION: 94 % | HEIGHT: 69 IN | WEIGHT: 248.1 LBS | TEMPERATURE: 97.5 F

## 2019-11-27 LAB
ANION GAP SERPL CALC-SCNC: 7 MMOL/L (ref 7–16)
APTT PPP: 31.7 SEC (ref 24.7–39.8)
BACTERIA SPEC CULT: NORMAL
BASOPHILS # BLD: 0.1 K/UL (ref 0–0.2)
BASOPHILS NFR BLD: 1 % (ref 0–2)
BUN SERPL-MCNC: 11 MG/DL (ref 6–23)
CALCIUM SERPL-MCNC: 9.7 MG/DL (ref 8.3–10.4)
CHLORIDE SERPL-SCNC: 102 MMOL/L (ref 98–107)
CO2 SERPL-SCNC: 26 MMOL/L (ref 21–32)
CREAT SERPL-MCNC: 0.97 MG/DL (ref 0.8–1.5)
DIFFERENTIAL METHOD BLD: ABNORMAL
EOSINOPHIL # BLD: 0.2 K/UL (ref 0–0.8)
EOSINOPHIL NFR BLD: 3 % (ref 0.5–7.8)
ERYTHROCYTE [DISTWIDTH] IN BLOOD BY AUTOMATED COUNT: 13.4 % (ref 11.9–14.6)
EST. AVERAGE GLUCOSE BLD GHB EST-MCNC: 148 MG/DL
GLUCOSE BLD STRIP.AUTO-MCNC: 143 MG/DL (ref 65–100)
GLUCOSE SERPL-MCNC: 144 MG/DL (ref 65–100)
HBA1C MFR BLD: 6.8 %
HCT VFR BLD AUTO: 41.3 % (ref 41.1–50.3)
HGB BLD-MCNC: 13.3 G/DL (ref 13.6–17.2)
IMM GRANULOCYTES # BLD AUTO: 0 K/UL (ref 0–0.5)
IMM GRANULOCYTES NFR BLD AUTO: 0 % (ref 0–5)
INR PPP: 1
LYMPHOCYTES # BLD: 2.1 K/UL (ref 0.5–4.6)
LYMPHOCYTES NFR BLD: 24 % (ref 13–44)
MCH RBC QN AUTO: 26.2 PG (ref 26.1–32.9)
MCHC RBC AUTO-ENTMCNC: 32.2 G/DL (ref 31.4–35)
MCV RBC AUTO: 81.3 FL (ref 79.6–97.8)
MONOCYTES # BLD: 0.6 K/UL (ref 0.1–1.3)
MONOCYTES NFR BLD: 7 % (ref 4–12)
NEUTS SEG # BLD: 5.8 K/UL (ref 1.7–8.2)
NEUTS SEG NFR BLD: 66 % (ref 43–78)
NRBC # BLD: 0 K/UL (ref 0–0.2)
PLATELET # BLD AUTO: 370 K/UL (ref 150–450)
PMV BLD AUTO: 9.2 FL (ref 9.4–12.3)
POTASSIUM SERPL-SCNC: 3.7 MMOL/L (ref 3.5–5.1)
PROTHROMBIN TIME: 13.9 SEC (ref 11.7–14.5)
RBC # BLD AUTO: 5.08 M/UL (ref 4.23–5.6)
SERVICE CMNT-IMP: NORMAL
SODIUM SERPL-SCNC: 135 MMOL/L (ref 136–145)
WBC # BLD AUTO: 8.7 K/UL (ref 4.3–11.1)

## 2019-11-27 PROCEDURE — 85730 THROMBOPLASTIN TIME PARTIAL: CPT

## 2019-11-27 PROCEDURE — 85610 PROTHROMBIN TIME: CPT

## 2019-11-27 PROCEDURE — 83036 HEMOGLOBIN GLYCOSYLATED A1C: CPT

## 2019-11-27 PROCEDURE — 85025 COMPLETE CBC W/AUTO DIFF WBC: CPT

## 2019-11-27 PROCEDURE — 80048 BASIC METABOLIC PNL TOTAL CA: CPT

## 2019-11-27 PROCEDURE — 82962 GLUCOSE BLOOD TEST: CPT

## 2019-11-27 PROCEDURE — 87641 MR-STAPH DNA AMP PROBE: CPT

## 2019-11-27 RX ORDER — TESTOSTERONE 20.25 MG/1.25G
40.5 GEL TOPICAL DAILY
COMMUNITY
End: 2019-12-04

## 2019-11-27 NOTE — PERIOP NOTES
Patient verified name and . Order for consent found in EHR and matches case posting; patient verified. Left total knee arthroplasty    Type 3 surgery, PAT walk in assessment complete. Labs per surgeon: cbc, bmp, PT, PTT, MRSA nasal swab, HgbA1C; results pending. Labs per anesthesia protocol: POC glucose- 143. EKG:Done 19- within anesthesia guidelines. MRSA/MSSA swab collected; pharmacy to review and dose antibiotic as appropriate. Hospital approved surgical skin cleanser and instructions to return bottle on DOS given per hospital policy. Patient provided with handouts including Guide to Surgery, Pain Management, Hand Hygiene, Blood Transfusion Education, and [place] [facility] Brochure. Patient answered medical/surgical history questions at their best of ability. All prior to admission medications documented in [facility]. Original medication prescription bottle was visualized during patient appointment. Patient instructed to hold all vitamins/supplements 21 days prior to surgery and NSAIDS 5 days prior to surgery. Patient instructed to continue previous medications as prescribed prior to surgery and to take the following medications the day of surgery according to anesthesia guidelines with a small sip of water: crestor, propranolol and testosterone gel. Patient teach back successful and patient demonstrates knowledge of instruction.

## 2019-11-27 NOTE — PERIOP NOTES
PLEASE CONTINUE TAKING ALL PRESCRIPTION MEDICATIONS UP TO THE DAY OF SURGERY UNLESS OTHERWISE DIRECTED BELOW. DISCONTINUE all vitamins and supplements 21 days prior to surgery. Home Medications to take  the day of surgery    Pronanolol   Testosterone gel    Rosuvastatin      Home Medications   to Hold   None            Comments   Bring C-pap, enalapril and testosterone gel to the hospital on the day pf surgery              Please do not bring home medications with you on the day of surgery unless otherwise directed by your nurse. If you are instructed to bring home medications, please give them to your nurse as they will be administered by the nursing staff. If you have any questions, please call [address] [place] [address] [phone] or [facility] [phone].

## 2019-11-27 NOTE — PERIOP NOTES
Lab results within anesthesia guidelines. Lab results sent to PCP per surgeon's request.       Recent Results (from the past 12 hour(s))   CBC WITH AUTOMATED DIFF    Collection Time: 11/27/19 12:28 PM   Result Value Ref Range    WBC 8.7 4.3 - 11.1 K/uL    RBC 5.08 4.23 - 5.6 M/uL    HGB 13.3 (L) 13.6 - 17.2 g/dL    HCT 41.3 41.1 - 50.3 %    MCV 81.3 79.6 - 97.8 FL    MCH 26.2 26.1 - 32.9 PG    MCHC 32.2 31.4 - 35.0 g/dL    RDW 13.4 11.9 - 14.6 %    PLATELET 512 499 - 702 K/uL    MPV 9.2 (L) 9.4 - 12.3 FL    ABSOLUTE NRBC 0.00 0.0 - 0.2 K/uL    DF AUTOMATED      NEUTROPHILS 66 43 - 78 %    LYMPHOCYTES 24 13 - 44 %    MONOCYTES 7 4.0 - 12.0 %    EOSINOPHILS 3 0.5 - 7.8 %    BASOPHILS 1 0.0 - 2.0 %    IMMATURE GRANULOCYTES 0 0.0 - 5.0 %    ABS. NEUTROPHILS 5.8 1.7 - 8.2 K/UL    ABS. LYMPHOCYTES 2.1 0.5 - 4.6 K/UL    ABS. MONOCYTES 0.6 0.1 - 1.3 K/UL    ABS. EOSINOPHILS 0.2 0.0 - 0.8 K/UL    ABS. BASOPHILS 0.1 0.0 - 0.2 K/UL    ABS. IMM. GRANS. 0.0 0.0 - 0.5 K/UL   HEMOGLOBIN A1C WITH EAG    Collection Time: 11/27/19 12:28 PM   Result Value Ref Range    Hemoglobin A1c 6.8 %    Est. average glucose 444 mg/dL   METABOLIC PANEL, BASIC    Collection Time: 11/27/19 12:28 PM   Result Value Ref Range    Sodium 135 (L) 136 - 145 mmol/L    Potassium 3.7 3.5 - 5.1 mmol/L    Chloride 102 98 - 107 mmol/L    CO2 26 21 - 32 mmol/L    Anion gap 7 7 - 16 mmol/L    Glucose 144 (H) 65 - 100 mg/dL    BUN 11 6 - 23 MG/DL    Creatinine 0.97 0.8 - 1.5 MG/DL    GFR est AA >60 >60 ml/min/1.73m2    GFR est non-AA >60 >60 ml/min/1.73m2    Calcium 9.7 8.3 - 10.4 MG/DL   MSSA/MRSA SC BY PCR, NASAL SWAB    Collection Time: 11/27/19 12:28 PM   Result Value Ref Range    Special Requests: NASAL      Culture result:        SA target not detected. A MRSA NEGATIVE, SA NEGATIVE test result does not preclude MRSA or SA nasal colonization.    PROTHROMBIN TIME + INR    Collection Time: 11/27/19 12:28 PM   Result Value Ref Range Prothrombin time 13.9 11.7 - 14.5 sec    INR 1.0     PTT    Collection Time: 11/27/19 12:28 PM   Result Value Ref Range    aPTT 31.7 24.7 - 39.8 SEC   GLUCOSE, POC    Collection Time: 11/27/19 12:36 PM   Result Value Ref Range    Glucose (POC) 143 (H) 65 - 100 mg/dL

## 2019-12-02 ENCOUNTER — ANESTHESIA EVENT (OUTPATIENT)
Dept: SURGERY | Age: 54
DRG: 470 | End: 2019-12-02
Payer: COMMERCIAL

## 2019-12-03 ENCOUNTER — ANESTHESIA (OUTPATIENT)
Dept: SURGERY | Age: 54
DRG: 470 | End: 2019-12-03
Payer: COMMERCIAL

## 2019-12-03 ENCOUNTER — HOSPITAL ENCOUNTER (INPATIENT)
Age: 54
LOS: 1 days | Discharge: HOME HEALTH | DRG: 470 | End: 2019-12-04
Attending: ORTHOPAEDIC SURGERY | Admitting: ORTHOPAEDIC SURGERY
Payer: COMMERCIAL

## 2019-12-03 ENCOUNTER — HOME HEALTH ADMISSION (OUTPATIENT)
Dept: HOME HEALTH SERVICES | Facility: HOME HEALTH | Age: 54
End: 2019-12-03
Payer: COMMERCIAL

## 2019-12-03 DIAGNOSIS — Z96.652 STATUS POST TOTAL KNEE REPLACEMENT, LEFT: ICD-10-CM

## 2019-12-03 DIAGNOSIS — M17.12 ARTHRITIS OF LEFT KNEE: Primary | ICD-10-CM

## 2019-12-03 LAB
GLUCOSE BLD STRIP.AUTO-MCNC: 136 MG/DL (ref 65–100)
HGB BLD-MCNC: 11.3 G/DL (ref 13.6–17.2)

## 2019-12-03 PROCEDURE — 94762 N-INVAS EAR/PLS OXIMTRY CONT: CPT

## 2019-12-03 PROCEDURE — 77030012935 HC DRSG AQUACEL BMS -B: Performed by: ORTHOPAEDIC SURGERY

## 2019-12-03 PROCEDURE — 36415 COLL VENOUS BLD VENIPUNCTURE: CPT

## 2019-12-03 PROCEDURE — 76060000035 HC ANESTHESIA 2 TO 2.5 HR: Performed by: ORTHOPAEDIC SURGERY

## 2019-12-03 PROCEDURE — 74011000258 HC RX REV CODE- 258: Performed by: ORTHOPAEDIC SURGERY

## 2019-12-03 PROCEDURE — 74011250636 HC RX REV CODE- 250/636: Performed by: ORTHOPAEDIC SURGERY

## 2019-12-03 PROCEDURE — 74011250636 HC RX REV CODE- 250/636: Performed by: NURSE ANESTHETIST, CERTIFIED REGISTERED

## 2019-12-03 PROCEDURE — 76010010054 HC POST OP PAIN BLOCK: Performed by: ORTHOPAEDIC SURGERY

## 2019-12-03 PROCEDURE — 97165 OT EVAL LOW COMPLEX 30 MIN: CPT

## 2019-12-03 PROCEDURE — 77010033678 HC OXYGEN DAILY

## 2019-12-03 PROCEDURE — 76010000171 HC OR TIME 2 TO 2.5 HR INTENSV-TIER 1: Performed by: ORTHOPAEDIC SURGERY

## 2019-12-03 PROCEDURE — 74011250636 HC RX REV CODE- 250/636: Performed by: ANESTHESIOLOGY

## 2019-12-03 PROCEDURE — 82962 GLUCOSE BLOOD TEST: CPT

## 2019-12-03 PROCEDURE — 77030003665 HC NDL SPN BBMI -A: Performed by: ANESTHESIOLOGY

## 2019-12-03 PROCEDURE — 77030002966 HC SUT PDS J&J -A: Performed by: ORTHOPAEDIC SURGERY

## 2019-12-03 PROCEDURE — 77030040922 HC BLNKT HYPOTHRM STRY -A: Performed by: ANESTHESIOLOGY

## 2019-12-03 PROCEDURE — 74011000250 HC RX REV CODE- 250: Performed by: ANESTHESIOLOGY

## 2019-12-03 PROCEDURE — 74011250637 HC RX REV CODE- 250/637: Performed by: PHYSICIAN ASSISTANT

## 2019-12-03 PROCEDURE — 0SRD0JA REPLACEMENT OF LEFT KNEE JOINT WITH SYNTHETIC SUBSTITUTE, UNCEMENTED, OPEN APPROACH: ICD-10-PCS | Performed by: ORTHOPAEDIC SURGERY

## 2019-12-03 PROCEDURE — C1776 JOINT DEVICE (IMPLANTABLE): HCPCS | Performed by: ORTHOPAEDIC SURGERY

## 2019-12-03 PROCEDURE — 76210000016 HC OR PH I REC 1 TO 1.5 HR: Performed by: ORTHOPAEDIC SURGERY

## 2019-12-03 PROCEDURE — 74011250637 HC RX REV CODE- 250/637: Performed by: ORTHOPAEDIC SURGERY

## 2019-12-03 PROCEDURE — 74011250637 HC RX REV CODE- 250/637: Performed by: ANESTHESIOLOGY

## 2019-12-03 PROCEDURE — 77030020256 HC SOL INJ NACL 0.9%  500ML: Performed by: ORTHOPAEDIC SURGERY

## 2019-12-03 PROCEDURE — 97535 SELF CARE MNGMENT TRAINING: CPT

## 2019-12-03 PROCEDURE — 94760 N-INVAS EAR/PLS OXIMETRY 1: CPT

## 2019-12-03 PROCEDURE — 77030019557 HC ELECTRD VES SEAL MEDT -F: Performed by: ORTHOPAEDIC SURGERY

## 2019-12-03 PROCEDURE — 77030006835 HC BLD SAW SAG STRY -B: Performed by: ORTHOPAEDIC SURGERY

## 2019-12-03 PROCEDURE — 77030018836 HC SOL IRR NACL ICUM -A: Performed by: ORTHOPAEDIC SURGERY

## 2019-12-03 PROCEDURE — 74011250636 HC RX REV CODE- 250/636: Performed by: PHYSICIAN ASSISTANT

## 2019-12-03 PROCEDURE — 77030003602 HC NDL NRV BLK BBMI -B: Performed by: ANESTHESIOLOGY

## 2019-12-03 PROCEDURE — 77030002933 HC SUT MCRYL J&J -A: Performed by: ORTHOPAEDIC SURGERY

## 2019-12-03 PROCEDURE — 65270000029 HC RM PRIVATE

## 2019-12-03 PROCEDURE — 76942 ECHO GUIDE FOR BIOPSY: CPT | Performed by: ORTHOPAEDIC SURGERY

## 2019-12-03 PROCEDURE — 77030031139 HC SUT VCRL2 J&J -A: Performed by: ORTHOPAEDIC SURGERY

## 2019-12-03 PROCEDURE — 77030040361 HC SLV COMPR DVT MDII -B

## 2019-12-03 PROCEDURE — 77030013708 HC HNDPC SUC IRR PULS STRY –B: Performed by: ORTHOPAEDIC SURGERY

## 2019-12-03 PROCEDURE — 77030007880 HC KT SPN EPDRL BBMI -B: Performed by: ANESTHESIOLOGY

## 2019-12-03 PROCEDURE — 85018 HEMOGLOBIN: CPT

## 2019-12-03 PROCEDURE — 74011000250 HC RX REV CODE- 250: Performed by: NURSE ANESTHETIST, CERTIFIED REGISTERED

## 2019-12-03 PROCEDURE — 74011000250 HC RX REV CODE- 250: Performed by: ORTHOPAEDIC SURGERY

## 2019-12-03 PROCEDURE — 97161 PT EVAL LOW COMPLEX 20 MIN: CPT

## 2019-12-03 DEVICE — POSTERIOR STABILIZED FEMORAL
Type: IMPLANTABLE DEVICE | Site: KNEE | Status: FUNCTIONAL
Brand: TRIATHLON

## 2019-12-03 DEVICE — TIBIAL BEARING INSERT
Type: IMPLANTABLE DEVICE | Site: KNEE | Status: FUNCTIONAL
Brand: TRIATHLON

## 2019-12-03 DEVICE — TIBIAL COMPONENT
Type: IMPLANTABLE DEVICE | Site: KNEE | Status: FUNCTIONAL
Brand: TRIATHLON

## 2019-12-03 RX ORDER — LIDOCAINE HYDROCHLORIDE 10 MG/ML
0.1 INJECTION INFILTRATION; PERINEURAL AS NEEDED
Status: DISCONTINUED | OUTPATIENT
Start: 2019-12-03 | End: 2019-12-03 | Stop reason: HOSPADM

## 2019-12-03 RX ORDER — HYDROMORPHONE HYDROCHLORIDE 2 MG/ML
0.5 INJECTION, SOLUTION INTRAMUSCULAR; INTRAVENOUS; SUBCUTANEOUS
Status: DISCONTINUED | OUTPATIENT
Start: 2019-12-03 | End: 2019-12-03

## 2019-12-03 RX ORDER — SODIUM CHLORIDE, SODIUM LACTATE, POTASSIUM CHLORIDE, CALCIUM CHLORIDE 600; 310; 30; 20 MG/100ML; MG/100ML; MG/100ML; MG/100ML
1000 INJECTION, SOLUTION INTRAVENOUS CONTINUOUS
Status: DISCONTINUED | OUTPATIENT
Start: 2019-12-03 | End: 2019-12-03 | Stop reason: HOSPADM

## 2019-12-03 RX ORDER — DEXAMETHASONE SODIUM PHOSPHATE 100 MG/10ML
INJECTION INTRAMUSCULAR; INTRAVENOUS AS NEEDED
Status: DISCONTINUED | OUTPATIENT
Start: 2019-12-03 | End: 2019-12-03 | Stop reason: HOSPADM

## 2019-12-03 RX ORDER — CELECOXIB 200 MG/1
200 CAPSULE ORAL EVERY 12 HOURS
Status: DISCONTINUED | OUTPATIENT
Start: 2019-12-03 | End: 2019-12-04 | Stop reason: HOSPADM

## 2019-12-03 RX ORDER — BUPIVACAINE HYDROCHLORIDE 7.5 MG/ML
INJECTION, SOLUTION INTRASPINAL
Status: DISCONTINUED | OUTPATIENT
Start: 2019-12-03 | End: 2019-12-03 | Stop reason: HOSPADM

## 2019-12-03 RX ORDER — VANCOMYCIN HYDROCHLORIDE 1 G/20ML
INJECTION, POWDER, LYOPHILIZED, FOR SOLUTION INTRAVENOUS AS NEEDED
Status: DISCONTINUED | OUTPATIENT
Start: 2019-12-03 | End: 2019-12-03 | Stop reason: HOSPADM

## 2019-12-03 RX ORDER — CEFAZOLIN SODIUM/WATER 2 G/20 ML
2 SYRINGE (ML) INTRAVENOUS EVERY 8 HOURS
Status: COMPLETED | OUTPATIENT
Start: 2019-12-03 | End: 2019-12-04

## 2019-12-03 RX ORDER — ROPIVACAINE HYDROCHLORIDE 2 MG/ML
INJECTION, SOLUTION EPIDURAL; INFILTRATION; PERINEURAL AS NEEDED
Status: DISCONTINUED | OUTPATIENT
Start: 2019-12-03 | End: 2019-12-03 | Stop reason: HOSPADM

## 2019-12-03 RX ORDER — ACETAMINOPHEN 10 MG/ML
1000 INJECTION, SOLUTION INTRAVENOUS ONCE
Status: COMPLETED | OUTPATIENT
Start: 2019-12-03 | End: 2019-12-03

## 2019-12-03 RX ORDER — AMOXICILLIN 250 MG
2 CAPSULE ORAL DAILY
Status: DISCONTINUED | OUTPATIENT
Start: 2019-12-04 | End: 2019-12-04 | Stop reason: HOSPADM

## 2019-12-03 RX ORDER — SODIUM CHLORIDE 9 MG/ML
100 INJECTION, SOLUTION INTRAVENOUS CONTINUOUS
Status: DISCONTINUED | OUTPATIENT
Start: 2019-12-03 | End: 2019-12-04 | Stop reason: HOSPADM

## 2019-12-03 RX ORDER — ONDANSETRON 2 MG/ML
4 INJECTION INTRAMUSCULAR; INTRAVENOUS
Status: DISCONTINUED | OUTPATIENT
Start: 2019-12-03 | End: 2019-12-03 | Stop reason: HOSPADM

## 2019-12-03 RX ORDER — ACETAMINOPHEN 500 MG
1000 TABLET ORAL ONCE
Status: COMPLETED | OUTPATIENT
Start: 2019-12-03 | End: 2019-12-03

## 2019-12-03 RX ORDER — NALOXONE HYDROCHLORIDE 0.4 MG/ML
.2-.4 INJECTION, SOLUTION INTRAMUSCULAR; INTRAVENOUS; SUBCUTANEOUS
Status: DISCONTINUED | OUTPATIENT
Start: 2019-12-03 | End: 2019-12-04 | Stop reason: HOSPADM

## 2019-12-03 RX ORDER — ONDANSETRON 2 MG/ML
INJECTION INTRAMUSCULAR; INTRAVENOUS AS NEEDED
Status: DISCONTINUED | OUTPATIENT
Start: 2019-12-03 | End: 2019-12-03 | Stop reason: HOSPADM

## 2019-12-03 RX ORDER — DIPHENHYDRAMINE HCL 25 MG
25 CAPSULE ORAL
Status: DISCONTINUED | OUTPATIENT
Start: 2019-12-03 | End: 2019-12-04 | Stop reason: HOSPADM

## 2019-12-03 RX ORDER — OXYCODONE HYDROCHLORIDE 5 MG/1
10 TABLET ORAL
Status: DISCONTINUED | OUTPATIENT
Start: 2019-12-03 | End: 2019-12-04 | Stop reason: HOSPADM

## 2019-12-03 RX ORDER — HYDROCHLOROTHIAZIDE 25 MG/1
25 TABLET ORAL DAILY
Status: DISCONTINUED | OUTPATIENT
Start: 2019-12-04 | End: 2019-12-04 | Stop reason: HOSPADM

## 2019-12-03 RX ORDER — SODIUM CHLORIDE 0.9 % (FLUSH) 0.9 %
5-40 SYRINGE (ML) INJECTION AS NEEDED
Status: DISCONTINUED | OUTPATIENT
Start: 2019-12-03 | End: 2019-12-04 | Stop reason: HOSPADM

## 2019-12-03 RX ORDER — DEXAMETHASONE SODIUM PHOSPHATE 100 MG/10ML
10 INJECTION INTRAMUSCULAR; INTRAVENOUS ONCE
Status: DISCONTINUED | OUTPATIENT
Start: 2019-12-04 | End: 2019-12-04 | Stop reason: HOSPADM

## 2019-12-03 RX ORDER — MORPHINE SULFATE 10 MG/ML
6 INJECTION, SOLUTION INTRAMUSCULAR; INTRAVENOUS
Status: DISCONTINUED | OUTPATIENT
Start: 2019-12-03 | End: 2019-12-04 | Stop reason: HOSPADM

## 2019-12-03 RX ORDER — ASPIRIN 81 MG/1
81 TABLET ORAL EVERY 12 HOURS
Status: DISCONTINUED | OUTPATIENT
Start: 2019-12-03 | End: 2019-12-04 | Stop reason: HOSPADM

## 2019-12-03 RX ORDER — LISINOPRIL 5 MG/1
10 TABLET ORAL DAILY
Status: DISCONTINUED | OUTPATIENT
Start: 2019-12-04 | End: 2019-12-04 | Stop reason: HOSPADM

## 2019-12-03 RX ORDER — MIDAZOLAM HYDROCHLORIDE 1 MG/ML
2 INJECTION, SOLUTION INTRAMUSCULAR; INTRAVENOUS
Status: COMPLETED | OUTPATIENT
Start: 2019-12-03 | End: 2019-12-03

## 2019-12-03 RX ORDER — FENTANYL CITRATE 50 UG/ML
INJECTION, SOLUTION INTRAMUSCULAR; INTRAVENOUS AS NEEDED
Status: DISCONTINUED | OUTPATIENT
Start: 2019-12-03 | End: 2019-12-03 | Stop reason: HOSPADM

## 2019-12-03 RX ORDER — ROPIVACAINE HYDROCHLORIDE 2 MG/ML
INJECTION, SOLUTION EPIDURAL; INFILTRATION; PERINEURAL
Status: COMPLETED | OUTPATIENT
Start: 2019-12-03 | End: 2019-12-03

## 2019-12-03 RX ORDER — CEFAZOLIN SODIUM/WATER 2 G/20 ML
2 SYRINGE (ML) INTRAVENOUS ONCE
Status: COMPLETED | OUTPATIENT
Start: 2019-12-03 | End: 2019-12-03

## 2019-12-03 RX ORDER — PROPRANOLOL HYDROCHLORIDE 40 MG/1
80 TABLET ORAL DAILY
Status: DISCONTINUED | OUTPATIENT
Start: 2019-12-04 | End: 2019-12-04 | Stop reason: HOSPADM

## 2019-12-03 RX ORDER — SODIUM CHLORIDE 0.9 % (FLUSH) 0.9 %
5-40 SYRINGE (ML) INJECTION EVERY 8 HOURS
Status: DISCONTINUED | OUTPATIENT
Start: 2019-12-03 | End: 2019-12-04 | Stop reason: HOSPADM

## 2019-12-03 RX ORDER — ACETAMINOPHEN 500 MG
1000 TABLET ORAL EVERY 6 HOURS
Status: DISCONTINUED | OUTPATIENT
Start: 2019-12-04 | End: 2019-12-04 | Stop reason: HOSPADM

## 2019-12-03 RX ORDER — PROPOFOL 10 MG/ML
INJECTION, EMULSION INTRAVENOUS
Status: DISCONTINUED | OUTPATIENT
Start: 2019-12-03 | End: 2019-12-03 | Stop reason: HOSPADM

## 2019-12-03 RX ORDER — EPHEDRINE SULFATE 50 MG/ML
INJECTION, SOLUTION INTRAVENOUS AS NEEDED
Status: DISCONTINUED | OUTPATIENT
Start: 2019-12-03 | End: 2019-12-03 | Stop reason: HOSPADM

## 2019-12-03 RX ORDER — ALBUTEROL SULFATE 0.83 MG/ML
2.5 SOLUTION RESPIRATORY (INHALATION) AS NEEDED
Status: DISCONTINUED | OUTPATIENT
Start: 2019-12-03 | End: 2019-12-03 | Stop reason: HOSPADM

## 2019-12-03 RX ORDER — MORPHINE SULFATE 10 MG/ML
4 INJECTION, SOLUTION INTRAMUSCULAR; INTRAVENOUS
Status: DISCONTINUED | OUTPATIENT
Start: 2019-12-03 | End: 2019-12-03 | Stop reason: HOSPADM

## 2019-12-03 RX ORDER — BACITRACIN 50000 [IU]/1
INJECTION, POWDER, FOR SOLUTION INTRAMUSCULAR AS NEEDED
Status: DISCONTINUED | OUTPATIENT
Start: 2019-12-03 | End: 2019-12-03 | Stop reason: HOSPADM

## 2019-12-03 RX ORDER — DEXAMETHASONE SODIUM PHOSPHATE 4 MG/ML
INJECTION, SOLUTION INTRA-ARTICULAR; INTRALESIONAL; INTRAMUSCULAR; INTRAVENOUS; SOFT TISSUE
Status: COMPLETED | OUTPATIENT
Start: 2019-12-03 | End: 2019-12-03

## 2019-12-03 RX ORDER — MIDAZOLAM HYDROCHLORIDE 1 MG/ML
INJECTION, SOLUTION INTRAMUSCULAR; INTRAVENOUS AS NEEDED
Status: DISCONTINUED | OUTPATIENT
Start: 2019-12-03 | End: 2019-12-03 | Stop reason: HOSPADM

## 2019-12-03 RX ORDER — ONDANSETRON 4 MG/1
4 TABLET, ORALLY DISINTEGRATING ORAL
Status: DISCONTINUED | OUTPATIENT
Start: 2019-12-03 | End: 2019-12-04 | Stop reason: HOSPADM

## 2019-12-03 RX ADMIN — CELECOXIB 200 MG: 200 CAPSULE ORAL at 20:38

## 2019-12-03 RX ADMIN — MORPHINE SULFATE 6 MG: 10 INJECTION INTRAVENOUS at 15:32

## 2019-12-03 RX ADMIN — FENTANYL CITRATE 25 MCG: 50 INJECTION INTRAMUSCULAR; INTRAVENOUS at 12:05

## 2019-12-03 RX ADMIN — BUPIVACAINE HYDROCHLORIDE IN DEXTROSE 13.5 MG: 7.5 INJECTION, SOLUTION SUBARACHNOID at 11:38

## 2019-12-03 RX ADMIN — EPHEDRINE SULFATE 10 MG: 50 INJECTION, SOLUTION INTRAVENOUS at 12:22

## 2019-12-03 RX ADMIN — LIDOCAINE HYDROCHLORIDE 0.1 ML: 10 INJECTION, SOLUTION INFILTRATION; PERINEURAL at 08:30

## 2019-12-03 RX ADMIN — Medication 2 G: at 20:38

## 2019-12-03 RX ADMIN — EPHEDRINE SULFATE 10 MG: 50 INJECTION, SOLUTION INTRAVENOUS at 12:19

## 2019-12-03 RX ADMIN — MORPHINE SULFATE 6 MG: 10 INJECTION INTRAVENOUS at 18:24

## 2019-12-03 RX ADMIN — DEXAMETHASONE SODIUM PHOSPHATE 5 MG: 4 INJECTION, SOLUTION INTRAMUSCULAR; INTRAVENOUS at 10:21

## 2019-12-03 RX ADMIN — Medication 10 ML: at 20:41

## 2019-12-03 RX ADMIN — ACETAMINOPHEN 1000 MG: 10 INJECTION, SOLUTION INTRAVENOUS at 17:20

## 2019-12-03 RX ADMIN — MORPHINE SULFATE 4 MG: 10 INJECTION INTRAVENOUS at 14:14

## 2019-12-03 RX ADMIN — SODIUM CHLORIDE, SODIUM LACTATE, POTASSIUM CHLORIDE, AND CALCIUM CHLORIDE: 600; 310; 30; 20 INJECTION, SOLUTION INTRAVENOUS at 11:48

## 2019-12-03 RX ADMIN — TRANEXAMIC ACID 1000 MG: 100 INJECTION, SOLUTION INTRAVENOUS at 11:26

## 2019-12-03 RX ADMIN — METFORMIN HYDROCHLORIDE: 500 TABLET ORAL at 17:20

## 2019-12-03 RX ADMIN — DEXAMETHASONE SODIUM PHOSPHATE 10 MG: 10 INJECTION INTRAMUSCULAR; INTRAVENOUS at 12:06

## 2019-12-03 RX ADMIN — ACETAMINOPHEN 1000 MG: 500 TABLET, FILM COATED ORAL at 08:30

## 2019-12-03 RX ADMIN — MIDAZOLAM 2 MG: 1 INJECTION INTRAMUSCULAR; INTRAVENOUS at 11:26

## 2019-12-03 RX ADMIN — ASPIRIN 81 MG: 81 TABLET, COATED ORAL at 20:38

## 2019-12-03 RX ADMIN — EPHEDRINE SULFATE 10 MG: 50 INJECTION, SOLUTION INTRAVENOUS at 12:38

## 2019-12-03 RX ADMIN — ONDANSETRON 4 MG: 2 INJECTION INTRAMUSCULAR; INTRAVENOUS at 12:06

## 2019-12-03 RX ADMIN — Medication 3 AMPULE: at 08:30

## 2019-12-03 RX ADMIN — Medication 10 ML: at 17:21

## 2019-12-03 RX ADMIN — Medication 1 AMPULE: at 20:38

## 2019-12-03 RX ADMIN — OXYCODONE HYDROCHLORIDE 10 MG: 5 TABLET ORAL at 20:43

## 2019-12-03 RX ADMIN — ROPIVACAINE HYDROCHLORIDE 40 MG: 2 INJECTION, SOLUTION EPIDURAL; INFILTRATION at 10:21

## 2019-12-03 RX ADMIN — PROPOFOL 100 MCG/KG/MIN: 10 INJECTION, EMULSION INTRAVENOUS at 11:39

## 2019-12-03 RX ADMIN — ACETAMINOPHEN 1000 MG: 500 TABLET, FILM COATED ORAL at 23:48

## 2019-12-03 RX ADMIN — Medication 2 G: at 11:21

## 2019-12-03 RX ADMIN — SODIUM CHLORIDE, SODIUM LACTATE, POTASSIUM CHLORIDE, AND CALCIUM CHLORIDE 1000 ML: 600; 310; 30; 20 INJECTION, SOLUTION INTRAVENOUS at 08:30

## 2019-12-03 RX ADMIN — MIDAZOLAM 2 MG: 1 INJECTION INTRAMUSCULAR; INTRAVENOUS at 10:16

## 2019-12-03 NOTE — OP NOTES
[facility]  Total Knee Arthroplasty  Patient:Александр Mariano   : 1965  Medical Record Number:[id number]      Pre-operative Diagnosis:  Unilateral primary osteoarthritis, left knee [M17.12]  Post-operative Diagnosis: Unilateral primary osteoarthritis, left knee [M17.12]  Location: [place] [address]    Date of Procedure: 12/3/2019  Surgeon: Rosina Nageotte, MD  Assistant: JARON Jama    Anesthesia: Spinal and nerve block  Modifier: 22   BMI:Body mass index is 36.64 kg/m². Procedure:  Left Posterior Stabilized Cementless Total Knee Arthroplasty     Tourniquet Time: none    EBL:  250 cc     The complexity of the total joint surgery requires the use of a first assistant for positioning, retraction and assistance in closure. This BMI for this patient is Body mass index is 36.64 kg/m². [place] BMI's between 30 and 38.9 are considered obese, while a BMI over 39 indicates the patient is morbidly obese. Obese and Morbidly obese patients make exposure and retraction extremely difficult. The patient's large size also makes implantation and proper insertion of total joint implants more difficult. Bentley Opitz was brought to the operating room, positioned on the operating room table, and after appropriate identification  was anethestized. A gannon catheter was placed preoperatively and IV antibiotics were administered, along with IV tranexamic acid. The limb was prepped and draped in the usual sterile fashion. Prior to the incision being made a timeout was called identifying the patient, procedure ,operative side and surgeon. An anterior longitudinal incision was accomplished just medial to the tibial tubercle and extending approximal 6 centimeters proximal to the superior pole of the patella. A medial parapatellar capsular incision was performed. The medial capsular flap was elevated around to the insertion of the semimembranous tendon.   The patella was everted and the knee flexed and externally rotated. The articular surface revealed cartilage loss, exposed bone and bone spurs involving the femoral and tibial surfaces. The medial and lateral menisci were excised. The lateral half of the fat pad excised and the patella femoral ligament was released. The anterior cruciate ligament and the posterior cruciate ligament were resected. Using extramedullary instrumentation, the tibial cut was accomplished with appropriate posterior slope. Approximately 6 mm of bone was removed from the high side of the tibia. The distal femur was addressed next. A drill hole was made above the intracondylar notch. Using appropriate intramedullary instrumentation, an appropriate valgus distal cut was accomplished. The femur was sized to a 5 component. The anterior and posterior cuts and anterior and posterior chamfer cuts were then made about the distal femur. Osteophytes were removed from the tibial and femoral surfaces. The appropriate cutting blocks was then utilized to perform the notch cut, with appropriate lateral translation accomplished for the patellofemoral groove. The tibia was sized to a 6 component. The tibial base plate was pinned into place with  appropriate external rotation and the stem site prepared. A preliminary range of motion was accomplished with the above size trial components. A 13 millimeter polyethylene insert allowed the patient to obtain full extension as well as appropriate flexion. Additional surgical releases were none. The patient's ligaments were stable in flexion and extension to medial and lateral stressing and alignment was through the appropriate mechanical axis. The patella was then everted. The patella demonstrated no significant wear and was not deemed appropriate for resurfacing. A trial reduction revealed appropriate tracking through the patellofemoral groove with no lateral retinacular release accomplished.     All trial components were removed and the surfaces were prepared for implantation with irrigation and debridement of the bone interstices. 10 cc of tranexamic acid was place in the femoral canal and the site sealed with a bone plug. The posterior capsule, periosteum and soft tissues were injected for postop pain management. The femoral and tibial components were pressurized in full extension as well as 70 degrees of flexion. A lavage of diluted betadine solution of 17.5 ml Betadine in 500 of 0.9% Normal Saline was allowed to soak in the wound for 3 minutes after implanting of the prosthesis. The wound was irrigated with Saline again before closure. Prior to the final skin closure, full strength betadine was applied to the skin surrounding the skin incision. After fascial closure a solution of 1 gram of TXA and 1 gram of Vancomycin powder was injected. Michae Flower knee was placed through a range of motion and noted to be stable as mentioned above with the trial components. The operative knee was injected prior to closure for post op pain management. The capsular layer was closed using a #1 PDS suture, while the subcutaneous layers were closed using a 2-0 Monocryl interrupted suture. The skin was closed using staples and a sterile bandage was applied. A cryo pad was applied on the operative leg. The sponge count and needle counts were correct.     Implants: * No implants in log *  Signed By: Kailey Cameron MD

## 2019-12-03 NOTE — PROGRESS NOTES
Problem: Self Care Deficits Care Plan (Adult)  Goal: *Acute Goals and Plan of Care (Insert Text)  Description  GOALS:   DISCHARGE GOALS (in preparation for going home/rehab):  3 days  1. Mr. Sagar Guzman will perform one lower body dressing activity with minimal assistance required to demonstrate improved functional mobility and safety. 2.  Mr. aSgar Guzman will perform one lower body bathing activity with minimal assistance required to demonstrate improved functional mobility and safety. 3.  Mr. Sagar Guzman will perform toileting/toilet transfer with contact guard assistance to demonstrate improved functional mobility and safety. 4.  Mr. Sagar Guzman will perform shower transfer with contact guard assistance to demonstrate improved functional mobility and safety. JOINT CAMP OCCUPATIONAL THERAPY TKA: Initial Assessment and Daily Note 12/3/2019  INPATIENT: Hospital Day: 1  Payor: [address] [place] [address] / Plan: [address] / Product Type: PPO /      NAME/AGE/GENDER: Bentley Opitz is a 53 y.o. male   PRIMARY DIAGNOSIS:  Unilateral primary osteoarthritis, left knee [M17.12]   Procedure(s) and Anesthesia Type:     * LEFT TOTAL KNEE ARTHROPLASTY CARY - Spinal (Left)  ICD-10: Treatment Diagnosis:    Pain in Left Knee (M25.562)  Stiffness of Left Knee, Not elsewhere classified (R71.309)      ASSESSMENT:     Mr. Sagar Guzman is s/p Left TKA and presents with decreased weight bearing on L LE and decreased independence with functional mobility and activities of daily living as compared to baseline level of function and safety. Patient would benefit from skilled Occupational Therapy to maximize independence and safety with self-care task and functional mobility. Pt would also benefit from education on adaptive equipment and safety precautions in preparation for going home with spouse. Patient able to don underwear at edge of bed with assist. Mobilized from bed to recliner using a rolling walker.  Should progress well with ADL's tomorrow. This section established at most recent assessment   PROBLEM LIST (Impairments causing functional limitations):  Decreased Strength  Decreased ADL/Functional Activities  Decreased Transfer Abilities  Increased Pain  Increased Fatigue  Decreased Flexibility/Joint Mobility  Decreased Knowledge of Precautions   INTERVENTIONS PLANNED: (Benefits and precautions of occupational therapy have been discussed with the patient.)  Activities of daily living training  Adaptive equipment training  Balance training  Clothing management  Donning&doffing training  Theraputic activity     TREATMENT PLAN: Frequency/Duration: Follow patient 1-2tx to address above goals. Rehabilitation Potential For Stated Goals: Excellent     RECOMMENDED REHABILITATION/EQUIPMENT: (at time of discharge pending progress): Continue Skilled Therapy. OCCUPATIONAL PROFILE AND HISTORY:   History of Present Injury/Illness (Reason for Referral): Pt presents this date s/p (Left) TKA. Past Medical History/Comorbidities:   Mr. Rosa Quiros  has a past medical history of Arrhythmia (2009-- per pt caused by dilaudid), Arthritis, Diabetes mellitus, type 2 ([address] Elevated cholesterol, GERD (gastroesophageal reflux disease), History of kidney stones (2002), Hypertension, Morbid obesity ([address] PUD (peptic ulcer disease) (2003), Sleep apnea, Testosterone deficiency, and Vitamin D deficiency (06/11/2014). Mr. Rosa Quiros  has a past surgical history that includes hx colonoscopy (07/17/2015); pr neurological procedure unlisted (3/2011 at [facility]); hx orthopaedic; hx heent (2009); hx gi (early 1990s); and hx knee replacement (Right, 08/27/2019).   Social History/Living Environment:   Home Environment: Private residence  # Steps to Enter: 3  One/Two Story Residence: One story  Living Alone: No  Support Systems: Spouse/Significant Other/Partner  Patient Expects to be Discharged to[de-identified] Private residence  Current DME Used/Available at Home: [employer] Gardner rolling  Prior Level of Function/Work/Activity:  Independent prior. Number of Personal Factors/Comorbidities that affect the Plan of Care: Brief history (0):  LOW COMPLEXITY   ASSESSMENT OF OCCUPATIONAL PERFORMANCE[de-identified]   Most Recent Physical Functioning:   Balance  Sitting: Intact  Standing: With support                    Coordination  Fine Motor Skills-Upper: Left Intact; Right Intact  Gross Motor Skills-Upper: Left Intact; Right Intact         Mental Status  Neurologic State: Alert  Orientation Level: Oriented X4  Cognition: Appropriate decision making  Perception: Appears intact                Basic ADLs (From Assessment) Complex ADLs (From Assessment)   Basic ADL  Feeding: Independent  Oral Facial Hygiene/Grooming: Setup  Bathing: Minimum assistance  Upper Body Dressing: Setup  Lower Body Dressing: Moderate assistance  Toileting: Moderate assistance     Grooming/Bathing/Dressing Activities of Daily Living                       Functional Transfers  Bathroom Mobility: Minimum assistance  Toilet Transfer : Minimum assistance  Shower Transfer: Moderate assistance     Bed/Mat Mobility  Supine to Sit: Minimum assistance  Sit to Stand: Minimum assistance  Stand to Sit: Minimum assistance  Bed to Chair: Minimum assistance  Scooting: Contact guard assistance         Physical Skills Involved:  Range of Motion  Balance  Strength Cognitive Skills Affected (resulting in the inability to perform in a timely and safe manner):  [facility]  Psychosocial Skills Affected:  WFL    Number of elements that affect the Plan of Care: 1-3:  LOW COMPLEXITY   CLINICAL DECISION MAKIN [facility] [address] [address] AM-PAC 6 Clicks   Daily Activity Inpatient Short Form  How much help from another person does the patient currently need. .. Total A Lot A Little None   1. Putting on and taking off regular lower body clothing? [] 1   [x] 2   [] 3   [] 4   2. Bathing (including washing, rinsing, drying)? [] 1   [x] 2   [] 3   [] 4   3.   Toileting, which includes using toilet, bedpan or urinal?   [] 1   [x] 2   [] 3   [] 4   4. Putting on and taking off regular upper body clothing? [] 1   [] 2   [] 3   [x] 4   5. Taking care of personal grooming such as brushing teeth? [] 1   [] 2   [] 3   [x] 4   6. Eating meals? [] 1   [] 2   [] 3   [x] 4   © 2007, Trustees of [address] [place] [address] [address] [address], under license to [employer]. All rights reserved     Score:  Initial: 18 Most Recent: X (Date: -- )    Interpretation of Tool:  Represents activities that are increasingly more difficult (i.e. Bed mobility, Transfers, Gait). Medical Necessity:     Skilled intervention continues to be required due to Deficits lsited abvoe. Reason for Services/Other Comments:  Patient continues to require skilled intervention due to   New TKAm   . Use of outcome tool(s) and clinical judgement create a POC that gives a: MODERATE COMPLEXITY            TREATMENT:   (In addition to Assessment/Re-Assessment sessions the following treatments were rendered)     Pre-treatment Symptoms/Complaints:    Pain: Initial:   Pain Intensity 1: 3  Post Session:  3     Self Care: (10): Procedure(s) (per grid) utilized to improve and/or restore self-care/home management as related to dressing, bathing, and toileting. Required minimal verbal and tactile cueing to facilitate activities of daily living skills. Initial evaluation 5 minutes. Treatment/Session Assessment:     Response to Treatment:  Good, sitting up in recliner.     Education:  [] Home Exercises  [x] Fall Precautions  [] Hip Precautions [] Going Home Video  [x] Knee/Hip Prosthesis Review  [x] Walker Management/Safety [x] Adaptive Equipment as Needed       Interdisciplinary Collaboration:   Physical Therapist  Occupational Therapist  Registered Nurse    After treatment position/precautions:   Up in chair  Bed/Chair-wheels locked  Caregiver at bedside  Call light within reach  RN notified     Compliance with Program/Exercises: Compliant all of the time, Will assess as treatment progresses. Recommendations/Intent for next treatment session:  Treatment next visit will focus on increasing Mr. Bourgeois's independence with bed mobility, transfers, self care, functional mobility, modalities for pain, and patient education.       Total Treatment Duration:  OT Patient Time In/Time Out  Time In: 1540  Time Out: [address] [place], [place]

## 2019-12-03 NOTE — ANESTHESIA PROCEDURE NOTES
Peripheral Block    Start time: 12/3/2019 10:16 AM  End time: 12/3/2019 10:21 AM  Performed by: Mortimer Bugler, MD  Authorized by: Mortimer Bugler, MD       Pre-procedure: Indications: at surgeon's request and post-op pain management    Preanesthetic Checklist: patient identified, risks and benefits discussed, site marked, timeout performed, anesthesia consent given and patient being monitored    Timeout Time: 10:16          Block Type:   Block Type:   Adductor canal  Laterality:  Left  Monitoring:  Continuous pulse ox, frequent vital sign checks, heart rate, oxygen and responsive to questions  Injection Technique:  Single shot  Procedures: ultrasound guided    Patient Position: supine  Prep: chlorhexidine    Location:  Mid thigh  Needle Gauge:  20 G  Needle Localization:  Ultrasound guidance    Assessment:  Number of attempts:  1  Injection Assessment:  Incremental injection every 5 mL, local visualized surrounding nerve on ultrasound, negative aspiration for blood, no intravascular symptoms, no paresthesia and ultrasound image on chart  Patient tolerance:  Patient tolerated the procedure well with no immediate complications

## 2019-12-03 NOTE — PROGRESS NOTES
12/03/19 1646   Oxygen Therapy   O2 Sat (%) 94 %   Pulse via Oximetry 100 beats per minute   O2 Device Room air   O2 Flow Rate (L/min) 0 l/min   Patient achieved     2250   Ml/sec on IS. Patient encouraged to do 10 breaths every hour while awake-patient agreed and demonstrated. No shortness of breath or distress noted. BS are clear b/l. [facility] notes reviewed- Sat monitor placed at bedside.

## 2019-12-03 NOTE — PROGRESS NOTES
Care Management Interventions  PCP Verified by CM: Yes  Transition of Care Consult (CM Consult): Discharge Planning, [address]: Yes  Physical Therapy Consult: Yes  Occupational Therapy Consult: Yes  Current Support Network: Lives with Spouse, Own Home  Confirm Follow Up Transport: Family  Plan discussed with Pt/Family/Caregiver: Yes  Freedom of Choice Offered: Yes  Discharge Location  Discharge Placement: Home with home health      SW spoke with pt to confirm d/c plans after his L TKA. Spouse at bedside. Pt had R TKA in Aug.  Pt plans to d/c home with [facility] and Family support. Pt provided list of [facility] agencies. & prefers [facility]. Pt has all needed DME. SW made referral to [facility].

## 2019-12-03 NOTE — ANESTHESIA PREPROCEDURE EVALUATION
Anesthetic History               Review of Systems / Medical History  Patient summary reviewed and pertinent labs reviewed    Pulmonary        Sleep apnea (s/p UPPP. Denies issues currently)           Neuro/Psych   Within defined limits        Pertinent negatives: No CVA   Cardiovascular    Hypertension: well controlled            Pertinent negatives: No past MI and anginaDysrhythmias: Pt states he experienced post op A. fib after UPPP but none since.   Exercise tolerance: >4 METS  Comments: Denies CP, SOB or changes in functional status   GI/Hepatic/Renal     GERD: well controlled    Renal disease: stones  PUD     Endo/Other    Diabetes: type 2    Morbid obesity and arthritis     Other Findings   Comments: Former            Physical Exam    Airway  Mallampati: III  TM Distance: 4 - 6 cm  Neck ROM: decreased range of motion   Mouth opening: Normal     Cardiovascular    Rhythm: regular  Rate: normal      Pertinent negatives: No murmur   Dental  No notable dental hx       Pulmonary  Breath sounds clear to auscultation               Abdominal  GI exam deferred       Other Findings            Anesthetic Plan    ASA: 3  Anesthesia type: spinal      Post-op pain plan if not by surgeon: peripheral nerve block single    Induction: Intravenous  Anesthetic plan and risks discussed with: Patient

## 2019-12-03 NOTE — PERIOP NOTES
TRANSFER - IN REPORT:    Verbal report received from [facility]) on Timur Course  being received from [facility](unit) for routine progression of care      Report consisted of patients Situation, Background, Assessment and   Recommendations(SBAR). Information from the following report(s) SBAR, Kardex and MAR was reviewed with the receiving nurse. Opportunity for questions and clarification was provided. Assessment completed upon patients arrival to unit and care assumed.

## 2019-12-03 NOTE — PROGRESS NOTES
TRANSFER - OUT REPORT:    Verbal report given to ZO Christie on Rebeca Cardenas  being transferred to [facility] for routine post - op       Report consisted of patients Situation, Background, Assessment and   Recommendations(SBAR). Information from the following report(s) SBAR, Kardex, OR Summary, Intake/Output and MAR was reviewed with the receiving nurse. Lines:   Peripheral IV 12/03/19 Left Arm (Active)   Site Assessment Clean, dry, & intact 12/3/2019  2:20 PM   Phlebitis Assessment 0 12/3/2019  2:20 PM   Infiltration Assessment 0 12/3/2019  2:20 PM   Dressing Status Clean, dry, & intact 12/3/2019  2:20 PM   Dressing Type Transparent;Tape 12/3/2019  2:20 PM   Hub Color/Line Status Green; Infusing 12/3/2019  2:20 PM        Opportunity for questions and clarification was provided.       Patient transported with:   [employer]

## 2019-12-03 NOTE — ANESTHESIA PROCEDURE NOTES
Spinal Block    Start time: 12/3/2019 11:27 AM  End time: 12/3/2019 11:38 AM  Performed by: Jonah Mathew MD  Authorized by: Jonah Mathew MD     Pre-procedure:   Indications: primary anesthetic  Preanesthetic Checklist: patient identified, risks and benefits discussed, anesthesia consent, patient being monitored and timeout performed    Timeout Time: 11:27          Spinal Block:   Patient Position:  Seated  Prep Region:  Lumbar  Prep: chlorhexidine and patient draped      Location:  L3-4  Technique:  Single shot    Local Dose (mL):  3    Needle:   Needle Type:  Pencan  Needle Gauge:  25 G  Attempts:  1      Events: CSF confirmed, no blood with aspiration and no paresthesia        Assessment:  Insertion:  Uncomplicated  Patient tolerance:  Patient tolerated the procedure well with no immediate complications

## 2019-12-03 NOTE — PERIOP NOTES
TRANSFER - OUT REPORT:    Verbal report given to Deniz PATHAK on Marvel Boggs  being transferred to [facility] for routine progression of care       Report consisted of patients Situation, Background, Assessment and   Recommendations(SBAR). Information from the following report(s) SBAR, MAR and Recent Results was reviewed with the receiving nurse. Lines:   Peripheral IV 12/03/19 Left Arm (Active)   Site Assessment Clean, dry, & intact 12/3/2019  8:24 AM   Phlebitis Assessment 0 12/3/2019  8:24 AM   Infiltration Assessment 0 12/3/2019  8:24 AM   Dressing Status Clean, dry, & intact 12/3/2019  8:24 AM   Dressing Type Tape;Transparent 12/3/2019  8:24 AM   Hub Color/Line Status Green; Infusing 12/3/2019  8:24 AM        Opportunity for questions and clarification was provided.       Patient transported with:   [employer]

## 2019-12-03 NOTE — PROGRESS NOTES
The Plan for Transition of Care is related to the following treatment goals: to increase mobility. The Patient was provided with a choice of provider and agrees   with the discharge plan. [x] Yes [] No    Freedom of choice list was provided with basic dialogue that supports the patient's individualized plan of care/goals, treatment preferences and shares the quality data associated with the providers.  [x] Yes [] No

## 2019-12-03 NOTE — ANESTHESIA POSTPROCEDURE EVALUATION
Procedure(s):  LEFT TOTAL KNEE ARTHROPLASTY CARY.    spinal    Anesthesia Post Evaluation      Multimodal analgesia: multimodal analgesia used between 6 hours prior to anesthesia start to PACU discharge  Patient location during evaluation: bedside  Patient participation: complete - patient participated  Level of consciousness: awake and responsive to light touch  Pain management: adequate  Airway patency: patent  Anesthetic complications: no  Cardiovascular status: acceptable, hemodynamically stable, blood pressure returned to baseline and stable  Respiratory status: acceptable, unassisted, spontaneous ventilation and nonlabored ventilation  Hydration status: acceptable  Post anesthesia nausea and vomiting:  controlled      Vitals Value Taken Time   /64 12/3/2019  1:25 PM   Temp 36.4 °C (97.6 °F) 12/3/2019  1:25 PM   Pulse 91 12/3/2019  1:25 PM   Resp 22 12/3/2019  1:25 PM   SpO2 90 % 12/3/2019  1:25 PM

## 2019-12-03 NOTE — H&P
[id number] [facility]  History and Physical Exam    Patient ID:  Lena Lantigua  [id number]    17 y.o.  1965    Today: December 3, 2019    Vitals Signs: Reviewed as noted in medical record. Allergies: Allergies   Allergen Reactions    Crestor [Rosuvastatin] Myalgia     Pt still takes this Med    Dilaudid [Hydromorphone (Bulk)] Other (comments)     Caused pt to go into A FIB for short time    Tylox [Oxycodone-Acetaminophen] Other (comments)     \"aggitation\"        CC: Left knee pain    HPI:  Pt complains of left knee pain with difficulty ambulating. Relevant PMH:   Past Medical History:   Diagnosis Date    Arrhythmia 2009-- per pt caused by dilaudid    Pt went into An arrythmia after surgery,states caused by pain meds. does not see cardiologist.   Leyla Downy Arthritis     knees, neck    Diabetes mellitus, type 2 (HCC)     Oral meds, Average BS- 155-165, Last A1C 7.5 on 6/20/19, denies hypoglycemia    Elevated cholesterol     GERD (gastroesophageal reflux disease)     well controlled by prilosec on a prn basis    History of kidney stones 2002    only x 1    Hypertension     controlled by medication    Morbid obesity ([facility] [address]     bmi=38    PUD (peptic ulcer disease) 2003    Sleep apnea     C-pap nightly     Testosterone deficiency     controlled by medication    Vitamin D deficiency 06/11/2014    [facility] (Level 18)       Objective:                    HEENT: NC/AT                   Lungs:  clear                   Heart:   rrr                   Abdomen: soft                   Extremities:  Pain with rom of the left knee joint    Radiographs: reveal osteoarthritis with loss of joint space and bone spurs. Assessment: Unilateral primary osteoarthritis, left knee [M17.12]    Plan:  Proceed with scheduled Procedure(s) (LRB):  LEFT TOTAL KNEE ARTHROPLASTY CARY (Left) . The patient has failed conservative treatment including NSAIDS, and injections.   Due to the amount of pain the patient is experiencing we will proceed with scheduled procedure. It is also felt that the patient is high risk for postoperative complications due to history of multiple chronic medical problems.   Patient may potentially spend 2 nights in the hospital.       Signed By: JARON Hodge  December 3, 2019

## 2019-12-03 NOTE — PROGRESS NOTES
[address]  Face to Face Encounter    Patients Name: Joshua Hall    YOB: 1965    Ordering Physician:   Keya Rubalcava    Primary Diagnosis: Unilateral primary osteoarthritis, left knee [M17.12]  Arthritis of left knee [M17.12]    Date of Face to Face:   12/3/2019                                  Face to Face Encounter findings are related to primary reason for home care:   yes. 1. I certify that the patient needs intermittent care as follows: physical therapy: strengthening and gait/stair training    2. I certify that this patient is homebound, that is: 1) patient requires the use of a walker device, special transportation, or assistance of another to leave the home; or 2) patient's condition makes leaving the home medically contraindicated; and 3) patient has a normal inability to leave the home and leaving the home requires considerable and taxing effort. Patient may leave the home for infrequent and short duration for medical reasons, and occasional absences for non-medical reasons. Homebound status is due to the following functional limitations: Patient currently under activity restrictions secondary to recent surgical procedure, this hinders their ability to safely leave the home. 3. I certify that this patient is under my care and that I, or a nurse practitioner or  [id number], or clinical nurse specialist, or certified nurse midwife, working with me, had a Face-to-Face Encounter that meets the physician Face-to-Face Encounter requirements.   The following are the clinical findings from the [address] [place] [address] encounter that support the need for skilled services and is a summary of the encounter:  See Progress Notes     See attached progess note      SCOTT Monk  12/3/2019      THE FOLLOWING TO BE COMPLETED BY THE COMMUNITY PHYSICIAN:    I concur with the findings described above from the [facility] encounter that this patient is homebound and in need of a skilled service.     Certifying Physician: _____________________________________      Printed Certifying Physician Name: _____________________________________    Date: _________________

## 2019-12-03 NOTE — PROGRESS NOTES
Problem: Mobility Impaired (Adult and Pediatric)  Goal: *Acute Goals and Plan of Care (Insert Text)  Description  GOALS (1-4 days):  (1.)Mr. Gama will move from supine to sit and sit to supine  in bed with SUPERVISION. (2.)Mr. Gama will transfer from bed to chair and chair to bed with SUPERVISION using the least restrictive device. (3.)Mr. Gama will ambulate with SUPERVISION for 300 feet with the least restrictive device. (4.)Mr. Gama will ambulate up/down 3 steps with bilateral  railing with CONTACT GUARD ASSIST with no device. (5.)Mr. Gama will increase left knee ROM to 5°-80°.  ________________________________________________________________________________________________     Outcome: Progressing Towards Goal     PHYSICAL THERAPY JOINT CAMP TKA: Initial Assessment, Treatment Day: Day of Assessment, and PM 12/3/2019  INPATIENT: Hospital Day: 1  Payor: [employer] / Plan: [address] [place] [address] Avenue / Product Type: PPO /      NAME/AGE/GENDER: Brendon Lee is a 53 y.o. male   PRIMARY DIAGNOSIS:  Unilateral primary osteoarthritis, left knee [M17.12]   Procedure(s) and Anesthesia Type:     * LEFT TOTAL KNEE ARTHROPLASTY CARY - Spinal (Left)  ICD-10: Treatment Diagnosis:    Pain in Left Knee (M25.562)  Stiffness of Left Knee, Not elsewhere classified (M25.662)  Difficulty in walking, Not elsewhere classified (R26.2)      ASSESSMENT:     Mr. Gama presents supine on contact with decreased strength and range of motion left lower extremity and with decreased independence with functional mobility s/p left TKA. Pt will benefit from skilled PT interventions to maximize independence with functional mobility and TKA management. He had his right knee done in august of this year and feels familiar with the protocol. He states he feels his nerve block has already worn off and he is in a lot of pain. He did get up and take a short walk in the room. We did not do exercises today, will start in the morning. Left up in chair with ice on knee and needs in reach. This section established at most recent assessment   PROBLEM LIST (Impairments causing functional limitations):  Decreased Strength  Decreased ADL/Functional Activities  Decreased Transfer Abilities  Decreased Ambulation Ability/Technique  Increased Pain  Decreased Flexibility/Joint Mobility  Edema/Girth  Decreased [place] with Home Exercise Program   INTERVENTIONS PLANNED: (Benefits and precautions of physical therapy have been discussed with the patient.)  Cold  bed mobility  gait training  home exercise program (HEP)  Range of Motion: active/assisted/passive  Therapeutic Activities  therapeutic exercise/strengthening  transfer training  Group Therapy     TREATMENT PLAN: Frequency/Duration: Follow patient BID for duration of hospital stay to address above goals. Rehabilitation Potential For Stated Goals: Good     RECOMMENDED REHABILITATION/EQUIPMENT: (at time of discharge pending progress): Continue Skilled Therapy, Home Health: Physical Therapy, and Outpatient: Physical Therapy. HISTORY:   History of Present Injury/Illness (Reason for Referral):  Pt s/p left TKA on 12/3/19  Past Medical History/Comorbidities:   Mr. Murali Real  has a past medical history of Arrhythmia (2009-- per pt caused by dilaudid), Arthritis, Diabetes mellitus, type 2 ([address] Elevated cholesterol, GERD (gastroesophageal reflux disease), History of kidney stones (2002), Hypertension, Morbid obesity ([address] PUD (peptic ulcer disease) (2003), Sleep apnea, Testosterone deficiency, and Vitamin D deficiency (06/11/2014). Mr. Murali Real  has a past surgical history that includes hx colonoscopy (07/17/2015); pr neurological procedure unlisted (3/2011 at [facility]); hx orthopaedic; hx heent (2009); hx gi (early 1990s); and hx knee replacement (Right, 08/27/2019).   Social History/Living Environment:   Home Environment: Private residence  # Steps to Enter: 3  One/Two Story Residence: One story  Living Alone: No  Support Systems: Spouse/Significant Other/Partner  Patient Expects to be Discharged to[de-identified] Private residence  Current DME Used/Available at Home: Cane, straight, Grab bars, Walker, rolling(high commode)  Tub or Shower Type: Tub/Shower combination  Prior Level of Function/Work/Activity:  Pt living at home, independent with gait and ADLs without assistive devices   Number of Personal Factors/Comorbidities that affect the Plan of Care: 0: LOW COMPLEXITY   EXAMINATION:   Most Recent Physical Functioning:   Gross Assessment: Yes  Gross Assessment  AROM: Within functional limits(except left lower extremity s/p TKA)  Strength: Within functional limits(except left lower extremity s/p TKA)                     Bed Mobility  Supine to Sit: Minimum assistance  Scooting: Contact guard assistance    Transfers  Sit to Stand: Minimum assistance  Stand to Sit: Minimum assistance  Bed to Chair: Minimum assistance    Balance  Sitting: Intact  Standing: With support    Posture  Posture (WDL): Within defined limits         Weight Bearing Status  Left Side Weight Bearing: As tolerated  Distance (ft): 15 Feet (ft)  Ambulation - Level of Assistance: Minimal assistance  Assistive Device: Walker, rolling  Speed/Lluvia: Pace decreased (<100 feet/min)  Step Length: Right shortened  Stance: Left decreased  Gait Abnormalities: Antalgic;Decreased step clearance        Braces/Orthotics: none    Left Knee Cold  Type: Cryocuff      Body Structures Involved:  Joints  Muscles Body Functions Affected: Movement Related Activities and Participation Affected: Mobility  Self Care   Number of elements that affect the Plan of Care: 4+: HIGH COMPLEXITY   CLINICAL PRESENTATION:   Presentation: Stable and uncomplicated: LOW COMPLEXITY   CLINICAL DECISION MAKIN [facility] [address] [id number] AM-PAC 6 Clicks   Basic Mobility Inpatient Short Form  How much difficulty does the patient currently have. .. Unable A Lot A Little None   1. Turning over in bed (including adjusting bedclothes, sheets and blankets)? [] 1   [] 2   [x] 3   [] 4   2. Sitting down on and standing up from a chair with arms ( e.g., wheelchair, bedside commode, etc.)   [] 1   [] 2   [x] 3   [] 4   3. Moving from lying on back to sitting on the side of the bed? [] 1   [] 2   [x] 3   [] 4   How much help from another person does the patient currently need. .. Total A Lot A Little None   4. Moving to and from a bed to a chair (including a wheelchair)? [] 1   [] 2   [x] 3   [] 4   5. Need to walk in hospital room? [] 1   [] 2   [x] 3   [] 4   6. Climbing 3-5 steps with a railing? [] 1   [] 2   [x] 3   [] 4   © 2007, [employer], under license to [employer]. All rights reserved     Score:  Initial: 18 Most Recent: X (Date: -- )    Interpretation of Tool:  Represents activities that are increasingly more difficult (i.e. Bed mobility, Transfers, Gait). Medical Necessity:     Patient is expected to demonstrate progress in   strength, range of motion, and functional technique   to   decrease assistance required with functional mobility and TKA exercises independently   . Reason for Services/Other Comments:  Patient continues to require skilled intervention due to   Inability to complete functional mobility and TKA exercises independently   .    Use of outcome tool(s) and clinical judgement create a POC that gives a: Clear prediction of patient's progress: LOW COMPLEXITY            TREATMENT:   (In addition to Assessment/Re-Assessment sessions the following treatments were rendered)     Pre-treatment Symptoms/Complaints:  none  Pain Initial: having pain, did not rate     Post Session:  still with pain, ice on knee     Assessment/Reassessment only, no treatment provided today    Date:   Date:   Date:     ACTIVITY/EXERCISE AM PM AM PM AM PM   GROUP THERAPY  []  []  []  []  []  []   Ankle Pumps         Quad Sets         Gluteal Sets         Hip ABd/ADduction         Straight Leg Raises         Knee Slides         Short Arc Quads         Long Arc Quads         Chair Slides                  B = bilateral; AA = active assistive; A = active; P = passive      Treatment/Session Assessment:     Response to Treatment:  increased pain but pt felt a little better after standing on his new knee. Education:  [] Home Exercises  [x] Fall Precautions  []  [] D/C Instruction Review  [] Knee Prosthesis Review  [] Walker Management/Safety [] Adaptive Equipment as Needed       Interdisciplinary Collaboration:   Occupational Therapist  Registered Nurse    After treatment position/precautions:   Up in chair  Bed/Chair-wheels locked  Call light within reach  Family at bedside    Compliance with Program/Exercises: Compliant all of the time, Will assess as treatment progresses. Recommendations/Intent for next treatment session:  Treatment next visit will focus on increasing Mr. Bourgeois's independence with bed mobility, transfers, gait training, strength/ROM exercises, modalities for pain, and patient education.       Total Treatment Duration:  PT Patient Time In/Time Out  Time In: 1600  Time Out: 1110 Lonnie Weiss, PT

## 2019-12-03 NOTE — PROGRESS NOTES
TRANSFER - IN REPORT:    Verbal report received from [facility]) on Rebeca Cardenas  being received from PACU(unit) for routine progression of care      Report consisted of patients Situation, Background, Assessment and   Recommendations(SBAR). Information from the following report(s) Kardex, Procedure Summary, Intake/Output, MAR and Recent Results was reviewed with the receiving nurse. Opportunity for questions and clarification was provided. Assessment completed upon patients arrival to unit and care assumed.

## 2019-12-04 VITALS
HEIGHT: 69 IN | TEMPERATURE: 98.1 F | DIASTOLIC BLOOD PRESSURE: 60 MMHG | BODY MASS INDEX: 36.75 KG/M2 | RESPIRATION RATE: 16 BRPM | SYSTOLIC BLOOD PRESSURE: 100 MMHG | HEART RATE: 99 BPM | OXYGEN SATURATION: 94 % | WEIGHT: 248.1 LBS

## 2019-12-04 LAB — HGB BLD-MCNC: 10.9 G/DL (ref 13.6–17.2)

## 2019-12-04 PROCEDURE — 97116 GAIT TRAINING THERAPY: CPT

## 2019-12-04 PROCEDURE — 74011250637 HC RX REV CODE- 250/637: Performed by: PHYSICIAN ASSISTANT

## 2019-12-04 PROCEDURE — 74011250636 HC RX REV CODE- 250/636: Performed by: PHYSICIAN ASSISTANT

## 2019-12-04 PROCEDURE — 97150 GROUP THERAPEUTIC PROCEDURES: CPT

## 2019-12-04 PROCEDURE — 74011250637 HC RX REV CODE- 250/637: Performed by: ORTHOPAEDIC SURGERY

## 2019-12-04 PROCEDURE — 97110 THERAPEUTIC EXERCISES: CPT

## 2019-12-04 PROCEDURE — 94760 N-INVAS EAR/PLS OXIMETRY 1: CPT

## 2019-12-04 PROCEDURE — 97535 SELF CARE MNGMENT TRAINING: CPT

## 2019-12-04 PROCEDURE — 36415 COLL VENOUS BLD VENIPUNCTURE: CPT

## 2019-12-04 PROCEDURE — 85018 HEMOGLOBIN: CPT

## 2019-12-04 RX ORDER — ASPIRIN 81 MG/1
81 TABLET ORAL EVERY 12 HOURS
Qty: 60 TAB | Refills: 0 | Status: SHIPPED | OUTPATIENT
Start: 2019-12-04

## 2019-12-04 RX ORDER — OXYCODONE HYDROCHLORIDE 10 MG/1
10 TABLET ORAL
Qty: 50 TAB | Refills: 0 | Status: SHIPPED | OUTPATIENT
Start: 2019-12-04 | End: 2019-12-07

## 2019-12-04 RX ADMIN — Medication 10 ML: at 05:39

## 2019-12-04 RX ADMIN — ASPIRIN 81 MG: 81 TABLET, COATED ORAL at 08:45

## 2019-12-04 RX ADMIN — MORPHINE SULFATE 6 MG: 10 INJECTION INTRAVENOUS at 05:38

## 2019-12-04 RX ADMIN — OXYCODONE HYDROCHLORIDE 10 MG: 5 TABLET ORAL at 12:27

## 2019-12-04 RX ADMIN — OXYCODONE HYDROCHLORIDE 10 MG: 5 TABLET ORAL at 00:25

## 2019-12-04 RX ADMIN — CELECOXIB 200 MG: 200 CAPSULE ORAL at 08:46

## 2019-12-04 RX ADMIN — ACETAMINOPHEN 1000 MG: 500 TABLET, FILM COATED ORAL at 12:27

## 2019-12-04 RX ADMIN — OXYCODONE HYDROCHLORIDE 10 MG: 5 TABLET ORAL at 08:53

## 2019-12-04 RX ADMIN — PROPRANOLOL HYDROCHLORIDE 80 MG: 40 TABLET ORAL at 08:46

## 2019-12-04 RX ADMIN — SENNOSIDES AND DOCUSATE SODIUM 2 TABLET: 8.6; 5 TABLET ORAL at 08:46

## 2019-12-04 RX ADMIN — Medication 10 ML: at 05:32

## 2019-12-04 RX ADMIN — ACETAMINOPHEN 1000 MG: 500 TABLET, FILM COATED ORAL at 05:32

## 2019-12-04 RX ADMIN — Medication 1 AMPULE: at 08:46

## 2019-12-04 RX ADMIN — METFORMIN HYDROCHLORIDE: 500 TABLET ORAL at 08:45

## 2019-12-04 RX ADMIN — Medication 2 G: at 05:32

## 2019-12-04 NOTE — PROGRESS NOTES
Pt assessment unchanged. All goals met. Discharge summery and home medication sheet reviewed and signed by pt. Copy given for take home use. Rx for po oxycodone and asa given to pt. Pt left hospital via w/c with family and staff member.

## 2019-12-04 NOTE — PROGRESS NOTES
Problem: Mobility Impaired (Adult and Pediatric)  Goal: *Acute Goals and Plan of Care (Insert Text)  Description  GOALS (1-4 days):  (1.)Mr. Hilary Eng will move from supine to sit and sit to supine  in bed with SUPERVISION. (2.)Mr. Hilary Eng will transfer from bed to chair and chair to bed with SUPERVISION using the least restrictive device. (3.)Mr. Hilary Eng will ambulate with SUPERVISION for 300 feet with the least restrictive device. (4.)Mr. Hilary Eng will ambulate up/down 3 steps with bilateral  railing with CONTACT GUARD ASSIST with no device. (5.)Mr. Hilary Eng will increase left knee ROM to 5°-80°.  ________________________________________________________________________________________________     Outcome: Progressing Towards Goal     PHYSICAL THERAPY JOINT CAMP TKA: Daily Note, Treatment Day: 1st and AM 12/4/2019  INPATIENT: Hospital Day: 2  Payor: [address] [place] [address] / Plan: [address] / Product Type: PPO /      NAME/AGE/GENDER: Timur Aly is a 53 y.o. male   PRIMARY DIAGNOSIS:  Unilateral primary osteoarthritis, left knee [M17.12]   Procedure(s) and Anesthesia Type:     * LEFT TOTAL KNEE ARTHROPLASTY CARY - Spinal (Left)  ICD-10: Treatment Diagnosis:    · Pain in Left Knee (M25.562)  · Stiffness of Left Knee, Not elsewhere classified (M25.662)  · Difficulty in walking, Not elsewhere classified (R26.2)      ASSESSMENT:     Mr. Hilary Eng presents supine on contact with decreased strength and range of motion left lower extremity and with decreased independence with functional mobility s/p left TKA. Pt will benefit from skilled PT interventions to maximize independence with functional mobility and TKA management. He had his right knee done in august of this year and feels familiar with the protocol. 12/4/19 AM:  Pt sitting up in chair on arrival. He is agreeable to PT and plans to go home today with HHPT. He is doing well with gait and exercises.  He ambulated in the hallway with SBA/CGA with verbal cues for gait sequence. Pt returned to room and left supine in bed with his wife present. This section established at most recent assessment   PROBLEM LIST (Impairments causing functional limitations):  1. Decreased Strength  2. Decreased ADL/Functional Activities  3. Decreased Transfer Abilities  4. Decreased Ambulation Ability/Technique  5. Increased Pain  6. Decreased Flexibility/Joint Mobility  7. Edema/Girth  8. Decreased [place] with Home Exercise Program   INTERVENTIONS PLANNED: (Benefits and precautions of physical therapy have been discussed with the patient.)  1. Cold  2. bed mobility  3. gait training  4. home exercise program (HEP)  5. Range of Motion: active/assisted/passive  6. Therapeutic Activities  7. therapeutic exercise/strengthening  8. transfer training  9. Group Therapy     TREATMENT PLAN: Frequency/Duration: Follow patient BID for duration of hospital stay to address above goals. Rehabilitation Potential For Stated Goals: Good     RECOMMENDED REHABILITATION/EQUIPMENT: (at time of discharge pending progress): Continue Skilled Therapy, Home Health: Physical Therapy, and Outpatient: Physical Therapy. HISTORY:   History of Present Injury/Illness (Reason for Referral):  Pt s/p left TKA on 12/3/19  Past Medical History/Comorbidities:   Mr. Raine Hines  has a past medical history of Arrhythmia (2009-- per pt caused by dilaudid), Arthritis, Diabetes mellitus, type 2 ([address] Elevated cholesterol, GERD (gastroesophageal reflux disease), History of kidney stones (2002), Hypertension, Morbid obesity ([address] PUD (peptic ulcer disease) (2003), Sleep apnea, Testosterone deficiency, and Vitamin D deficiency (06/11/2014). Mr. Raine Hines  has a past surgical history that includes hx colonoscopy (07/17/2015); pr neurological procedure unlisted (3/2011 at [facility]); hx orthopaedic; hx heent (2009); hx gi (early 1990s); and hx knee replacement (Right, 08/27/2019).   Social History/Living Environment: Home Environment: Private residence  # Steps to Enter: 3  One/Two Story Residence: One story  Living Alone: No  Support Systems: Spouse/Significant Other/Partner  Patient Expects to be Discharged to[de-identified] Private residence  Current DME Used/Available at Home: [address], [place], straight, Grab bars, Walker, rolling(high commode)  Tub or Shower Type: Tub/Shower combination  Prior Level of Function/Work/Activity:  Pt living at home, independent with gait and ADLs without assistive devices   Number of Personal Factors/Comorbidities that affect the Plan of Care: 0: LOW COMPLEXITY   EXAMINATION:   Most Recent Physical Functioning:                            Bed Mobility  Supine to Sit: Stand-by assistance    Transfers  Sit to Stand: Contact guard assistance  Stand to Sit: Contact guard assistance  Bed to Chair: Contact guard assistance    Balance  Sitting: Intact  Standing: With support              Weight Bearing Status  Left Side Weight Bearing: As tolerated  Distance (ft): 120 Feet (ft)  Ambulation - Level of Assistance: Stand-by assistance;Contact guard assistance  Assistive Device: Walker, rolling  Speed/Lluvia: Delayed;Pace decreased (<100 feet/min)  Step Length: Left shortened;Right shortened  Stance: Left decreased  Gait Abnormalities: Antalgic;Decreased step clearance        Braces/Orthotics: none    Left Knee Cold  Type: Cryocuff      Body Structures Involved:  1. Joints  2. Muscles Body Functions Affected:  1. Movement Related Activities and Participation Affected:  1. Mobility  2. Self Care   Number of elements that affect the Plan of Care: 4+: HIGH COMPLEXITY   CLINICAL PRESENTATION:   Presentation: Stable and uncomplicated: LOW COMPLEXITY   CLINICAL DECISION MAKIN \[facility]\"" [address] [address] AM-PAC 6 Clicks   Basic Mobility Inpatient Short Form  How much difficulty does the patient currently have. .. Unable A Lot A Little None   1. Turning over in bed (including adjusting bedclothes, sheets and blankets)?    [] 1   [] 2   [x] 3   [] 4   2. Sitting down on and standing up from a chair with arms ( e.g., wheelchair, bedside commode, etc.)   [] 1   [] 2   [x] 3   [] 4   3. Moving from lying on back to sitting on the side of the bed? [] 1   [] 2   [x] 3   [] 4   How much help from another person does the patient currently need. .. Total A Lot A Little None   4. Moving to and from a bed to a chair (including a wheelchair)? [] 1   [] 2   [x] 3   [] 4   5. Need to walk in hospital room? [] 1   [] 2   [x] 3   [] 4   6. Climbing 3-5 steps with a railing? [] 1   [] 2   [x] 3   [] 4   © 2007, Trustees of [address] [place] [address] [address] [address], under license to [employer]. All rights reserved     Score:  Initial: 18 Most Recent: X (Date: -- )    Interpretation of Tool:  Represents activities that are increasingly more difficult (i.e. Bed mobility, Transfers, Gait). Medical Necessity:     · Patient is expected to demonstrate progress in   · strength, range of motion, and functional technique  ·  to   · decrease assistance required with functional mobility and TKA exercises independently   · .  Reason for Services/Other Comments:  · Patient continues to require skilled intervention due to   · Inability to complete functional mobility and TKA exercises independently   · . Use of outcome tool(s) and clinical judgement create a POC that gives a: Clear prediction of patient's progress: LOW COMPLEXITY            TREATMENT:   (In addition to Assessment/Re-Assessment sessions the following treatments were rendered)     Pre-treatment Symptoms/Complaints:  Left knee pain  Pain Initial: some tightness      Post Session:  Not rated     Gait Training (15 Minutes):  Gait training to improve and/or restore physical functioning as related to mobility, strength and balance.   Ambulated 120 Feet (ft) with Stand-by assistance;Contact guard assistance using a Walker, rolling and minimal   related to their stance phase and stride length to promote proper body alignment, promote proper body posture and promote proper body mechanics. Therapeutic Exercise: (10 Minutes):  Exercises per grid below to improve mobility, strength and balance. Required minimal verbal cues to promote proper body alignment, promote proper body posture and promote proper body mechanics. Progressed repetitions as indicated. Date:  12/4/19 Date:   Date:     ACTIVITY/EXERCISE AM PM AM PM AM PM   GROUP THERAPY  []  []  []  []  []  []   Ankle Pumps 10        Quad Sets 10        Gluteal Sets 10        Hip ABd/ADduction 10        Straight Leg Raises 10        Knee Slides 10        Short Arc Quads         Long Arc Quads         Chair Slides                  B = bilateral; AA = active assistive; A = active; P = passive      Treatment/Session Assessment:     Response to Treatment:  Pt doing better with gait and exercises. Education:  [] Home Exercises  [x] Fall Precautions  []  [] D/C Instruction Review  [] Knee Prosthesis Review  [] Walker Management/Safety [] Adaptive Equipment as Needed       Interdisciplinary Collaboration:   o Physical Therapist  o Registered Nurse    After treatment position/precautions:   o Supine in bed  o Bed/Chair-wheels locked  o Bed in low position  o Caregiver at bedside  o Call light within reach    Compliance with Program/Exercises: Compliant all of the time, Will assess as treatment progresses. Recommendations/Intent for next treatment session:  Treatment next visit will focus on increasing Mr. Bourgeois's independence with bed mobility, transfers, gait training, strength/ROM exercises, modalities for pain, and patient education.       Total Treatment Duration:  PT Patient Time In/Time Out  Time In: 0935  Time Out: [facility]

## 2019-12-04 NOTE — PROGRESS NOTES
Shift assessment completed. Pt is alert & oriented x4. Able to verbalize needs. Resting quietly with no distress noted. Dressing to left knee is clean, dry and intact. Yudith Flank in place. Neurovascular and peripheral vascular checks WNL. Incentive Spirometry at bedside. Patient encouraged to use hourly x 10 repetitions. Patient voiding clear yellow urine without difficulty. Pain is being managed with Oxycodone with patient tolerating well. Bed low and locked. Call light within reach. Patient instructed to call for assistance. Pt verbalizes understanding. Will monitor.

## 2019-12-04 NOTE — PROGRESS NOTES
Patient is wearing 3L Nc in place of home cpap. Continuous sat #6 placed on patient. Monitor history was cleared and alarms are set per protocol.

## 2019-12-04 NOTE — PROGRESS NOTES
2019         Post Op day: 1 Day Post-Op   Admit Diagnosis: Unilateral primary osteoarthritis, left knee [M17.12]  Arthritis of left knee [M17.12]  LAB:    Recent Results (from the past 24 hour(s))   GLUCOSE, POC    Collection Time: 19  8:49 AM   Result Value Ref Range    Glucose (POC) 136 (H) 65 - 100 mg/dL   HEMOGLOBIN    Collection Time: 19  7:47 PM   Result Value Ref Range    HGB 11.3 (L) 13.6 - 17.2 g/dL   HEMOGLOBIN    Collection Time: 19  3:54 AM   Result Value Ref Range    HGB 10.9 (L) 13.6 - 17.2 g/dL     Vital Signs:    Patient Vitals for the past 8 hrs:   BP Temp Pulse Resp SpO2   19 0641 93/54 97.6 °F (36.4 °C) 99 16 97 %   19 0323 114/63 97.6 °F (36.4 °C) (!) 101 16 97 %     Temp (24hrs), Av.6 °F (36.4 °C), Min:97.4 °F (36.3 °C), Max:98 °F (36.7 °C)    Pain Control:   Pain Assessment  Pain Scale 1: Numeric (0 - 10)  Pain Intensity 1: 7  Pain Location 1: Knee  Pain Orientation 1: Left  Pain Description 1: Constant  Pain Intervention(s) 1: Medication (see MAR)  Subjective: Doing well, pain is well controlled, no complaints     Objective:  No Acute Distress, Alert and Oriented, Neurovascular exam is normal       Assessment:   Patient Active Problem List   Diagnosis Code    Vitamin D deficiency E55.9    Testosterone deficiency E34.9    Sleep apnea G47.30    Arthritis M19.90    Arrhythmia I49.9    Chronic kidney disease N18.9    GERD (gastroesophageal reflux disease) K21.9    Elevated cholesterol E78.00    Hypertension I10    Calculus of kidney N20.0    Morbid obesity ([address] E66.01    Type 2 diabetes mellitus with hyperglycemia, without long-term current use of insulin (HCC) E11.65    Snoring R06.83    Arthritis of right knee M17.11    Arthritis of left knee M17.12       Status Post Procedure(s) (LRB):  LEFT TOTAL KNEE ARTHROPLASTY CARY (Left)        Plan: Continue Physical Therapy, Monitor Hgb. ASA/SCDs for DVT prophylaxis.   Anticipate d/c to home today.   Patient seen by Dr. Mendoza Grow this AM.   Signed By: JARON Hernandez

## 2019-12-04 NOTE — DISCHARGE INSTRUCTIONS
[id number] [facility]   Patient Discharge Instructions    Joshua Hall / [id number] : 1965    Admitted 12/3/2019 Discharged: 2019     IF YOU HAVE ANY PROBLEMS ONCE YOU ARE AT HOME CALL THE FOLLOWING NUMBERS:   Main office number: [phone]    Take Home Medications     · It is important that you take the medication exactly as they are prescribed. · Keep your medication in the bottles provided by the pharmacist and keep a list of the medication names, dosages, and times to be taken in your wallet. · Do not take other medications without consulting your doctor. What to do at [address] your prehospital diet. If you have excessive nausea or vomitting call your doctor's office     Home Physical Therapy is arranged. Use rolling walker when walking. Patients who have had a joint replacement should not drive until you are seen for your follow up appointment by Dr. Kyler Charles. When to Call    - Call if you have a temperature greater then 101  - Unable to keep food down  - Loose control of your bladder or bowel function  - Are unable to bear any weight   - Need a pain medication refill       DISCHARGE SUMMARY from Nurse    The following personal items collected during your admission are returned to you:   Dental Appliance: Dental Appliances: None  Vision: Visual Aid: Magnifying glass  Hearing Aid:    Jewelry: Jewelry: None  Clothing:    Other Valuables: Other Valuables: Cell Phone(with Isela)  Valuables sent to safe:      PATIENT INSTRUCTIONS:    After general anesthesia or intravenous sedation, for 24 hours or while taking prescription Narcotics:  · Limit your activities  · Do not drive and operate hazardous machinery  · Do not make important personal or business decisions  · Do  not drink alcoholic beverages  · If you have not urinated within 8 hours after discharge, please contact your surgeon on call.     Report the following to your surgeon:  · Excessive pain, swelling, redness or odor of or around the surgical area  · Temperature over 101  · Nausea and vomiting lasting longer than 4 hours or if unable to take medications  · Any signs of decreased circulation or nerve impairment to extremity: change in color, persistent  numbness, tingling, coldness or increase pain  · Any questions, call office @ [phone]      Keep scheduled follow up appointment. If need to change, call office @ [phone]. *  Please give a list of your current medications to your Primary Care Provider. *  Please update this list whenever your medications are discontinued, doses are      changed, or new medications (including over-the-counter products) are added. *  Please carry medication information at all times in case of emergency situations. Patient Education        Total Knee Replacement: What to Expect at Home  Your Recovery    When you leave the hospital, you should be able to move around with a walker or crutches. But you will need someone to help you at home for the next few weeks or until you have more energy and can move around better. If you need more extensive rehab, you may go to a specialized rehab center for more treatment. You will go home with a bandage and stitches, staples, tissue glue, or tape strips. Change the bandage as your doctor tells you to. If you have stitches or staples, your doctor will remove them 10 to 21 days after your surgery. Glue or tape strips will fall off on their own over time. You may still have some mild pain, and the area may be swollen for 3 to 6 months after surgery. Your knee will continue to improve for 6 to 12 months. You will probably use a walker for 1 to 3 weeks and then use crutches. When you are ready, you can use a cane. You will probably be able to walk on your own in 4 to 8 weeks. You will need to do months of physical rehabilitation (rehab) after a knee replacement.  Rehab will help you strengthen the muscles of the knee and help you regain movement. After you recover, your artificial knee will allow you to do normal daily activities with less pain or no pain at all. You may be able to hike, dance, ride a bike, and play golf. Talk to your doctor about whether you can do more strenuous activities. Always tell your caregivers that you have an artificial knee. How long it will take to walk on your own, return to normal activities, and go back to work depends on your health and how well your rehabilitation (rehab) program goes. The better you do with your rehab exercises, the quicker you will get your strength and movement back. This care sheet gives you a general idea about how long it will take for you to recover. But each person recovers at a different pace. Follow the steps below to get better as quickly as possible. How can you care for yourself at home? Activity    · Rest when you feel tired. You may take a nap, but do not stay in bed all day. When you sit, use a chair with arms. You can use the arms to help you stand up.     · Work with your physical therapist to find the best way to exercise. What you can do as your knee heals will depend on whether your new knee is cemented or uncemented. You may not be able to do certain things for a while if your new knee is uncemented.     · After your knee has healed enough, you can do more strenuous activities with caution. ? You can golf, but use a golf cart, and do not wear shoes with spikes. ? You can bike on a flat road or on a stationary bike. Avoid biking up hills. ? Your doctor may suggest that you stay away from activities that put stress on your knee. These include tennis or badminton, squash or racquetball, contact sports like football, jumping (such as in basketball), jogging, or running. ? Avoid activities where you might fall. These include horseback riding, skiing, and mountain biking.     · Do not sit for more than 1 hour at a time.  Get up and walk around for a while before you sit again. If you must sit for a long time, prop up your leg with a chair or footstool. This will help you avoid swelling.     · Ask your doctor when you can drive again. It may take up to 8 weeks after knee replacement surgery before it is safe for you to drive.     · When you get into a car, sit on the edge of the seat. Then pull in your legs, and turn to face the front.     · You should be able to do many everyday activities 3 to 6 weeks after your surgery. You will probably need to take 4 to 16 weeks off from work. When you can go back to work depends on the type of work you do and how you feel.     · Ask your doctor when it is okay for you to have sex.     · Do not lift anything heavier than 10 pounds and do not lift weights for 12 weeks. Diet    · By the time you leave the hospital, you should be eating your normal diet. If your stomach is upset, try bland, low-fat foods like plain rice, broiled chicken, toast, and yogurt. Your doctor may suggest that you take iron and vitamin supplements.     · Drink plenty of fluids (unless your doctor tells you not to).   · Eat healthy foods, and watch your portion sizes. Try to stay at your ideal weight. Too much weight puts more stress on your new knee.     · You may notice that your bowel movements are not regular right after your surgery. This is common. Try to avoid constipation and straining with bowel movements. You may want to take a fiber supplement every day. If you have not had a bowel movement after a couple of days, ask your doctor about taking a mild laxative. Medicines    · Your doctor will tell you if and when you can restart your medicines. He or she will also give you instructions about taking any new medicines.     · If you take blood thinners, such as warfarin (Coumadin), clopidogrel (Plavix), or aspirin, be sure to talk to your doctor. He or she will tell you if and when to start taking those medicines again.  Make sure that you understand exactly what your doctor wants you to do.     · Your doctor may give you a blood-thinning medicine to prevent blood clots. If you take a blood thinner, be sure you get instructions about how to take your medicine safely. Blood thinners can cause serious bleeding problems. This medicine could be in pill form or as a shot (injection). If a shot is necessary, your doctor will tell you how to do this.     · Be safe with medicines. Take pain medicines exactly as directed. ? If the doctor gave you a prescription medicine for pain, take it as prescribed. ? If you are not taking a prescription pain medicine, ask your doctor if you can take an over-the-counter medicine. ? Plan to take your pain medicine 30 minutes before exercises. It is easier to prevent pain before it starts than to stop it once it has started.     · If you think your pain medicine is making you sick to your stomach:  ? Take your medicine after meals (unless your doctor has told you not to). ? Ask your doctor for a different pain medicine.     · If your doctor prescribed antibiotics, take them as directed. Do not stop taking them just because you feel better. You need to take the full course of antibiotics. Incision care    · If your doctor told you how to care for your cut (incision), follow your doctor's instructions. You will have a dressing over the cut. A dressing helps the incision heal and protects it. Your doctor will tell you how to take care of this.     · If you did not get instructions, follow this general advice:  ? If you have strips of tape on the cut the doctor made, leave the tape on for a week or until it falls off.  ? If you have stitches or staples, your doctor will tell you when to come back to have them removed. ? If you have skin adhesive on the cut, leave it on until it falls off. Skin adhesive is also called glue or liquid stitches. ? Change the bandage every day. ? Wash the area daily with warm water, and pat it dry.  Don't use hydrogen peroxide or alcohol. They can slow healing. ? You may cover the area with a gauze bandage if it oozes fluid or rubs against clothing. ? You may shower 24 to 48 hours after surgery. Pat the incision dry. Don't swim or take a bath for the first 2 weeks, or until your doctor tells you it is okay. Exercise    · Your rehab program will give you a number of exercises to do to help you get back your knee's range of motion and strength. Always do them as your therapist tells you. Ice and elevation    · For pain and swelling, put ice or a cold pack on the area for 10 to 20 minutes at a time. Put a thin cloth between the ice and your skin. Other instructions    · Continue to wear your support stockings as your doctor says. These help to prevent blood clots. The length of time that you will have to wear them depends on your activity level and the amount of swelling.     · You have metal pieces in your knee. These may set off some airport metal detectors. Carry a medical alert card that says you have an artificial joint, just in case. Follow-up care is a key part of your treatment and safety. Be sure to make and go to all appointments, and call your doctor if you are having problems. It's also a good idea to know your test results and keep a list of the medicines you take. When should you call for help? Call 911 anytime you think you may need emergency care. For example, call if:    · You passed out (lost consciousness).     · You have severe trouble breathing.     · You have sudden chest pain and shortness of breath, or you cough up blood.    Call your doctor now or seek immediate medical care if:    · You have signs of infection, such as:  ? Increased pain, swelling, warmth, or redness. ? Red streaks leading from the incision. ? Pus draining from the incision. ? A fever.     · You have signs of a blood clot, such as:  ? Pain in your calf, back of the knee, thigh, or groin. ?  Redness and swelling in your leg or groin.     · Your incision comes open and begins to bleed, or the bleeding increases.     · You have pain that does not get better after you take pain medicine.    Watch closely for changes in your health, and be sure to contact your doctor if:    · You do not have a bowel movement after taking a laxative. Where can you learn more? Go to http://eder-isabelle.info/. Enter K122 in the search box to learn more about \"Total Knee Replacement: What to Expect at Home. \"  Current as of: June 26, 2019  Content Version: 12.2  © 1493-4709 [employer]. Care instructions adapted under license by [employer] (which disclaims liability or warranty for this information). If you have questions about a medical condition or this instruction, always ask your healthcare professional. [address] any warranty or liability for your use of this information. These are general instructions for a healthy lifestyle:    No smoking/ No tobacco products/ Avoid exposure to second hand smoke    Surgeon General's Warning:  Quitting smoking now greatly reduces serious risk to your health. Obesity, smoking, and sedentary lifestyle greatly increases your risk for illness    A healthy diet, regular physical exercise & weight monitoring are important for maintaining a healthy lifestyle    You may be retaining fluid if you have a history of heart failure or if you experience any of the following symptoms:  Weight gain of 3 pounds or more overnight or 5 pounds in a week, increased swelling in our hands or feet or shortness of breath while lying flat in bed. Please call your doctor as soon as you notice any of these symptoms; do not wait until your next office visit.     Recognize signs and symptoms of STROKE:    F-face looks uneven    A-arms unable to move or move even    S-speech slurred or non-existent    T-time-call 911 as soon as signs and symptoms begin-DO NOT go       Back to bed or wait to see if you get better-TIME IS BRAIN. The discharge information has been reviewed with the patient. The patient verbalized understanding. Information obtained by :  I understand that if any problems occur once I am at home I am to contact my physician. I understand and acknowledge receipt of the instructions indicated above.                                                                                                                                            Physician's or R.N.'s Signature                                                                  Date/Time                                                                                                                                              Patient or Representative Signature                                                          Date/Time

## 2019-12-04 NOTE — PROGRESS NOTES
12/04/19 0841   Oxygen Therapy   O2 Sat (%) 96 %   Pulse via Oximetry 102 beats per minute   O2 Device Room air Constitutional: Well developed, well nourished. NAD. Good general hygiene  Head: Abrasion of the L forehead and temple with the soccer ball pattern. No facial bone tenderness.   Eyes: PERRLA. EOMI without discomfort. No nystagmus.   ENT: No nasal discharge. Mucous membranes moist.  Neck: Supple. Painless ROM.  Cardiovascular: Regular rhythm. Regular rate. Normal S1 and S2. No murmurs. 2+ pulses in all extremities.   Pulmonary: Normal respiratory rate and effort. Lungs clear to auscultation bilaterally. No wheezing, rales, or rhonchi. Bilateral, equal lung expansion.   Abdominal: Soft. Nondistended. Nontender. No rebound or guarding.   Extremities: Pelvis stable. No lower extremity edema. Symmetric calves.  Skin: No rashes.   Neuro: AAOx3. CN 2-12 intact. Strength 5/5 in all extremities. Normal gait. Normal sensation. Normal finger-to-nose. Normal rapid repeated movements. Normal heel-to-shin. No nystagmus. No drift. Romberg negative. No word slurring.   Psych: Normal mood. Normal affect.

## 2019-12-04 NOTE — PROGRESS NOTES
Problem: Self Care Deficits Care Plan (Adult)  Goal: *Acute Goals and Plan of Care (Insert Text)  Description  GOALS:   DISCHARGE GOALS (in preparation for going home/rehab):  3 days  1. Mr. Ayanna Hollingsworth will perform one lower body dressing activity with minimal assistance required to demonstrate improved functional mobility and safety. -GOAL MET 12/4/2019   2. Mr. Ayanna Hollingsworth will perform one lower body bathing activity with minimal assistance required to demonstrate improved functional mobility and safety. -GOAL MET 12/4/2019   3. Mr. Ayanna Hollingsworth will perform toileting/toilet transfer with contact guard assistance to demonstrate improved functional mobility and safety. -GOAL MET 12/4/2019   4. Mr. Ayanna Hollingsworth will perform shower transfer with contact guard assistance to demonstrate improved functional mobility and safety. -GOAL MET 12/4/2019        JOINT CAMP OCCUPATIONAL THERAPY TKA: Daily Note and Discharge 12/4/2019  INPATIENT: Hospital Day: 2  Payor: [address] [place] [address] / Plan: [address] / Product Type: PPO /      NAME/AGE/GENDER: Phil Brownlee is a 53 y.o. male   PRIMARY DIAGNOSIS:  Unilateral primary osteoarthritis, left knee [M17.12]   Procedure(s) and Anesthesia Type:     * LEFT TOTAL KNEE ARTHROPLASTY CARY - Spinal (Left)  ICD-10: Treatment Diagnosis:    · Pain in Left Knee (M25.562)  · Stiffness of Left Knee, Not elsewhere classified (X44.401)      ASSESSMENT:      Mr. Ayanna Hollingsworth is s/p Left TKA and presents with decreased weight bearing on l LE and decreased independence with functional mobility and activities of daily living. Patient completed shower and dressing as charter below in ADL grid and is ambulating with rolling walker and stand by assist.  Patient has met 4/4 goals and plans to return home with good family support. Family able to provide patient with appropriate level of assistance at this time. OT reviewed safety precautions throughout session and therapy schedule for the remainder of today. Will do well at home for self cares and transfers during ADL's.  D/C OT for acute deficits. This section established at most recent assessment   PROBLEM LIST (Impairments causing functional limitations):  1. Decreased Strength  2. Decreased ADL/Functional Activities  3. Decreased Transfer Abilities  4. Increased Pain  5. Increased Fatigue  6. Decreased Flexibility/Joint Mobility  7. Decreased Knowledge of Precautions   INTERVENTIONS PLANNED: (Benefits and precautions of occupational therapy have been discussed with the patient.)  1. Activities of daily living training  2. Adaptive equipment training  3. Balance training  4. Clothing management  5. Donning&doffing training  6. Theraputic activity     TREATMENT PLAN: Frequency/Duration: Follow patient 1-2tx to address above goals. Rehabilitation Potential For Stated Goals: Excellent     RECOMMENDED REHABILITATION/EQUIPMENT: (at time of discharge pending progress): Continue Skilled Therapy. OCCUPATIONAL PROFILE AND HISTORY:   History of Present Injury/Illness (Reason for Referral): Pt presents this date s/p (Left) TKA. Past Medical History/Comorbidities:   Mr. Daniele Panchal  has a past medical history of Arrhythmia (2009-- per pt caused by dilaudid), Arthritis, Diabetes mellitus, type 2 ([address] Elevated cholesterol, GERD (gastroesophageal reflux disease), History of kidney stones (2002), Hypertension, Morbid obesity ([address] PUD (peptic ulcer disease) (2003), Sleep apnea, Testosterone deficiency, and Vitamin D deficiency (06/11/2014). Mr. Daniele Panchal  has a past surgical history that includes hx colonoscopy (07/17/2015); pr neurological procedure unlisted (3/2011 at [facility]); hx orthopaedic; hx heent (2009); hx gi (early 1990s); and hx knee replacement (Right, 08/27/2019).   Social History/Living Environment:   Home Environment: Private residence  # Steps to Enter: 3  One/Two Story Residence: One story  Living Alone: No  Support Systems: Spouse/Significant Other/Partner  Patient Expects to be Discharged to[de-identified] Private residence  Current DME Used/Available at Home: Lemon Dougherty, straight, Grab bars, Walker, rolling(high commode)  Tub or Shower Type: Tub/Shower combination  Prior Level of Function/Work/Activity:  Independent prior. Number of Personal Factors/Comorbidities that affect the Plan of Care: Brief history (0):  LOW COMPLEXITY   ASSESSMENT OF OCCUPATIONAL PERFORMANCE[de-identified]   Most Recent Physical Functioning:   Balance  Sitting: Intact  Standing: With support                    Coordination  Fine Motor Skills-Upper: Left Intact; Right Intact  Gross Motor Skills-Upper: Left Intact; Right Intact         Mental Status  Neurologic State: Alert  Orientation Level: Oriented X4  Cognition: Appropriate decision making  Perception: Appears intact                Basic ADLs (From Assessment) Complex ADLs (From Assessment)   Basic ADL  Feeding: Independent  Oral Facial Hygiene/Grooming: Setup  Bathing: Stand-by assistance  Type of Bath: Chlorhexidine (CHG), Full, Shower  Upper Body Dressing: Setup  Lower Body Dressing: Minimum assistance  Toileting: Stand by assistance     Grooming/Bathing/Dressing Activities of Daily Living   Grooming  Grooming Assistance: Set-up     Upper Body Bathing  Bathing Assistance: Set-up     Lower Body Bathing  Bathing Assistance: Stand-by assistance     Upper Body Dressing Assistance  Dressing Assistance: Set-up Functional Transfers  Bathroom Mobility: Stand-by assistance  Toilet Transfer : Stand-by assistance  Shower Transfer: Stand-by assistance   Lower Body Dressing Assistance  Dressing Assistance: Minimum assistance  Underpants: Stand-by assistance  Pants With Elastic Waist: Stand-by assistance  Socks: Minimum assistance Bed/Mat Mobility  Supine to Sit: Stand-by assistance  Sit to Stand: Stand-by assistance  Stand to Sit: Stand-by assistance  Bed to Chair: Stand-by assistance         Physical Skills Involved:  1.  Range of Motion  2. Balance  3. Strength Cognitive Skills Affected (resulting in the inability to perform in a timely and safe manner):  1. [facility]  Psychosocial Skills Affected:  1. WFL    Number of elements that affect the Plan of Care: 1-3:  LOW COMPLEXITY   CLINICAL DECISION MAKIN[address] [place] [address] AM-PAC 6 Clicks   Daily Activity Inpatient Short Form  How much help from another person does the patient currently need. .. Total A Lot A Little None   1. Putting on and taking off regular lower body clothing? [] 1   [] 2   [x] 3   [] 4   2. Bathing (including washing, rinsing, drying)? [] 1   [] 2   [x] 3   [] 4   3. Toileting, which includes using toilet, bedpan or urinal?   [] 1   [] 2   [x] 3   [] 4   4. Putting on and taking off regular upper body clothing? [] 1   [] 2   [] 3   [x] 4   5. Taking care of personal grooming such as brushing teeth? [] 1   [] 2   [] 3   [x] 4   6. Eating meals? [] 1   [] 2   [] 3   [x] 4   © , Trustees of [address] [place] [address], under license to [employer]. All rights reserved     Score:  Initial: 18 Most Recent: 21 (Date: 2019 )    Interpretation of Tool:  Represents activities that are increasingly more difficult (i.e. Bed mobility, Transfers, Gait). Use of outcome tool(s) and clinical judgement create a POC that gives a: MODERATE COMPLEXITY            TREATMENT:   (In addition to Assessment/Re-Assessment sessions the following treatments were rendered)     Pre-treatment Symptoms/Complaints:    Pain: Initial:   Pain Intensity 1: 5  Pain Location 1: Knee  Pain Orientation 1: Left  Post Session:  3     Self Care: (40): Procedure(s) (per grid) utilized to improve and/or restore self-care/home management as related to dressing, bathing, toileting and grooming. Required minimal verbal cueing to facilitate activities of daily living skills. Treatment/Session Assessment:     Response to Treatment:  Good, sitting up in recliner.     Education:  [] Home Exercises  [x] Fall Precautions  [] Hip Precautions [] Going Home Video  [x] Knee/Hip Prosthesis Review  [x] Walker Management/Safety [x] Adaptive Equipment as Needed       Interdisciplinary Collaboration:   o Physical Therapist  o Occupational Therapist  o Registered Nurse    After treatment position/precautions:   o Up in chair  o Bed/Chair-wheels locked  o Caregiver at bedside  o Call light within reach  o RN notified     Compliance with Program/Exercises: Compliant all of the time, Will assess as treatment progresses. Recommendations/Intent for next treatment session:  D/C OT for acute deficits.       Total Treatment Duration:  OT Patient Time In/Time Out  Time In: 0815  Time Out: [id number], OT

## 2019-12-04 NOTE — PROGRESS NOTES
Problem: Mobility Impaired (Adult and Pediatric)  Goal: *Acute Goals and Plan of Care (Insert Text)  Description  GOALS (1-4 days):  (1.)Mr. Matt Victoria will move from supine to sit and sit to supine  in bed with SUPERVISION. (2.)Mr. Matt Victoria will transfer from bed to chair and chair to bed with SUPERVISION using the least restrictive device. (3.)Mr. Matt Victoria will ambulate with SUPERVISION for 300 feet with the least restrictive device. (4.)Mr. Matt Victoria will ambulate up/down 3 steps with bilateral  railing with CONTACT GUARD ASSIST with no device. (5.)Mr. Matt Victoria will increase left knee ROM to 5°-80°.  ________________________________________________________________________________________________     Outcome: Progressing Towards Goal     PHYSICAL THERAPY JOINT CAMP TKA: Daily Note, Treatment Day: 1st and PM 12/4/2019  INPATIENT: Hospital Day: 2  Payor: [address] [place] [address] / Plan: [address] / Product Type: PPO /      NAME/AGE/GENDER: Shane Connor is a 53 y.o. male   PRIMARY DIAGNOSIS:  Unilateral primary osteoarthritis, left knee [M17.12]   Procedure(s) and Anesthesia Type:     * LEFT TOTAL KNEE ARTHROPLASTY CARY - Spinal (Left)  ICD-10: Treatment Diagnosis:    · Pain in Left Knee (M25.562)  · Stiffness of Left Knee, Not elsewhere classified (M25.662)  · Difficulty in walking, Not elsewhere classified (R26.2)      ASSESSMENT:     Mr. Matt Victoria presents supine on contact with decreased strength and range of motion left lower extremity and with decreased independence with functional mobility s/p left TKA. Pt will benefit from skilled PT interventions to maximize independence with functional mobility and TKA management. He had his right knee done in august of this year and feels familiar with the protocol. 12/4/19 PM:  Pt sitting up in chair on arrival. He is agreeable to PT and plans to go home today with HHPT. He is doing well with mobility and exercises.       This section established at most recent assessment PROBLEM LIST (Impairments causing functional limitations):  1. Decreased Strength  2. Decreased ADL/Functional Activities  3. Decreased Transfer Abilities  4. Decreased Ambulation Ability/Technique  5. Increased Pain  6. Decreased Flexibility/Joint Mobility  7. Edema/Girth  8. Decreased [place] with Home Exercise Program   INTERVENTIONS PLANNED: (Benefits and precautions of physical therapy have been discussed with the patient.)  1. Cold  2. bed mobility  3. gait training  4. home exercise program (HEP)  5. Range of Motion: active/assisted/passive  6. Therapeutic Activities  7. therapeutic exercise/strengthening  8. transfer training  9. Group Therapy     TREATMENT PLAN: Frequency/Duration: Follow patient BID for duration of hospital stay to address above goals. Rehabilitation Potential For Stated Goals: Good     RECOMMENDED REHABILITATION/EQUIPMENT: (at time of discharge pending progress): Continue Skilled Therapy, Home Health: Physical Therapy, and Outpatient: Physical Therapy. HISTORY:   History of Present Injury/Illness (Reason for Referral):  Pt s/p left TKA on 12/3/19  Past Medical History/Comorbidities:   Mr. Rosa Quiros  has a past medical history of Arrhythmia (2009-- per pt caused by dilaudid), Arthritis, Diabetes mellitus, type 2 ([address] Elevated cholesterol, GERD (gastroesophageal reflux disease), History of kidney stones (2002), Hypertension, Morbid obesity ([address] PUD (peptic ulcer disease) (2003), Sleep apnea, Testosterone deficiency, and Vitamin D deficiency (06/11/2014). Mr. Rosa Quiros  has a past surgical history that includes hx colonoscopy (07/17/2015); pr neurological procedure unlisted (3/2011 at [facility]); hx orthopaedic; hx heent (2009); hx gi (early 1990s); and hx knee replacement (Right, 08/27/2019).   Social History/Living Environment:   Home Environment: Private residence  # Steps to Enter: 3  One/Two Story Residence: One story  Living Alone: No  Support Systems: Spouse/Significant Other/Partner  Patient Expects to be Discharged to[de-identified] Private residence  Current DME Used/Available at Home: Victory Manuel, straight, Grab bars, Walker, rolling(high commode)  Tub or Shower Type: Tub/Shower combination  Prior Level of Function/Work/Activity:  Pt living at home, independent with gait and ADLs without assistive devices   Number of Personal Factors/Comorbidities that affect the Plan of Care: 0: LOW COMPLEXITY   EXAMINATION:   Most Recent Physical Functioning:                            Bed Mobility  Supine to Sit: Stand-by assistance    Transfers  Sit to Stand: Stand-by assistance  Stand to Sit: Stand-by assistance  Bed to Chair: Stand-by assistance    Balance  Sitting: Intact  Standing: With support              Weight Bearing Status  Left Side Weight Bearing: As tolerated  Distance (ft): 284 Feet (ft)(x2)  Ambulation - Level of Assistance: Stand-by assistance  Assistive Device: Walker, rolling  Speed/Lluvia: Delayed;Pace decreased (<100 feet/min)  Step Length: Left shortened;Right shortened  Stance: Left decreased  Gait Abnormalities: Antalgic;Decreased step clearance        Braces/Orthotics: none    Left Knee Cold  Type: Cryocuff      Body Structures Involved:  1. Joints  2. Muscles Body Functions Affected:  1. Movement Related Activities and Participation Affected:  1. Mobility  2. Self Care   Number of elements that affect the Plan of Care: 4+: HIGH COMPLEXITY   CLINICAL PRESENTATION:   Presentation: Stable and uncomplicated: LOW COMPLEXITY   CLINICAL DECISION MAKIN [facility] [address] [address] AM-PAC 6 Clicks   Basic Mobility Inpatient Short Form  How much difficulty does the patient currently have. .. Unable A Lot A Little None   1. Turning over in bed (including adjusting bedclothes, sheets and blankets)? [] 1   [] 2   [x] 3   [] 4   2. Sitting down on and standing up from a chair with arms ( e.g., wheelchair, bedside commode, etc.)   [] 1   [] 2   [x] 3   [] 4   3.   Moving from lying on back to sitting on the side of the bed? [] 1   [] 2   [x] 3   [] 4   How much help from another person does the patient currently need. .. Total A Lot A Little None   4. Moving to and from a bed to a chair (including a wheelchair)? [] 1   [] 2   [x] 3   [] 4   5. Need to walk in hospital room? [] 1   [] 2   [x] 3   [] 4   6. Climbing 3-5 steps with a railing? [] 1   [] 2   [x] 3   [] 4   © 2007, Trustees of [address] [place] [id number], under license to [employer]. All rights reserved     Score:  Initial: 18 Most Recent: X (Date: -- )    Interpretation of Tool:  Represents activities that are increasingly more difficult (i.e. Bed mobility, Transfers, Gait). Medical Necessity:     · Patient is expected to demonstrate progress in   · strength, range of motion, and functional technique  ·  to   · decrease assistance required with functional mobility and TKA exercises independently   · .  Reason for Services/Other Comments:  · Patient continues to require skilled intervention due to   · Inability to complete functional mobility and TKA exercises independently   · . Use of outcome tool(s) and clinical judgement create a POC that gives a: Clear prediction of patient's progress: LOW COMPLEXITY            TREATMENT:   (In addition to Assessment/Re-Assessment sessions the following treatments were rendered)     Pre-treatment Symptoms/Complaints:  Left knee pain  Pain Initial: some tightness      Post Session:  Not rated     Gait Training (15 Minutes):  Gait training to improve and/or restore physical functioning as related to mobility, strength and balance. Ambulated 284 Feet (ft)(x2) with Stand-by assistance using a Walker, rolling and minimal   related to their stance phase and stride length to promote proper body alignment, promote proper body posture and promote proper body mechanics. Therapeutic Exercise: (45 Minutes(group)):  Exercises per grid below to improve mobility, strength and balance.   Required minimal verbal cues to promote proper body alignment, promote proper body posture and promote proper body mechanics. Progressed repetitions as indicated. Date:  12/4/19 Date:   Date:     ACTIVITY/EXERCISE AM PM AM PM AM PM   GROUP THERAPY  []  [x]  []  []  []  []   Ankle Pumps 10 15       Quad Sets 10 15       Gluteal Sets 10 15       Hip ABd/ADduction 10 15       Straight Leg Raises 10 15       Knee Slides 10 15       Short Arc Quads  15       Long Arc Quads         Chair Slides  15                B = bilateral; AA = active assistive; A = active; P = passive      Treatment/Session Assessment:     Response to Treatment:  Pt doing better with gait and exercises. Education:  [] Home Exercises  [x] Fall Precautions  []  [] D/C Instruction Review  [x] Knee Prosthesis Review  [x] Walker Management/Safety [] Adaptive Equipment as Needed       Interdisciplinary Collaboration:   o Physical Therapist  o Registered Nurse  o Rehabilitation Attendant    After treatment position/precautions:   o Up in chair  o Bed/Chair-wheels locked  o Bed in low position  o Caregiver at bedside  o Call light within reach    Compliance with Program/Exercises: Compliant all of the time, Will assess as treatment progresses. Recommendations/Intent for next treatment session:  Treatment next visit will focus on increasing Mr. Leijas independence with bed mobility, transfers, gait training, strength/ROM exercises, modalities for pain, and patient education.       Total Treatment Duration:  PT Patient Time In/Time Out  Time In: 1300  Time Out: 1400 [facility]

## 2019-12-05 ENCOUNTER — HOME CARE VISIT (OUTPATIENT)
Dept: SCHEDULING | Facility: HOME HEALTH | Age: 54
End: 2019-12-05
Payer: COMMERCIAL

## 2019-12-05 VITALS
SYSTOLIC BLOOD PRESSURE: 130 MMHG | TEMPERATURE: 98 F | HEART RATE: 92 BPM | RESPIRATION RATE: 18 BRPM | DIASTOLIC BLOOD PRESSURE: 82 MMHG

## 2019-12-05 PROCEDURE — G0151 HHCP-SERV OF PT,EA 15 MIN: HCPCS

## 2019-12-05 PROCEDURE — 400013 HH SOC

## 2019-12-09 ENCOUNTER — HOME CARE VISIT (OUTPATIENT)
Dept: SCHEDULING | Facility: HOME HEALTH | Age: 54
End: 2019-12-09
Payer: COMMERCIAL

## 2019-12-09 VITALS
SYSTOLIC BLOOD PRESSURE: 140 MMHG | TEMPERATURE: 98.8 F | RESPIRATION RATE: 19 BRPM | HEART RATE: 80 BPM | DIASTOLIC BLOOD PRESSURE: 98 MMHG

## 2019-12-09 PROCEDURE — G0157 HHC PT ASSISTANT EA 15: HCPCS

## 2019-12-11 ENCOUNTER — HOME CARE VISIT (OUTPATIENT)
Dept: SCHEDULING | Facility: HOME HEALTH | Age: 54
End: 2019-12-11
Payer: COMMERCIAL

## 2019-12-11 PROCEDURE — G0157 HHC PT ASSISTANT EA 15: HCPCS

## 2019-12-12 VITALS
SYSTOLIC BLOOD PRESSURE: 122 MMHG | TEMPERATURE: 98 F | HEART RATE: 98 BPM | RESPIRATION RATE: 19 BRPM | DIASTOLIC BLOOD PRESSURE: 90 MMHG

## 2019-12-13 ENCOUNTER — HOME CARE VISIT (OUTPATIENT)
Dept: SCHEDULING | Facility: HOME HEALTH | Age: 54
End: 2019-12-13
Payer: COMMERCIAL

## 2019-12-13 PROCEDURE — G0157 HHC PT ASSISTANT EA 15: HCPCS

## 2019-12-15 VITALS
SYSTOLIC BLOOD PRESSURE: 122 MMHG | HEART RATE: 86 BPM | TEMPERATURE: 97.9 F | DIASTOLIC BLOOD PRESSURE: 76 MMHG | RESPIRATION RATE: 19 BRPM

## 2019-12-16 ENCOUNTER — HOME CARE VISIT (OUTPATIENT)
Dept: SCHEDULING | Facility: HOME HEALTH | Age: 54
End: 2019-12-16
Payer: COMMERCIAL

## 2019-12-16 VITALS
DIASTOLIC BLOOD PRESSURE: 86 MMHG | TEMPERATURE: 97.9 F | HEART RATE: 100 BPM | SYSTOLIC BLOOD PRESSURE: 128 MMHG | RESPIRATION RATE: 18 BRPM

## 2019-12-16 PROCEDURE — G0157 HHC PT ASSISTANT EA 15: HCPCS

## 2019-12-17 ENCOUNTER — HOME CARE VISIT (OUTPATIENT)
Dept: HOME HEALTH SERVICES | Facility: HOME HEALTH | Age: 54
End: 2019-12-17
Payer: COMMERCIAL

## 2019-12-17 ENCOUNTER — HOME CARE VISIT (OUTPATIENT)
Dept: SCHEDULING | Facility: HOME HEALTH | Age: 54
End: 2019-12-17
Payer: COMMERCIAL

## 2019-12-17 VITALS
RESPIRATION RATE: 18 BRPM | SYSTOLIC BLOOD PRESSURE: 130 MMHG | DIASTOLIC BLOOD PRESSURE: 90 MMHG | TEMPERATURE: 98.2 F | HEART RATE: 101 BPM

## 2019-12-17 PROCEDURE — G0157 HHC PT ASSISTANT EA 15: HCPCS

## 2019-12-19 ENCOUNTER — HOME CARE VISIT (OUTPATIENT)
Dept: SCHEDULING | Facility: HOME HEALTH | Age: 54
End: 2019-12-19
Payer: COMMERCIAL

## 2019-12-19 VITALS
TEMPERATURE: 97.9 F | RESPIRATION RATE: 18 BRPM | SYSTOLIC BLOOD PRESSURE: 122 MMHG | DIASTOLIC BLOOD PRESSURE: 80 MMHG | HEART RATE: 86 BPM

## 2019-12-19 PROCEDURE — G0151 HHCP-SERV OF PT,EA 15 MIN: HCPCS

## 2019-12-23 ENCOUNTER — HOSPITAL ENCOUNTER (OUTPATIENT)
Dept: PHYSICAL THERAPY | Age: 54
Discharge: HOME | End: 2019-12-23
Payer: COMMERCIAL

## 2019-12-23 PROCEDURE — 97161 PT EVAL LOW COMPLEX 20 MIN: CPT

## 2019-12-23 PROCEDURE — 97110 THERAPEUTIC EXERCISES: CPT

## 2019-12-23 PROCEDURE — 97140 MANUAL THERAPY 1/> REGIONS: CPT

## 2019-12-23 NOTE — THERAPY EVALUATION
Heath Henriquez  : 1965  [facility]  Secondary:  [address] at [address]  [phone]   [phone]        OUTPATIENT PHYSICAL THERAPY:Initial Assessment 2019   ICD-10: Treatment Diagnosis: Left knee pain (25.562); Muscle weakness, generalized (M62.81)  Precautions/Allergies:   Crestor [rosuvastatin]; Dilaudid [hydromorphone (bulk)]; and Tylox [oxycodone-acetaminophen]   TREATMENT PLAN:  Effective Dates: 2019 TO 2020 (60 days). Frequency/Duration: 1-2 times a week for 60 Day(s) MEDICAL/REFERRING DIAGNOSIS:  Unilateral primary osteoarthritis, left knee [M17.12]   DATE OF ONSET: 12/3/19  REFERRING PHYSICIAN: Moses Benitez MD Orders: Renée Mckee and Treat  Return MD Appointment: Early 2020     INITIAL ASSESSMENT:  Mr. Denise Lyles presents to physical therapy today with complaints of L knee pain after undergoing a TKA on 12/3/19. He exhibits L knee swelling, a decrease in knee P/AROM, decrease in quad output and abnormal gait pattern that limits him from standing, walking and sitting for long periods of time. He will benefit from skilled therapy to address these limitations to allow him to return to functional activities such as walking, stair climbing and squatting. PROBLEM LIST (Impacting functional limitations):  1. Decreased Strength  2. Decreased ADL/Functional Activities  3. Decreased Transfer Abilities  4. Decreased Ambulation Ability/Technique  5. Decreased Balance  6. Increased Pain  7. Decreased Activity Tolerance  8. Decreased Flexibility/Joint Mobility  9. Edema/Girth  10. Decreased Knowledge of Precautions  11. Decreased [place] with Home Exercise Program INTERVENTIONS PLANNED: (Treatment may consist of any combination of the following)  1. Balance Exercise  2. Cold  3. Electrical Stimulation  4. Gait Training  5. Home Exercise Program (HEP)  6.  Manual Therapy  7. Neuromuscular Re-education/Strengthening  8. Range of Motion (ROM)  9. Therapeutic Activites  10. Therapeutic Exercise/Strengthening     GOALS: (Goals have been discussed and agreed upon with patient.)  Discharge Goals: Time Frame: 12/23/19 - 2/21/20  1. Patient will be independent with home exercise program without assistance from therapist.   2. Patient will report 60/80 LEFS score to demonstrate improved functional capacity. 3. Patient will demonstrate +4 degrees of active knee extension to normalize his gait without an assistive device. 4. Patient will perform 10 sit to stands without UE support to demonstrate improved functional mobility. 5. Patient will demonstrate 125 degrees of passive knee flexion to allow him to return to light squatting activities around the house. OUTCOME MEASURE:   Tool Used: Lower Extremity Functional Scale (LEFS)  Score:  Initial: 24/80 (12/23/19) Most Recent: X/80 (Date: -- )   Interpretation of Score: 20 questions each scored on a 5 point scale with 0 representing \"extreme difficulty or unable to perform\" and 4 representing \"no difficulty\". The lower the score, the greater the functional disability. 80/80 represents no disability. Minimal detectable change is 9 points. MEDICAL NECESSITY:   · Patient is expected to demonstrate progress in strength, range of motion, balance and coordination to increase independence with standing, walking and negotiating stairs. REASON FOR SERVICES/OTHER COMMENTS:  · Patient continues to require present interventions due to patient's inability to fully participate in all ADL, functional and recreational activities. Total Duration:  PT Patient Time In/Time Out  Time In: 0735  Time Out: 0830    Rehabilitation Potential For Stated Goals: Excellent  Regarding Yoselyn Olsen therapy, I certify that the treatment plan above will be carried out by a therapist or under their direction.   Thank you for this referral,  Aniket Kiran Flash     Referring Physician Signature: Gurpreet Rivas,*                 PAIN/SUBJECTIVE:   Initial: Pain Intensity 1: 8/10 Post Session:  6/10   HISTORY:   History of Injury/Illness (Reason for Referral):  Yenifer Beck presents to physical therapy today with complaints of L knee pain after undergoing a TKA on 12/3/19. He has been dealing with knee OA for a number of years and had his R knee replaced in August and decided to go ahead and have his L replaced. He feels that he is progressing faster with his L knee than he did his R. He continues to have complaints of medial knee pain with prolonged sitting, standing and walking and the inability to negotiate stairs reciprocally yet. He started walking with a straight cane about 1 week ago. He has front steps into his house but no stairs inside. He wants to return to normal activity around his house, driving without knee pain and sleeping through the night. He is a retired  in [employer]. He has had both knees scoped in the past.   Past Medical History/Comorbidities:   Mr. Peyton Recinos  has a past medical history of Arrhythmia (2009-- per pt caused by dilaudid), Arthritis, Diabetes mellitus, type 2 ([address] Elevated cholesterol, GERD (gastroesophageal reflux disease), History of kidney stones (2002), Hypertension, Morbid obesity ([address] PUD (peptic ulcer disease) (2003), Sleep apnea, Testosterone deficiency, and Vitamin D deficiency (06/11/2014). Mr. Peyton Recinos  has a past surgical history that includes hx colonoscopy (07/17/2015); pr neurological procedure unlisted (3/2011 at [facility]); hx orthopaedic; hx heent (2009); hx gi (early 1990s); and hx knee replacement (Right, 08/27/2019).   Social History/Living Environment:     Lives with wife  Prior Level of Function/Work/Activity:  Has been dealing with knee OA for a number of years limiting his activity level     Ambulatory/Rehab Services H2 Model Falls Risk Assessment   Risk Factors:       (1)  Gender [Male] Ability to Rise from Chair:       (1)  Pushes up, successful in one attempt   Falls Prevention Plan:       No modifications necessary   Total: (5 or greater = High Risk): 2   ©2010 [facility] Patent #[id number]. Federal Law prohibits the replication, distribution or use without written permission from [facility]   Current Medications:       Current Outpatient Medications:     oxyCODONE IR (ROXICODONE) 10 mg tab immediate release tablet, Take 10 mg by mouth every six (6) hours as needed for Pain., Disp: , Rfl:     diphenhydrAMINE-acetaminophen (TYLENOL PM EXTRA STRENGTH)  mg tab, Take 2 Tabs by mouth every four to six (4-6) hours as needed for Pain., Disp: , Rfl:     psyllium husk (METAMUCIL PO), 1 tablespoon mix with 6 oz of water, Disp: , Rfl:     aspirin delayed-release 81 mg tablet, Take 1 Tab by mouth every twelve (12) hours every twelve (12) hours. , Disp: 60 Tab, Rfl: 0    enalapril 10 mg tab 10 mg, hydroCHLOROthiazide 25 mg tab 25 mg, Take 1 Dose by mouth daily. , Disp: , Rfl:     rosuvastatin (CRESTOR) 40 mg tablet, Take 40 mg by mouth daily. , Disp: , Rfl:     melatonin 10 mg tab, Take 1 Tab by mouth nightly as needed. , Disp: , Rfl:     SITagliptin-metFORMIN (JANUMET XR) 50-1,000 mg TM24, 1 p.o. twice daily (Patient taking differently: 1 tab twice daily by mouth), Disp: 60 Tab, Rfl: 11    rosuvastatin (CRESTOR) 40 mg tablet, Take 1 Tab by mouth daily for 30 days. , Disp: 30 Tab, Rfl: 11    propranolol (INDERAL) 40 mg tablet, Take 2 Tabs by mouth two (2) times a day. (Patient taking differently: Take 80 mg by mouth daily. Pt takes 2 tablets 1x each day. ), Disp: 120 Tab, Rfl: 11    fluticasone (FLONASE) 50 mcg/actuation nasal spray, 2 Sprays by Both Nostrils route daily. (Patient taking differently: 2 Sprays by Both Nostrils route daily as needed (sinus infection). ), Disp: 1 Bottle, Rfl: 6   Date Last Reviewed:  12/23/2019 Number of Personal Factors/Comorbidities that affect the Plan of Care: 0: LOW COMPLEXITY   EXAMINATION:   OBSERVATION/SWELLING:      RIGHT LEFT   Joint line 40 cm 41.5 cm   Superior patellar pole 45 cm 48 cm   5cm proximal superior   patellar pole 47 cm 49 cm       PALPATION: TTP with patella mobilizations and palpation to suprapatella pouch and quads      ROM: measured in degrees      RIGHT LEFT   Knee extension +8 -5   Knee flexion 135 98 supine with strap   Ankle DF nt nt     STRENGTH: not tested due to recent surgery but poor quad activation due to swelling    SPECIAL TESTS: not tested due to recent surgery    FUNCTIONAL MOBILITY:      RIGHT LEFT   Overhead Deep Squat nt nt   Step up nt nt   Step down nt nt   TUG 15 seconds with SC         BALANCE/VESTIBULAR:      RIGHT LEFT   Single leg nt nt   Tandem nt nt   Rhomberg nt nt        FLEXIBILITY:      RIGHT LEFT   Rectus Femoris  restricted   Psoas  restricted   Adductors     Hamstrings  restricted   Gastrocnemius  restricted     JOINT MOBILITY:      RIGHT LEFT   Patellofemoral  Restricted in all directions   Tibiofemoral  nt     TTP: tenderness to palpation  nt: not tested     Body Structures Involved:  1. Nerves  2. Bones  3. Joints  4. Muscles Body Functions Affected:  1. Sensory/Pain  2. Neuromusculoskeletal  3. Movement Related Activities and Participation Affected:  1. General Tasks and Demands  2. Mobility  3. Self Care  4.  Interpersonal Interactions and Relationships   Number of elements (examined above) that affect the Plan of Care: 4+: HIGH COMPLEXITY   CLINICAL PRESENTATION:   Presentation: Stable and uncomplicated: LOW COMPLEXITY   CLINICAL DECISION MAKING:   Use of outcome tool(s) and clinical judgement create a POC that gives a: Clear prediction of patient's progress: LOW COMPLEXITY

## 2019-12-23 NOTE — PROGRESS NOTES
Justin Martin  : 1965  [facility]  Secondary:  [address] at [address]  [phone]   [phone]      OUTPATIENT PHYSICAL THERAPY: Daily Treatment Note 2019  Visit Count:  1    ICD-10: Treatment Diagnosis: Left knee pain (25.562); Muscle weakness, generalized (M62.81)  Precautions/Allergies:   Crestor [rosuvastatin]; Dilaudid [hydromorphone (bulk)]; and Tylox [oxycodone-acetaminophen]   TREATMENT PLAN:  Effective Dates: 2019 TO 2020 (60 days). Frequency/Duration: 1-2 times a week for 60 Day(s)    Pre-treatment Symptoms/Complaints:  Left knee pain  Pain: Initial: Pain Intensity 1: 8/10 Post Session:  6/10   Medications Last Reviewed:  2019  Updated Objective Findings:  See evaluation note from today  TREATMENT:     Therapeutic Exercise: (15 Minutes):  Exercises per grid below to improve mobility, strength, balance and coordination. Required minimal visual, verbal, manual and tactile cues to promote proper body alignment and promote proper body mechanics. Progressed resistance, range, repetitions and complexity of movement as indicated. Date:  2019   Activity/Exercise Parameters   Calf stretch - long sitting and standing 3x30 seconds each   Quad sets x30-40   Heel slides - seated and supine with green strap x10 each                     Manual Therapy (    Soft Tissue Mobilization Duration  Duration: 10 Minutes): Manual techniques to facilitate improved motion and decreased pain. (Used abbreviations: MET - muscle energy technique; PNF - proprioceptive neuromuscular facilitation; NMR - neuromuscular re-education; a/p - anterior to posterior; p/a - posterior to anterior)   · Patella mobilizations - all directions  · Soft tissue mobilizations to L hamstrings, calf, quad    Treatment/Session Summary:    · Response to Treatment:  Frandy tolerated treatment well today.  He will benefit from regaining terminal knee extension to improve quad output and enable him to progress off assistive device. He exhibits a good understanding of his HEP. · Communication/Consultation:  None today  · Equipment provided today:  HEP  · Recommendations/Intent for next treatment session: Next visit will focus on swelling management, knee extension ROM, quad strength, knee flexion ROM.     Total Treatment Billable Duration:  25 minute evaluation, 15 minutes therex, 10 minutes manual  PT Patient Time In/Time Out  Time In: 1098  Time Out: 0830  [facility]    Future Appointments   Date Time Provider Yessy Queen   12/24/2019  9:30 AM Mirian Chun PT [facility]   12/27/2019  2:40 PM Miriam Austin DO [facility] PST PST   12/30/2019  2:30 PM Terrtanna Adames [facility]   12/31/2019  9:30 AM Dre Adames [facility]   1/6/2020 10:30 AM Terrace Rosangela [facility]   1/9/2020  1:30 PM Terrace Rosangela [facility]   1/13/2020  2:30 PM Terrace Rosangela [facility]   1/14/2020  3:20 PM PP SLEEP GREGORY JOHNSON [facility] PSCD PP   1/16/2020  1:30 PM Terrace Rosangela [facility]

## 2019-12-24 ENCOUNTER — HOSPITAL ENCOUNTER (OUTPATIENT)
Dept: PHYSICAL THERAPY | Age: 54
Discharge: HOME | End: 2019-12-24
Payer: COMMERCIAL

## 2019-12-24 PROCEDURE — 97110 THERAPEUTIC EXERCISES: CPT

## 2019-12-24 PROCEDURE — 97140 MANUAL THERAPY 1/> REGIONS: CPT

## 2019-12-24 NOTE — PROGRESS NOTES
Ginny Gómezmellojess  : 1965  Primary: [facility]  Secondary:  [address] at [address], [address] [place] [address]  [phone]   [phone]      OUTPATIENT PHYSICAL THERAPY: Daily Treatment Note 2019  Visit Count:  2    ICD-10: Treatment Diagnosis: Left knee pain (25.562); Muscle weakness, generalized (M62.81)  Precautions/Allergies:   Crestor [rosuvastatin]; Dilaudid [hydromorphone (bulk)]; and Tylox [oxycodone-acetaminophen]   TREATMENT PLAN:  Effective Dates: 2019 TO 2020 (60 days). Frequency/Duration: 1-2 times a week for 60 Day(s)    Pre-treatment Symptoms/Complaints:  Pt. Reports good tolerance to initial exercises at PT. Pain: Initial: Pain Intensity 1: 8/10 Post Session:  10   Medications Last Reviewed:  2019  Updated Objective Findings:  None Today  TREATMENT:     Therapeutic Exercise: (40 Minutes):  Exercises per grid below to improve mobility, strength, balance and coordination. Required minimal visual, verbal, manual and tactile cues to promote proper body alignment and promote proper body mechanics. Progressed resistance, range, repetitions and complexity of movement as indicated. Date:  2019   Activity/Exercise Parameters   Calf stretch - long sitting and standing 4 minutes   Quad sets 6 minutes   Heel slides - seated and supine with green strap 10 minutes   Knee extension ROM 5 minutes   Nu Step 5 minutes   Lateral walking with UE assistance 3 x 10   Balance 5 minutes     Manual Therapy (    Soft Tissue Mobilization Duration  Duration: 15 Minutes): Manual techniques to facilitate improved motion and decreased pain.  (Used abbreviations: MET - muscle energy technique; PNF - proprioceptive neuromuscular facilitation; NMR - neuromuscular re-education; a/p - anterior to posterior; p/a - posterior to anterior)   · Patella mobilizations - all directions  · Soft tissue mobilizations to L hamstrings, calf, quad    Treatment/Session Summary:    · Response to Treatment:  Pt. has difficulty with initiating Quadriceps control during quad setting exercise today. L knee extension ROM is painful additionally. .  · Communication/Consultation:  None today  · Equipment provided today:  HEP  · Recommendations/Intent for next treatment session: Next visit will focus on swelling management, knee extension ROM, quad strength, knee flexion ROM. Total Treatment Billable Duration:  55 minutes total time.    PT Patient Time In/Time Out  Time In: 0930  Time Out: [address], PT    Future Appointments   Date Time Provider Yessy Queen   12/27/2019  2:40 PM DO ANGELY Daley PST PST   12/30/2019  2:30 PM Annie Florida [facility]   12/31/2019  9:30 AM Annie Florida [facility]   1/6/2020 10:30 AM Annie Florida [facility]   1/9/2020  1:30 PM Annie Florida [facility]   1/13/2020  2:30 PM Annie Florida [facility]   1/14/2020  3:20 PM PP SLEEP GREGORY JOHNSON [facility] PS[facility] PP   1/16/2020  1:30 PM Annie Florida [facility]

## 2019-12-30 ENCOUNTER — HOSPITAL ENCOUNTER (OUTPATIENT)
Dept: PHYSICAL THERAPY | Age: 54
Discharge: HOME | End: 2019-12-30
Payer: COMMERCIAL

## 2019-12-30 PROCEDURE — 97110 THERAPEUTIC EXERCISES: CPT

## 2019-12-30 PROCEDURE — 97140 MANUAL THERAPY 1/> REGIONS: CPT

## 2019-12-30 NOTE — PROGRESS NOTES
Attila Duncan  : 1965  Primary: [facility]  Secondary:  [address] at [address], [address] [place] [address]  [phone]   [phone]      OUTPATIENT PHYSICAL THERAPY: Daily Treatment Note 2019  Visit Count:  3    ICD-10: Treatment Diagnosis: Left knee pain (25.562); Muscle weakness, generalized (M62.81)  Precautions/Allergies:   Crestor [rosuvastatin]; Dilaudid [hydromorphone (bulk)]; and Tylox [oxycodone-acetaminophen]   TREATMENT PLAN:  Effective Dates: 2019 TO 2020 (60 days). Frequency/Duration: 1-2 times a week for 60 Day(s)    Pre-treatment Symptoms/Complaints:  Quad has been sore. Also feels unsteady on his feet when walking without his cane  Pain: Initial: Pain Intensity 1: 4/10 Post Session:  4/10   Medications Last Reviewed:  2019  Updated Objective Findings:  Passive knee flexion 116 degrees  TREATMENT:     Therapeutic Exercise: (40 Minutes):  Exercises per grid below to improve mobility, strength, balance and coordination. Required minimal visual, verbal, manual and tactile cues to promote proper body alignment and promote proper body mechanics. Progressed resistance, range, repetitions and complexity of movement as indicated. Date:  2019   Activity/Exercise Parameters   Calf stretch - long sitting and standing 4 minutes   Quad sets with NMES 10 minutes   Heel slides - supine 4 minutes   Knee extension ROM 3 minutes   Nu Step 1 mile   Lateral walking with UE assistance --   Balance --     Manual Therapy (    Soft Tissue Mobilization Duration  Duration: 15 Minutes): Manual techniques to facilitate improved motion and decreased pain.  (Used abbreviations: MET - muscle energy technique; PNF - proprioceptive neuromuscular facilitation; NMR - neuromuscular re-education; a/p - anterior to posterior; p/a - posterior to anterior)   · Patella mobilizations - all directions  · Soft tissue mobilizations to L hamstrings, calf, quad    Treatment/Session Summary:    · Response to Treatment:  Dyan Rasmussen exhibits improved knee extension and flexion ROM but still has a poor tolerance to sustained knee extension in long sitting position. Tolerated NMES well and exhibited improved quad activation afterwards. .  · Communication/Consultation:  None today  · Equipment provided today:  HEP  · Recommendations/Intent for next treatment session: Next visit will focus on swelling management, knee extension ROM, quad strength, knee flexion ROM.     Total Treatment Billable Duration:  40 minutes therex, 15 minutes manual  PT Patient Time In/Time Out  Time In: 2360  Time Out: 1530  [facility] Zamudio    Future Appointments   Date Time Provider Yessy Queen   12/31/2019  9:30 AM Armen BARR [facility]   1/2/2020  2:00 PM Catharine Goltz, DO [facility] PST PST   1/6/2020 10:30 AM Corby Burnett [facility]   1/9/2020  1:30 PM Corby Burnett [facility]   1/13/2020  2:30 PM Corby Burnett [facility]   1/14/2020  3:20 PM PP SLEEP GREGORY AU PS[facility] PP   1/16/2020  1:30 PM Corby Burnett [facility]

## 2019-12-31 ENCOUNTER — HOSPITAL ENCOUNTER (OUTPATIENT)
Dept: PHYSICAL THERAPY | Age: 54
Discharge: HOME | End: 2019-12-31
Payer: COMMERCIAL

## 2019-12-31 PROCEDURE — 97110 THERAPEUTIC EXERCISES: CPT

## 2019-12-31 PROCEDURE — 97140 MANUAL THERAPY 1/> REGIONS: CPT

## 2019-12-31 NOTE — PROGRESS NOTES
Michael Connie  : 1965  Primary: [facility]  Secondary:  [address] at [place] [id number] [address], [address] [place] [address]  [phone]   [phone]      OUTPATIENT PHYSICAL THERAPY: Daily Treatment Note 2019  Visit Count:  4    ICD-10: Treatment Diagnosis: Left knee pain (25.562); Muscle weakness, generalized (M62.81)  Precautions/Allergies:   Crestor [rosuvastatin]; Dilaudid [hydromorphone (bulk)]; and Tylox [oxycodone-acetaminophen]   TREATMENT PLAN:  Effective Dates: 2019 TO 2020 (60 days). Frequency/Duration: 1-2 times a week for 60 Day(s)    Pre-treatment Symptoms/Complaints:  Knee and quad is sore from yesterdays treatment  Pain: Initial: Pain Intensity 1: 5/10 Post Session:  10   Medications Last Reviewed:  2019  Updated Objective Findings:  None Today  TREATMENT:     Therapeutic Exercise: (40 Minutes):  Exercises per grid below to improve mobility, strength, balance and coordination. Required minimal visual, verbal, manual and tactile cues to promote proper body alignment and promote proper body mechanics. Progressed resistance, range, repetitions and complexity of movement as indicated. Date:  2019   Activity/Exercise Parameters   Calf stretch - long sitting  5x30 seconds   Quad sets with NMES 10 minutes   Heel slides - supine --   Knee extension ROM --   Nu Step 10 minutes   Bridge with LEs over wedge 2x15   SAQ 2# 2x20   Clams x30  bilateral             Manual Therapy (    Soft Tissue Mobilization Duration  Duration: 15 Minutes): Manual techniques to facilitate improved motion and decreased pain.  (Used abbreviations: MET - muscle energy technique; PNF - proprioceptive neuromuscular facilitation; NMR - neuromuscular re-education; a/p - anterior to posterior; p/a - posterior to anterior)   · Patella mobilizations - all directions  · Soft tissue mobilizations to L hamstrings, calf, quad    Modalities: ice pack to L knee at end of session - 7 minutes    Treatment/Session Summary:    · Response to Treatment:  CKC exercises were not performed today due to increased soreness from yesterdays session. He tolerated NMES well and able to increase intensity for quad output. He reported leg felt more stable by end of session with walking. · Communication/Consultation:  None today  · Equipment provided today:  None today  · Recommendations/Intent for next treatment session: Next visit will focus on swelling management, knee extension ROM, quad strength, knee flexion ROM.     Total Treatment Billable Duration:  40 minutes therex, 15 minutes manual  PT Patient Time In/Time Out  Time In: 0930  Time Out: 1035  Edgar Ponce    Future Appointments   Date Time Provider Yessy Queen   1/2/2020  2:00 PM Elizabeth Palmer DO [facility] PST PST   1/6/2020 10:30 AM Bakari Inks [facility]   1/9/2020  1:30 PM Bakari Inks [facility]   1/13/2020  2:30 PM Bakari Inks [facility]   1/14/2020  3:20 PM PP SLEEP GREGORY JOHNSON [facility] PS[facility] PP   1/16/2020  1:30 PM Bakari Inks [facility]

## 2020-01-06 ENCOUNTER — HOSPITAL ENCOUNTER (OUTPATIENT)
Dept: PHYSICAL THERAPY | Age: 55
Discharge: HOME | End: 2020-01-06
Payer: COMMERCIAL

## 2020-01-06 PROCEDURE — 97140 MANUAL THERAPY 1/> REGIONS: CPT

## 2020-01-06 PROCEDURE — 97110 THERAPEUTIC EXERCISES: CPT

## 2020-01-06 NOTE — PROGRESS NOTES
Colleen Argueta  : 1965  Primary: Diallo Paulino [facility]  Secondary:  [address] [facility] [address] [facility]  [phone]   [phone]      OUTPATIENT PHYSICAL THERAPY: Daily Treatment Note 2020  Visit Count:  5    ICD-10: Treatment Diagnosis: Left knee pain (25.562); Muscle weakness, generalized (M62.81)  Precautions/Allergies:   Crestor [rosuvastatin]; Dilaudid [hydromorphone (bulk)]; and Tylox [oxycodone-acetaminophen]   TREATMENT PLAN:  Effective Dates: 2019 TO 2020 (60 days). Frequency/Duration: 1-2 times a week for 60 Day(s)    Pre-treatment Symptoms/Complaints:  Pain in quad is better, more stiffness around top of patella. He will follow up with MD this week  Pain: Initial: Pain Intensity 1: 3/10 Post Session:  3/10   Medications Last Reviewed:  2020  Updated Objective Findings:  -1 deg to 122 deg  TREATMENT:     Therapeutic Exercise: (40 Minutes):  Exercises per grid below to improve mobility, strength, balance and coordination. Required minimal visual, verbal, manual and tactile cues to promote proper body alignment and promote proper body mechanics. Progressed resistance, range, repetitions and complexity of movement as indicated. Date:  2020   Activity/Exercise Parameters   Calf stretch - standing 3x30 seconds   Quad sets with NMES 10 minutes   Heel slides - supine x10   Knee extension ROM --   Nu Step 10 minutes   Bridge with LEs over wedge --   SAQ 2# --   Clams --   Standing TKE - purple band 3 minutes   Shuttle press DL 6, SL 4 3x15 each   DL balance with EC and EO with chops/lifts on blue foam 5 minutes   Marching in place with SL balance 2 minutes   Robe stepping 2 minutes     Manual Therapy (    Soft Tissue Mobilization Duration  Duration: 15 Minutes): Manual techniques to facilitate improved motion and decreased pain.  (Used abbreviations: MET - muscle energy technique; PNF - proprioceptive neuromuscular facilitation; NMR - neuromuscular re-education; a/p - anterior to posterior; p/a - posterior to anterior)   · Patella mobilizations - all directions  · Soft tissue mobilizations to L hamstrings, calf, quad    Modalities: ice pack to L knee at end of session - 10 minutes    Treatment/Session Summary:    · Response to Treatment:  Sierra Howard exhibits improved knee extension and flexion ROM compared to last week. He tolerated addition of CKC exercises well without increase in knee pain, only reports of tightness. He is progressing as expected. .  · Communication/Consultation:  None today  · Equipment provided today:  None today  · Recommendations/Intent for next treatment session: Next visit will focus on swelling management, knee extension ROM, quad strength, knee flexion ROM.     Total Treatment Billable Duration:  40 minutes therex, 15 minutes manual  PT Patient Time In/Time Out  Time In: 1030  Time Out: 1135  Ana Mei    Future Appointments   Date Time Provider Yessy Queen   1/9/2020  1:30 PM [facility]   1/13/2020  2:30 PM Low De La Rosa [facility]   1/14/2020  3:20 PM PP SLEEP GREGORY JOHNSON SSA PSCD PP   1/16/2020  1:30 PM Low De La Rosa [facility]   7/2/2020  9:10 AM PST LAB SSA PST PST   7/9/2020  2:00 PM DO ANGELY Hansen PST PST   1/4/2021  8:00 AM PST LAB SSA PST PST   1/11/2021  1:40 PM DO ANGELY Hansen PST PST

## 2020-01-09 ENCOUNTER — HOSPITAL ENCOUNTER (OUTPATIENT)
Dept: PHYSICAL THERAPY | Age: 55
Discharge: HOME | End: 2020-01-09
Payer: COMMERCIAL

## 2020-01-09 PROCEDURE — 97110 THERAPEUTIC EXERCISES: CPT

## 2020-01-09 PROCEDURE — 97140 MANUAL THERAPY 1/> REGIONS: CPT

## 2020-01-09 NOTE — PROGRESS NOTES
Mary Mccullough  : 1965  Primary: Ectorsusy Roldan [facility]  Secondary:  [address] [facility], [address] [facility]  [phone]   [phone]      OUTPATIENT PHYSICAL THERAPY: Daily Treatment Note 2020  Visit Count:  6    ICD-10: Treatment Diagnosis: Left knee pain (25.562); Muscle weakness, generalized (M62.81)  Precautions/Allergies:   Crestor [rosuvastatin]; Dilaudid [hydromorphone (bulk)]; and Tylox [oxycodone-acetaminophen]   TREATMENT PLAN:  Effective Dates: 2019 TO 2020 (60 days). Frequency/Duration: 1-2 times a week for 60 Day(s)    Pre-treatment Symptoms/Complaints: MD is pleased with current progress. Doesn't need to see him for 6 months. Doing better overall, has most trouble with descending stairs. Pain: Initial: Pain Intensity 1: 3/10 Post Session:  3/10   Medications Last Reviewed:  2020  Updated Objective Findings:  None Today  TREATMENT:     Therapeutic Exercise: (40 Minutes):  Exercises per grid below to improve mobility, strength, balance and coordination. Required minimal visual, verbal, manual and tactile cues to promote proper body alignment and promote proper body mechanics. Progressed resistance, range, repetitions and complexity of movement as indicated. Date:  2020   Activity/Exercise Parameters   Calf stretch - standing 3x30 seconds   Quad sets with NMES 10 minutes   Heel slides - supine --   Knee extension ROM --   Nu Step 10 minutes   Bridge - 8.8# KB x30-40   SAQ 2# --   Clams --   Standing TKE - purple band --   Shuttle press eccentric 8 cords 3x15    DL balance with EC and EO with chops/lifts on blue foam --   Marching in place with SL balance --   Stairs 1 flight     Manual Therapy (    Soft Tissue Mobilization Duration  Duration: 10 Minutes): Manual techniques to facilitate improved motion and decreased pain.  (Used abbreviations: MET - muscle energy technique; PNF - proprioceptive neuromuscular facilitation; NMR - neuromuscular re-education; a/p - anterior to posterior; p/a - posterior to anterior)   · Patella mobilizations - all directions  · Soft tissue mobilizations to L hamstrings, calf, quad    Modalities: none    Treatment/Session Summary:    · Response to Treatment:  Jazmine Flood has difficulty descending stairs secondary to decreased eccentric quad control and increased stress through his knee. He does well with ascending stairs. His quads fatigue quickly with SLRs.   · Communication/Consultation:  None today  · Equipment provided today:  None today  · Recommendations/Intent for next treatment session: Next visit will focus on swelling management, knee extension ROM, quad and hip strength, stair climbing    Total Treatment Billable Duration:  40 minutes therex, 10 minutes manual  PT Patient Time In/Time Out  Time In: 1330  Time Out: 1430  Cecilia Edwards    Future Appointments   Date Time Provider Yessy Queen   1/13/2020  2:30 PM Isaiah MICHAUD [facility]   1/14/2020  3:20 PM PP SLEEP GREGORY JOHNSON SSA PSCD PP   1/16/2020  1:30 PM Isaiah MICHAUD [facility]   7/2/2020  9:10 AM PST LAB SSA PST PST   7/9/2020  2:00 PM William Cartagena, DO SSA PST PST   1/4/2021  8:00 AM PST LAB SSA PST PST   1/11/2021  1:40 PM William Cartagena, DO SSA PST PST

## 2020-01-13 ENCOUNTER — HOSPITAL ENCOUNTER (OUTPATIENT)
Dept: PHYSICAL THERAPY | Age: 55
Discharge: HOME | End: 2020-01-13
Payer: COMMERCIAL

## 2020-01-13 PROCEDURE — 97140 MANUAL THERAPY 1/> REGIONS: CPT

## 2020-01-13 PROCEDURE — 97110 THERAPEUTIC EXERCISES: CPT

## 2020-01-13 NOTE — PROGRESS NOTES
Shital Belle  : 1965  Primary: [facility]  Secondary:  [address] at [facility], [address] [facility]  [phone]   [phone]      OUTPATIENT PHYSICAL THERAPY: Daily Treatment Note 2020  Visit Count:  7    ICD-10: Treatment Diagnosis: Left knee pain (25.562); Muscle weakness, generalized (M62.81)  Precautions/Allergies:   Crestor [rosuvastatin]; Dilaudid [hydromorphone (bulk)]; and Tylox [oxycodone-acetaminophen]   TREATMENT PLAN:  Effective Dates: 2019 TO 2020 (60 days). Frequency/Duration: 1-2 times a week for 60 Day(s)    Pre-treatment Symptoms/Complaints: Walked a lot on Saturday around [place]. Didn't have issues during the walk but had difficulty sleeping that night and has bee sore and stiff the last 2 days. Pain: Initial: Pain Intensity 1: 410 Post Session:  3/10   Medications Last Reviewed:  2020  Updated Objective Findings:  Passive flexion 122 degrees  TREATMENT:     Therapeutic Exercise: (40 Minutes):  Exercises per grid below to improve mobility, strength, balance and coordination. Required minimal visual, verbal, manual and tactile cues to promote proper body alignment and promote proper body mechanics. Progressed resistance, range, repetitions and complexity of movement as indicated. Date:  2020   Activity/Exercise Parameters   Calf stretch - standing 3x30 seconds   Quad sets with NMES --   Heel slides - supine --   Knee extension ROM --   Nu Step 10 minutes   Bridge  x30   SAQ 2# --   Clams --   Standing TKE - purple band x30-40   Shuttle press 6 cords (DL), 4 cords (SL) 3x15 each   Balance board - both directions 4 minutes   Marching and side steps 2 laps each   Stairs --     Manual Therapy (    Soft Tissue Mobilization Duration  Duration: 10 Minutes): Manual techniques to facilitate improved motion and decreased pain.  (Used abbreviations: MET - muscle energy technique; PNF - proprioceptive neuromuscular facilitation; NMR - neuromuscular re-education; a/p - anterior to posterior; p/a - posterior to anterior)   · Patella mobilizations - all directions  · Soft tissue mobilizations to L hamstrings, calf, quad    Modalities: none    Treatment/Session Summary:    · Response to Treatment:  Audi knee was a little more swollen and warm today from inflammation as a result of prolonged walking on Saturday. He does exhibit improved tolerance to sustained knee extension. Marching shows more instabilty on L LE compared to R. He will benefit from more therapy to focus on LE strengthening. .  · Communication/Consultation:  None today  · Equipment provided today:  None today  · Recommendations/Intent for next treatment session: Next visit will focus on swelling management, knee extension ROM, quad and hip strength, stair climbing    Total Treatment Billable Duration:  40 minutes therex, 10 minutes manual  PT Patient Time In/Time Out  Time In: 1430  Time Out: 1530  Celina Matute    Future Appointments   Date Time Provider Yessy Queen   1/14/2020  3:20 PM PP SLEEP GREGORY JOHNSON SSA PSCD PP   1/16/2020  1:30 PM Jackelyn Dakin [facility]   7/2/2020  9:10 AM PST LAB SSA PST PST   7/9/2020  2:00 PM DO ANGELY Pereyra PST PST   1/4/2021  8:00 AM PST LAB SSA PST PST   1/11/2021  1:40 PM DO ANGELY Pereyra PST PST

## 2020-01-16 ENCOUNTER — HOSPITAL ENCOUNTER (OUTPATIENT)
Dept: PHYSICAL THERAPY | Age: 55
Discharge: HOME | End: 2020-01-16
Payer: COMMERCIAL

## 2020-01-16 PROCEDURE — 97140 MANUAL THERAPY 1/> REGIONS: CPT

## 2020-01-16 PROCEDURE — 97110 THERAPEUTIC EXERCISES: CPT

## 2020-01-16 NOTE — PROGRESS NOTES
Felice Staley  : 1965  Primary: Sona Johnson [facility]  Secondary:  [address] at [facility], [address] [facility]  [phone]   [phone]      OUTPATIENT PHYSICAL THERAPY: Daily Treatment Note 2020  Visit Count:  8    ICD-10: Treatment Diagnosis: Left knee pain (25.562); Muscle weakness, generalized (M62.81)  Precautions/Allergies:   Crestor [rosuvastatin]; Dilaudid [hydromorphone (bulk)]; and Tylox [oxycodone-acetaminophen]   TREATMENT PLAN:  Effective Dates: 2019 TO 2020 (60 days). Frequency/Duration: 1-2 times a week for 60 Day(s)    Pre-treatment Symptoms/Complaints: Knee feels better compared to last visit. Tightness in quad is much better, most tightness in knee  Pain: Initial: Pain Intensity 1: 3/10 Post Session:  3/10   Medications Last Reviewed:  2020  Updated Objective Findings:  Passive flexion 126 degrees  TREATMENT:     Therapeutic Exercise: (40 Minutes):  Exercises per grid below to improve mobility, strength, balance and coordination. Required minimal visual, verbal, manual and tactile cues to promote proper body alignment and promote proper body mechanics. Progressed resistance, range, repetitions and complexity of movement as indicated. Date:  2020   Activity/Exercise Parameters   Calf stretch - standing 3x30 seconds   Forward step ups - 8\" 2x10   Heel slides - supine x10   Knee extension ROM --   Nu Step 10 minutes   Bridge  x30   Seated hamstring curls - green band 2x20   Clams --   Standing TKE - purple band --   Shuttle press SL - 5 cords  3x15    Balance board - both directions 4 minutes   Marching and side steps 3 laps each   Sit to stand 2x10     Manual Therapy (    Soft Tissue Mobilization Duration  Duration: 10 Minutes): Manual techniques to facilitate improved motion and decreased pain.  (Used abbreviations: MET - muscle energy technique; PNF - proprioceptive neuromuscular facilitation; NMR - neuromuscular re-education; a/p - anterior to posterior; p/a - posterior to anterior)   · Patella mobilizations - all directions  · Soft tissue mobilizations to L hamstrings, calf, quad    Modalities: none    Treatment/Session Summary:    · Response to Treatment:  Frandy tolerated treatment well today. Knee flexion ROM is improving as well as quad strength. Able to progress step ups to 8\" box but lateral step ups were painful on knee. He was able to perform sit to stands without UE suppprt. .  · Communication/Consultation:  None today  · Equipment provided today:  None today  · Recommendations/Intent for next treatment session: Next visit will focus on swelling management, knee extension ROM, quad and hip strength, stair climbing    Total Treatment Billable Duration:  40 minutes therex, 10 minutes manual  PT Patient Time In/Time Out  Time In: 1330  Time Out: 1430  Mj Ast    Future Appointments   Date Time Provider Yessy Queen   1/20/2020  2:30 PM [facility]   1/22/2020  2:30 PM [facility]   1/28/2020  3:30 PM [facility]   1/30/2020 10:30 AM Conception Yassine BARR [facility]   4/15/2020  1:40 PM PETE Wooten PSCD PP   7/2/2020  9:10 AM PST LAB SSA PST PST   7/9/2020  2:00 PM DO ANGELY Taylor PST PST   1/4/2021  8:00 AM PST LAB SSA PST PST   1/11/2021  1:40 PM DO ANGELY Taylor PST PST

## 2020-01-20 ENCOUNTER — HOSPITAL ENCOUNTER (OUTPATIENT)
Dept: PHYSICAL THERAPY | Age: 55
Discharge: HOME | End: 2020-01-20
Payer: COMMERCIAL

## 2020-01-20 PROCEDURE — 97140 MANUAL THERAPY 1/> REGIONS: CPT

## 2020-01-20 PROCEDURE — 97110 THERAPEUTIC EXERCISES: CPT

## 2020-01-20 NOTE — PROGRESS NOTES
Levi Ale  : 1965  Primary: Tammie [facility]  Secondary:  [address] at [facility], [address] [facility]  [phone]   [phone]      OUTPATIENT PHYSICAL THERAPY: Daily Treatment Note 2020  Visit Count:  9    ICD-10: Treatment Diagnosis: Left knee pain (25.562); Muscle weakness, generalized (M62.81)  Precautions/Allergies:   Crestor [rosuvastatin]; Dilaudid [hydromorphone (bulk)]; and Tylox [oxycodone-acetaminophen]   TREATMENT PLAN:  Effective Dates: 2019 TO 2020 (60 days). Frequency/Duration: 1-2 times a week for 60 Day(s)    Pre-treatment Symptoms/Complaints: Tweaked knee a little yesterday with some hyperflexion movements but feeling better today. Pain: Initial: Pain Intensity 1: 2/10 Post Session:  2/10   Medications Last Reviewed:  2020  Updated Objective Findings:  Active extension +1 degrees  TREATMENT:     Therapeutic Exercise: (30 Minutes):  Exercises per grid below to improve mobility, strength, balance and coordination. Required minimal visual, verbal, manual and tactile cues to promote proper body alignment and promote proper body mechanics. Progressed resistance, range, repetitions and complexity of movement as indicated. Date:  2020   Activity/Exercise Parameters   Calf stretch - standing --   Forward step ups - 8\" 2x10   Heel slides - supine --   Standing hip abduction 2x15 bilateral   Upright bike 7 minutes   Bridge  --   Seated hamstring curls - green band --   Tandem balance 3x30 seconds   SL balance with taps 3 minutes   Shuttle press DL and eccentric SL - 8 cords  3x15    Balance board - both directions 4 minutes   Marching, side steps, tandem walk 3 laps each   Sit to stand 2x10     Manual Therapy (    Soft Tissue Mobilization Duration  Duration: 10 Minutes): Manual techniques to facilitate improved motion and decreased pain.  (Used abbreviations: MET - muscle energy technique; PNF - proprioceptive neuromuscular facilitation; NMR - neuromuscular re-education; a/p - anterior to posterior; p/a - posterior to anterior)   · Patella mobilizations - all directions  · Soft tissue mobilizations to L hamstrings, calf, quad    Treatment/Session Summary:    · Response to Treatment:  Gerardo Pedroza exhibits full restoration of L knee extension ROM. This is allowing his quads to progress with strengthening exercises. He exhibits a decrease in single leg balance bilaterally. He is easily winded due to deconditioning.    · Communication/Consultation:  None today  · Equipment provided today:  None today  · Recommendations/Intent for next treatment session: Next visit will focus on swelling management, quad and hip strength, stair climbing    Total Treatment Billable Duration:  40 minutes therex, 10 minutes manual  PT Patient Time In/Time Out  Time In: 9850  Time Out: 4813  Paulino Arrington    Future Appointments   Date Time Provider Yessy Queen   1/22/2020  2:30 PM Denisse Altamirano OLIVIA [facility]   1/28/2020  3:30 PM [facility]   1/30/2020 10:30 AM [facility]   4/15/2020  1:40 PM PETE Johnson PSCD PP   7/2/2020  9:10 AM PST LAB ANGELY PST PST   7/9/2020  2:00 PM DO ANGELY Lemos PST PST   1/4/2021  8:00 AM PST LAB SSA PST PST   1/11/2021  1:40 PM DO ANGELY Lemos PST PST

## 2020-01-22 ENCOUNTER — HOSPITAL ENCOUNTER (OUTPATIENT)
Dept: PHYSICAL THERAPY | Age: 55
Discharge: HOME | End: 2020-01-22
Payer: COMMERCIAL

## 2020-01-22 PROCEDURE — 97110 THERAPEUTIC EXERCISES: CPT

## 2020-01-22 PROCEDURE — 97140 MANUAL THERAPY 1/> REGIONS: CPT

## 2020-01-22 NOTE — PROGRESS NOTES
Melony Alter  : 1965  Primary: Omayra Mart [facility]  Secondary:  [address]  at [facility] [address], [address]  [phone]   [phone]      OUTPATIENT PHYSICAL THERAPY: Daily Treatment Note 2020  Visit Count:  10    ICD-10: Treatment Diagnosis: Left knee pain (25.562); Muscle weakness, generalized (M62.81)  Precautions/Allergies:   Crestor [rosuvastatin]; Dilaudid [hydromorphone (bulk)]; and Tylox [oxycodone-acetaminophen]   TREATMENT PLAN:  Effective Dates: 2019 TO 2020 (60 days). Frequency/Duration: 1-2 times a week for 60 Day(s)    Pre-treatment Symptoms/Complaints: Knee is doing well today  Pain: Initial: Pain Intensity 1: 2/10 Post Session:  2/10   Medications Last Reviewed:  2020  Updated Objective Findings:  See evaluation note from today  TREATMENT:     Therapeutic Exercise: (40 Minutes):  Exercises per grid below to improve mobility, strength, balance and coordination. Required minimal visual, verbal, manual and tactile cues to promote proper body alignment and promote proper body mechanics. Progressed resistance, range, repetitions and complexity of movement as indicated. Date:  2020   Activity/Exercise Parameters   Calf stretch - standing 3x30 seconds   Forward step ups onto blue foam x20   Heel slides - supine x10   Standing hip abduction 3x15 bilateral   Upright bike 1.5 miles   Stairs 2 flights   Seated hamstring curls - green band --   Tandem balance 2x30 seconds   Calf raises To fatigue   Leg press in gym 3x15    Seated hamstring curls in gym 2 minutes   Balance on blue foam - chops/lifts and with EC 4 minutes   Sit to stand x10     Manual Therapy (    Soft Tissue Mobilization Duration  Duration: 10 Minutes): Manual techniques to facilitate improved motion and decreased pain.  (Used abbreviations: MET - muscle energy technique; PNF - proprioceptive neuromuscular facilitation; NMR - neuromuscular re-education; a/p - anterior to posterior; p/a - posterior to anterior)   · Patella mobilizations - all directions  · Soft tissue mobilizations to L hamstrings, calf, quad    Treatment/Session Summary:    · Response to Treatment:  Frandy tolerated treatment well today. Progressed some strengthening exercises out into gym on the leg press and hamstring curl machine. He is challanged with single leg balance activities. He will benefit from continued quad strengthening.   · Communication/Consultation:  None today  · Equipment provided today:  None today  · Recommendations/Intent for next treatment session: Next visit will focus on swelling management, quad and hip strength, stair climbing    Total Treatment Billable Duration:  40 minutes therex, 10 minutes manual  PT Patient Time In/Time Out  Time In: 1435  Time Out: 1530  [facility]    Future Appointments   Date Time Provider Yessy Queen   1/28/2020  3:30 PM Sarahy Nicolas [facility]   1/30/2020 10:30 AM Tim BARR [facility]   4/15/2020  1:40 PM Joao Blas NP SSA PSCD PP   7/2/2020  9:10 AM PST LAB SSA PST PST   7/9/2020  2:00 PM DO ANGELY Berman PST PST   1/4/2021  8:00 AM PST LAB SSA PST PST   1/11/2021  1:40 PM Melony Aldridge DO SSA PST PST

## 2020-01-22 NOTE — PROGRESS NOTES
Amara Jensen  : 1965  Primary: Mera Dorantes [facility]  Secondary:  [address] at [place] [address] [address], [address] [place] [address]  [phone]   [phone]        OUTPATIENT PHYSICAL THERAPY:Progress Report 2020   ICD-10: Treatment Diagnosis: Left knee pain (25.562); Muscle weakness, generalized (M62.81)  Precautions/Allergies:   Crestor [rosuvastatin]; Dilaudid [hydromorphone (bulk)]; and Tylox [oxycodone-acetaminophen]   TREATMENT PLAN:  Effective Dates: 2019 TO 2020 (60 days). Frequency/Duration: 1-2 times a week for 60 Day(s) MEDICAL/REFERRING DIAGNOSIS:  Unilateral primary osteoarthritis, left knee [M17.12]   DATE OF ONSET: 12/3/19  REFERRING PHYSICIAN: Marii Tam MD Orders: Eval and Treat  Return MD Appointment: Early 2020     UPDATE (20):  Raeann Ramirez has attended 10 physical therapy visits over the last month and exhibits significant improvement in subjective and objective measures. He reports less pain and tightness in his knee with improved ability to walk and ascend stairs without difficulty. He is sleeping better and able to do light activities around the house. He exhibits functional knee AROM and his quad strength is slowly improving. He exhibits approximately half the quad strength on his L knee compared to R but does have symmetrical hamstring strength. He still has some difficulty descending stairs secondary to decreased eccentric quad strength. He will benefit from continued skilled therapy to address remaining strength deficits and to ensure patient is comfortable with his HEP. PROBLEM LIST (Impacting functional limitations):  1. Decreased Strength  2. Decreased ADL/Functional Activities  3. Decreased Balance  4. Decreased Activity Tolerance  5. Edema/Girth  6. Decreased Evans with Home Exercise Program INTERVENTIONS PLANNED: (Treatment may consist of any combination of the following)  1.  Balance Exercise  2. Cold  3. Home Exercise Program (HEP)  4. Manual Therapy  5. Neuromuscular Re-education/Strengthening  6. Range of Motion (ROM)  7. Therapeutic Activites  8. Therapeutic Exercise/Strengthening     GOALS: (Goals have been discussed and agreed upon with patient.)  Discharge Goals: Time Frame: 12/23/19 - 2/21/20  1. Patient will be independent with home exercise program without assistance from therapist. Maldonado Ramos  2. Patient will report 60/80 LEFS score to demonstrate improved functional capacity. PROGRESSING WELL  3. Patient will demonstrate +4 degrees of active knee extension to normalize his gait without an assistive device. PROGRESSING WELL  4. Patient will perform 10 sit to stands without UE support to demonstrate improved functional mobility. MET  5. Patient will demonstrate 125 degrees of passive knee flexion to allow him to return to light squatting activities around the house. MET      OUTCOME MEASURE:   Tool Used: Lower Extremity Functional Scale (LEFS)  Score:  Initial: 24/80 (12/23/19) Most Recent: 54/80 (Date: 1/22/20 )   Interpretation of Score: 20 questions each scored on a 5 point scale with 0 representing \"extreme difficulty or unable to perform\" and 4 representing \"no difficulty\". The lower the score, the greater the functional disability. 80/80 represents no disability. Minimal detectable change is 9 points. MEDICAL NECESSITY:   · Patient is expected to demonstrate progress in strength, range of motion, balance and coordination to increase independence with standing, walking and negotiating stairs. REASON FOR SERVICES/OTHER COMMENTS:  · Patient continues to require present interventions due to patient's inability to fully participate in all ADL, functional and recreational activities.   Total Duration:  PT Patient Time In/Time Out  Time In: 1435  Time Out: 1530    Rehabilitation Potential For Stated Goals: Excellent  Regarding Patience Shah therapy, I certify that the treatment plan above will be carried out by a therapist or under their direction. Thank you for this referral,  Celina Matute     Referring Physician Signature: Venkata Herbert,*                 PAIN/SUBJECTIVE:   Initial: Pain Intensity 1: 2/10 Post Session:  6/10   HISTORY:   History of Injury/Illness (Reason for Referral):  Katie Strickland presents to physical therapy today with complaints of L knee pain after undergoing a TKA on 12/3/19. He has been dealing with knee OA for a number of years and had his R knee replaced in August and decided to go ahead and have his L replaced. He feels that he is progressing faster with his L knee than he did his R. He continues to have complaints of medial knee pain with prolonged sitting, standing and walking and the inability to negotiate stairs reciprocally yet. He started walking with a straight cane about 1 week ago. He has front steps into his house but no stairs inside. He wants to return to normal activity around his house, driving without knee pain and sleeping through the night. He is a retired  in [employer]. He has had both knees scoped in the past.   Past Medical History/Comorbidities:   Mr. Yanira Lerner  has a past medical history of Arrhythmia (2009-- per pt caused by dilaudid), Arthritis, Diabetes mellitus, type 2 ([address] Elevated cholesterol, GERD (gastroesophageal reflux disease), History of kidney stones (2002), Hypertension, Morbid obesity ([address] PUD (peptic ulcer disease) (2003), Sleep apnea, Testosterone deficiency, and Vitamin D deficiency (06/11/2014). Mr. Yanira Lerner  has a past surgical history that includes hx colonoscopy (07/17/2015); neurological procedure unlisted (3/2011 at [facility]); hx orthopaedic; hx heent (2009); hx gi (early 1990s); and hx knee replacement (Right, 08/27/2019).   Social History/Living Environment:     Lives with wife  Prior Level of Function/Work/Activity:  Has been dealing with knee OA for a number of years limiting his activity level                      Current Medications:       Current Outpatient Medications:     testosterone (ANDROGEL) 20.25 mg/1.25 gram (1.62 %) gel, 41 mg by TransDERmal route daily. Max Daily Amount: 41 mg., Disp: 3 Bottle, Rfl: 5    rosuvastatin (CRESTOR) 40 mg tablet, Take 1 Tab by mouth daily. , Disp: 30 Tab, Rfl: 11    SITagliptin-metFORMIN (JANUMET XR) 50-1,000 mg TM24, 1 p.o. twice daily, Disp: 60 Tab, Rfl: 11    propranolol (INDERAL) 40 mg tablet, Take 1 tablet 2 times a day, Disp: 60 Tab, Rfl: 11    oxyCODONE IR (ROXICODONE) 10 mg tab immediate release tablet, Take 10 mg by mouth every six (6) hours as needed for Pain., Disp: , Rfl:     diphenhydrAMINE-acetaminophen (TYLENOL PM EXTRA STRENGTH)  mg tab, Take 2 Tabs by mouth every four to six (4-6) hours as needed for Pain., Disp: , Rfl:     psyllium husk (METAMUCIL PO), 1 tablespoon mix with 6 oz of water, Disp: , Rfl:     aspirin delayed-release 81 mg tablet, Take 1 Tab by mouth every twelve (12) hours every twelve (12) hours. , Disp: 60 Tab, Rfl: 0    enalapril 10 mg tab 10 mg, hydroCHLOROthiazide 25 mg tab 25 mg, Take 1 Dose by mouth daily. , Disp: , Rfl:     melatonin 10 mg tab, Take 1 Tab by mouth nightly as needed. , Disp: , Rfl:     rosuvastatin (CRESTOR) 40 mg tablet, Take 1 Tab by mouth daily for 30 days. , Disp: 30 Tab, Rfl: 11    fluticasone (FLONASE) 50 mcg/actuation nasal spray, 2 Sprays by Both Nostrils route daily. (Patient taking differently: 2 Sprays by Both Nostrils route daily as needed (sinus infection). ), Disp: 1 Bottle, Rfl: 6   Date Last Reviewed:  1/22/2020   Number of Personal Factors/Comorbidities that affect the Plan of Care: 0: LOW COMPLEXITY   EXAMINATION:   OBSERVATION/SWELLING:      RIGHT LEFT   Joint line 40 cm 41.5 cm   Superior patellar pole 45 cm 48 cm   5cm proximal superior   patellar pole 47 cm 49 cm   Swelling not reassessed on 1/22/20 but improved overall.  Still has some slight joint effusion    PALPATION: no tenderness      ROM: measured in degrees      RIGHT LEFT   Knee extension +8 +1   Knee flexion 135 132 supine with strap   Ankle DF nt nt     STRENGTH: R quad: 60.7 pounds; L: 37.2#                         R hamstrings 47.6#; L 45.2#      FUNCTIONAL MOBILITY:      RIGHT LEFT   Overhead Deep Squat nt nt   Step up normal normal   Step down normal Decreased control   TUG < 14 seconds w/o AD         BALANCE/VESTIBULAR:      RIGHT LEFT   Single leg nt 5-10 seconds   Tandem 30 seconds 30 seconds        FLEXIBILITY:      RIGHT LEFT   Rectus Femoris  normal   Psoas  normal   Adductors     Hamstrings  normal   Gastrocnemius  Slight restriction      JOINT MOBILITY:      RIGHT LEFT   Patellofemoral  normal   Tibiofemoral  nt     TTP: tenderness to palpation  nt: not tested     Body Structures Involved:  1. Nerves  2. Bones  3. Joints  4. Muscles Body Functions Affected:  1. Sensory/Pain  2. Neuromusculoskeletal  3. Movement Related Activities and Participation Affected:  1. Mobility  2.  Self Care   Number of elements (examined above) that affect the Plan of Care: 4+: HIGH COMPLEXITY   CLINICAL PRESENTATION:   Presentation: Stable and uncomplicated: LOW COMPLEXITY   CLINICAL DECISION MAKING:   Use of outcome tool(s) and clinical judgement create a POC that gives a: Clear prediction of patient's progress: LOW COMPLEXITY

## 2020-01-28 ENCOUNTER — HOSPITAL ENCOUNTER (OUTPATIENT)
Dept: PHYSICAL THERAPY | Age: 55
Discharge: HOME | End: 2020-01-28
Payer: COMMERCIAL

## 2020-01-28 PROCEDURE — 97110 THERAPEUTIC EXERCISES: CPT

## 2020-01-28 NOTE — PROGRESS NOTES
Kb Sapp  : 1965  Primary: Haritha [facility]  Secondary:   [place]  at [facility], [address] [facility]  [phone]   [phone]      OUTPATIENT PHYSICAL THERAPY: Daily Treatment Note 2020  Visit Count:  11    ICD-10: Treatment Diagnosis: Left knee pain (25.562); Muscle weakness, generalized (M62.81)  Precautions/Allergies:   Crestor [rosuvastatin]; Dilaudid [hydromorphone (bulk)]; and Tylox [oxycodone-acetaminophen]   TREATMENT PLAN:  Effective Dates: 2019 TO 2020 (60 days). Frequency/Duration: 1-2 times a week for 60 Day(s)    Pre-treatment Symptoms/Complaints: Tightness around patella that makes his leg feel restless at night  Pain: Initial: Pain Intensity 1: 1/10 Post Session:  2/10   Medications Last Reviewed:  2020  Updated Objective Findings:  None Today  TREATMENT:     Therapeutic Exercise: (45 Minutes):  Exercises per grid below to improve mobility, strength, balance and coordination. Required minimal visual, verbal, manual and tactile cues to promote proper body alignment and promote proper body mechanics. Progressed resistance, range, repetitions and complexity of movement as indicated. Date:  2020   Activity/Exercise Parameters   Calf stretch - standing 3x30 seconds   Forward step ups onto blue foam x20   SL balance 3x30 seconds each   Standing hip abduction - blue band 3x10 bilateral   Upright bike 2 miles   Stairs 2 flights   Straight leg raise 3x10 bilateral   Tandem balance 3x30 seconds   Calf raises 2x20   Leg press - 8 cords 3x15    Marching, side steps, tandem walk 1 lap each   BOSU balance 4 minutes   Sit to stand 2x10     Manual Therapy (     ): Manual techniques to facilitate improved motion and decreased pain.  (Used abbreviations: MET - muscle energy technique; PNF - proprioceptive neuromuscular facilitation; NMR - neuromuscular re-education; a/p - anterior to posterior; p/a - posterior to anterior)   · None    Treatment/Session Summary:    · Response to Treatment:  Frandy tolerated treatment well today. Able to increase resistance on shuttle press. He is challanged with balance activities, especially single leg activities.    · Communication/Consultation:  None today  · Equipment provided today:  None today  · Recommendations/Intent for next treatment session: Next visit will focus on swelling management, quad and hip strength, stair climbing    Total Treatment Billable Duration:  45 minutes therex  PT Patient Time In/Time Out  Time In: 1530  Time Out: 1620  Yesenia Vences    Future Appointments   Date Time Provider Yessy Queen   1/30/2020 10:30 AM Andi MICHAUD [facility]   4/15/2020  1:40 PM PETE Cooper PSCD PP   7/2/2020  9:10 AM PST LAB [facility] PST PST   7/9/2020  2:00 PM DO ANGELY Ramirez PST PST   1/4/2021  8:00 AM PST LAB ANGELY PST PST   1/11/2021  1:40 PM DO ANGELY Ramirez PST PST

## 2020-01-30 ENCOUNTER — HOSPITAL ENCOUNTER (OUTPATIENT)
Dept: PHYSICAL THERAPY | Age: 55
Discharge: HOME | End: 2020-01-30
Payer: COMMERCIAL

## 2020-01-30 PROCEDURE — 97110 THERAPEUTIC EXERCISES: CPT

## 2020-01-30 NOTE — PROGRESS NOTES
Mary Mccullough  : 1965  Primary: Monique Roldan [facility]  Secondary:  [address] at [facility], [address]  [phone]   [phone]      OUTPATIENT PHYSICAL THERAPY: Daily Treatment Note 2020  Visit Count:  12    ICD-10: Treatment Diagnosis: Left knee pain (25.562); Muscle weakness, generalized (M62.81)  Precautions/Allergies:   Crestor [rosuvastatin]; Dilaudid [hydromorphone (bulk)]; and Tylox [oxycodone-acetaminophen]   TREATMENT PLAN:  Effective Dates: 2019 TO 2020 (60 days). Frequency/Duration: 1-2 times a week for 60 Day(s)    Pre-treatment Symptoms/Complaints: Was tired after last session  Pain: Initial: Pain Intensity 1: 1/10 Post Session:  1/10   Medications Last Reviewed:  2020  Updated Objective Findings:  See Discharge Summary from today  TREATMENT:     Therapeutic Exercise: (45 Minutes):  Exercises per grid below to improve mobility, strength, balance and coordination. Required minimal visual, verbal, manual and tactile cues to promote proper body alignment and promote proper body mechanics. Progressed resistance, range, repetitions and complexity of movement as indicated. Date:  2020   Activity/Exercise Parameters   Calf stretch - standing 3x30 seconds   Forward step ups onto blue foam and 4\" box x20   SL balance clock taps 3x30 seconds each   Standing hip abduction - blue band --   Upright bike 2 miles   Stairs --   Straight leg raise 3x10 bilateral   Tandem balance --   Calf raises 2x20   Leg press - 8 cords DL and SL 3x15    Marching, side steps, tandem walk, hamstring curls 1 lap each   Clams To fatigue bilaterally   Bridge 3x10     Manual Therapy (     ): Manual techniques to facilitate improved motion and decreased pain.  (Used abbreviations: MET - muscle energy technique; PNF - proprioceptive neuromuscular facilitation; NMR - neuromuscular re-education; a/p - anterior to posterior; p/a - posterior to anterior)   · None    Treatment/Session Summary:    · Response to Treatment:  Benito Nation has progressed well through therapy. He exhibits improved knee AROM and strength/stability. He is able to participate in more ADL and functional activities without difficulty. He feels comfortable with his HEP and therapist encouraged him to stay active.   · Communication/Consultation:  None today  · Equipment provided today:  None today  · Recommendations: Discharge to [facility] home program with no further plans for follow up    Total Treatment Billable Duration:  45 minutes therex  PT Patient Time In/Time Out  Time In: 1025  Time Out: 1120  [facility]    Future Appointments   Date Time Provider Yessy Queen   4/15/2020  1:40 PM Angeles Real NP [facility] PSCD PP   7/2/2020  9:10 AM PST LAB [facility] PST PST   7/9/2020  2:00 PM DO ANGELY Blevins PST PST   1/4/2021  8:00 AM PST LAB [facility] PST PST   1/11/2021  1:40 PM DO ANGELY Blevins PST PST

## 2020-01-30 NOTE — THERAPY DISCHARGE
Leah Delvalle  : 1965  Primary: Carisa Burkett [facility]  Secondary:  [address] at [place] [address] [address], [address]  [phone]   [phone]        OUTPATIENT PHYSICAL THERAPY:Discharge Summary 2020   ICD-10: Treatment Diagnosis: Left knee pain (25.562); Muscle weakness, generalized (M62.81)  Precautions/Allergies:   Crestor [rosuvastatin]; Dilaudid [hydromorphone (bulk)]; and Tylox [oxycodone-acetaminophen]   TREATMENT PLAN:  Effective Dates: 2019 TO 2020 (60 days). Frequency/Duration: 1-2 times a week for 60 Day(s) MEDICAL/REFERRING DIAGNOSIS:  Unilateral primary osteoarthritis, left knee [M17.12]   DATE OF ONSET: 12/3/19  REFERRING PHYSICIAN: Dean Obregon MD Orders: Latoya Mendez and Treat  Return MD Appointment: Early 2020     DISCHARGE (20):  Patrice Baldwin has attended 12 physical therapy visits and exhibits significant improvement in subjective and objective measures. He reports less pain and tightness in his knee with improved ability to walk and ascend stairs without difficulty. He is sleeping better and able to do light activities around the house. He exhibits functional knee AROM and his quad strength is slowly improving. He exhibits approximately half the quad strength on his L knee compared to R but does have symmetrical hamstring strength. He reports that stairs are slowly getting easier. He feels comfortable being discharged to an independent home program today. GOALS: (Goals have been discussed and agreed upon with patient.)  Discharge Goals: Time Frame: 19 - 20  1. Patient will be independent with home exercise program without assistance from therapist. MET  2. Patient will report 60/80 LEFS score to demonstrate improved functional capacity. NEARLY MET  3. Patient will demonstrate +4 degrees of active knee extension to normalize his gait without an assistive device. MET  4.  Patient will perform 10 sit to stands without UE support to demonstrate improved functional mobility. MET  5. Patient will demonstrate 125 degrees of passive knee flexion to allow him to return to light squatting activities around the house. MET      OUTCOME MEASURE:   Tool Used: Lower Extremity Functional Scale (LEFS)  Score:  Initial: 24/80 (12/23/19) 54/80 (Date: 1/22/20 ) Discharge 58/80 (1/30/20)   Interpretation of Score: 20 questions each scored on a 5 point scale with 0 representing \"extreme difficulty or unable to perform\" and 4 representing \"no difficulty\". The lower the score, the greater the functional disability. 80/80 represents no disability. Minimal detectable change is 9 points. Referring Physician Signature: Vianney Ray,* No Signature is Required for this note. PAIN/SUBJECTIVE:   Initial: Pain Intensity 1: 1/10 Post Session:  6/10   HISTORY:   History of Injury/Illness (Reason for Referral):  Brigette León presents to physical therapy today with complaints of L knee pain after undergoing a TKA on 12/3/19. He has been dealing with knee OA for a number of years and had his R knee replaced in August and decided to go ahead and have his L replaced. He feels that he is progressing faster with his L knee than he did his R. He continues to have complaints of medial knee pain with prolonged sitting, standing and walking and the inability to negotiate stairs reciprocally yet. He started walking with a straight cane about 1 week ago. He has front steps into his house but no stairs inside. He wants to return to normal activity around his house, driving without knee pain and sleeping through the night. He is a retired  in [place].  He has had both knees scoped in the past.   Past Medical History/Comorbidities:   Mr. Luis Armando Llamas  has a past medical history of Arrhythmia (2009-- per pt caused by dilaudid), Arthritis, Diabetes mellitus, type 2 ([address] Elevated cholesterol, GERD (gastroesophageal reflux disease), History of kidney stones (2002), Hypertension, Morbid obesity ([address] PUD (peptic ulcer disease) (2003), Sleep apnea, Testosterone deficiency, and Vitamin D deficiency (06/11/2014). Jorden [facility]  has a past surgical history that includes hx colonoscopy (07/17/2015); neurological procedure unlisted (3/2011 at [facility]); hx orthopaedic; hx heent (2009); hx gi (early 1990s); and hx knee replacement (Right, 08/27/2019). Social History/Living Environment:     Lives with wife  Prior Level of Function/Work/Activity:  Has been dealing with knee OA for a number of years limiting his activity level                      Current Medications:       Current Outpatient Medications:     testosterone (ANDROGEL) 20.25 mg/1.25 gram (1.62 %) gel, 41 mg by TransDERmal route daily. Max Daily Amount: 41 mg., Disp: 3 Bottle, Rfl: 5    rosuvastatin (CRESTOR) 40 mg tablet, Take 1 Tab by mouth daily. , Disp: 30 Tab, Rfl: 11    SITagliptin-metFORMIN (JANUMET XR) 50-1,000 mg TM24, 1 p.o. twice daily, Disp: 60 Tab, Rfl: 11    propranolol (INDERAL) 40 mg tablet, Take 1 tablet 2 times a day, Disp: 60 Tab, Rfl: 11    oxyCODONE IR (ROXICODONE) 10 mg tab immediate release tablet, Take 10 mg by mouth every six (6) hours as needed for Pain., Disp: , Rfl:     diphenhydrAMINE-acetaminophen (TYLENOL PM EXTRA STRENGTH)  mg tab, Take 2 Tabs by mouth every four to six (4-6) hours as needed for Pain., Disp: , Rfl:     psyllium husk (METAMUCIL PO), 1 tablespoon mix with 6 oz of water, Disp: , Rfl:     aspirin delayed-release 81 mg tablet, Take 1 Tab by mouth every twelve (12) hours every twelve (12) hours. , Disp: 60 Tab, Rfl: 0    enalapril 10 mg tab 10 mg, hydroCHLOROthiazide 25 mg tab 25 mg, Take 1 Dose by mouth daily. , Disp: , Rfl:     melatonin 10 mg tab, Take 1 Tab by mouth nightly as needed. , Disp: , Rfl:     rosuvastatin (CRESTOR) 40 mg tablet, Take 1 Tab by mouth daily for 30 days. , Disp: 30 Tab, Rfl: 11    fluticasone (FLONASE) 50 mcg/actuation nasal spray, 2 Sprays by Both Nostrils route daily. (Patient taking differently: 2 Sprays by Both Nostrils route daily as needed (sinus infection). ), Disp: 1 Bottle, Rfl: 6   Date Last Reviewed:  1/30/2020   Number of Personal Factors/Comorbidities that affect the Plan of Care: 0: LOW COMPLEXITY   EXAMINATION:   OBSERVATION/SWELLING: no swelling noted    PALPATION: no tenderness      ROM: measured in degrees      RIGHT LEFT   Knee extension +8 +4   Knee flexion 135 132 supine with strap   Ankle DF nt nt     STRENGTH: R quad: 60.7 pounds; L: 37.2#                         R hamstrings 47.6#; L 45.2#      FUNCTIONAL MOBILITY:      RIGHT LEFT   Overhead Deep Squat nt nt   Step up normal normal   Step down normal Decreased control   TUG < 14 seconds w/o AD         BALANCE/VESTIBULAR:      RIGHT LEFT   Single leg nt 5-10 seconds   Tandem 30 seconds 30 seconds        FLEXIBILITY:      RIGHT LEFT   Rectus Femoris  normal   Psoas  normal   Adductors     Hamstrings  normal   Gastrocnemius  Slight restriction      JOINT MOBILITY:      RIGHT LEFT   Patellofemoral  normal   Tibiofemoral  nt     TTP: tenderness to palpation  nt: not tested

## 2020-03-14 PROBLEM — M17.11 OSTEOARTHRITIS OF RIGHT KNEE: Status: ACTIVE | Noted: 2020-03-14

## 2021-08-18 PROBLEM — R07.89 ATYPICAL CHEST PAIN: Status: ACTIVE | Noted: 2021-08-18

## 2021-08-18 PROBLEM — R06.09 DOE (DYSPNEA ON EXERTION): Status: ACTIVE | Noted: 2021-08-18

## 2021-08-18 PROBLEM — R00.2 PALPITATIONS: Status: ACTIVE | Noted: 2021-08-18

## 2022-03-18 PROBLEM — R07.89 ATYPICAL CHEST PAIN: Status: ACTIVE | Noted: 2021-08-18

## 2022-03-19 PROBLEM — M17.11 ARTHRITIS OF RIGHT KNEE: Status: ACTIVE | Noted: 2019-08-27

## 2022-03-19 PROBLEM — R06.09 DOE (DYSPNEA ON EXERTION): Status: ACTIVE | Noted: 2021-08-18

## 2022-03-19 PROBLEM — R00.2 PALPITATIONS: Status: ACTIVE | Noted: 2021-08-18

## 2022-03-19 PROBLEM — M17.12 ARTHRITIS OF LEFT KNEE: Status: ACTIVE | Noted: 2019-12-03

## 2022-03-19 PROBLEM — R06.83 SNORING: Status: ACTIVE | Noted: 2019-08-19

## 2022-03-19 PROBLEM — E11.65 TYPE 2 DIABETES MELLITUS WITH HYPERGLYCEMIA, WITHOUT LONG-TERM CURRENT USE OF INSULIN (HCC): Status: ACTIVE | Noted: 2017-02-27

## 2022-03-20 PROBLEM — M17.11 OSTEOARTHRITIS OF RIGHT KNEE: Status: ACTIVE | Noted: 2020-03-14

## 2022-08-03 ENCOUNTER — NURSE ONLY (OUTPATIENT)
Dept: FAMILY MEDICINE CLINIC | Facility: CLINIC | Age: 57
End: 2022-08-03
Payer: COMMERCIAL

## 2022-08-03 DIAGNOSIS — E55.9 VITAMIN D DEFICIENCY: ICD-10-CM

## 2022-08-03 DIAGNOSIS — Z12.5 SPECIAL SCREENING FOR MALIGNANT NEOPLASM OF PROSTATE: ICD-10-CM

## 2022-08-03 DIAGNOSIS — E78.00 PURE HYPERCHOLESTEROLEMIA: ICD-10-CM

## 2022-08-03 DIAGNOSIS — E34.9 TESTOSTERONE DEFICIENCY: ICD-10-CM

## 2022-08-03 DIAGNOSIS — E11.65 TYPE 2 DIABETES MELLITUS WITH HYPERGLYCEMIA, WITHOUT LONG-TERM CURRENT USE OF INSULIN (HCC): ICD-10-CM

## 2022-08-03 DIAGNOSIS — Z00.00 LABORATORY EXAMINATION ORDERED AS PART OF A ROUTINE GENERAL MEDICAL EXAMINATION: Primary | ICD-10-CM

## 2022-08-03 DIAGNOSIS — I10 PRIMARY HYPERTENSION: ICD-10-CM

## 2022-08-03 LAB
25(OH)D3 SERPL-MCNC: 33 NG/ML (ref 30–100)
ALBUMIN SERPL-MCNC: 4 G/DL (ref 3.5–5)
ALBUMIN/GLOB SERPL: 1.1 {RATIO} (ref 1.2–3.5)
ALP SERPL-CCNC: 85 U/L (ref 50–136)
ALT SERPL-CCNC: 49 U/L (ref 12–65)
ANION GAP SERPL CALC-SCNC: 8 MMOL/L (ref 7–16)
APPEARANCE UR: CLEAR
AST SERPL-CCNC: 33 U/L (ref 15–37)
BASOPHILS # BLD: 0.1 K/UL (ref 0–0.2)
BASOPHILS NFR BLD: 1 % (ref 0–2)
BILIRUB SERPL-MCNC: 0.4 MG/DL (ref 0.2–1.1)
BILIRUB UR QL: NEGATIVE
BUN SERPL-MCNC: 14 MG/DL (ref 6–23)
CALCIUM SERPL-MCNC: 9.7 MG/DL (ref 8.3–10.4)
CHLORIDE SERPL-SCNC: 100 MMOL/L (ref 98–107)
CHOLEST SERPL-MCNC: 100 MG/DL
CO2 SERPL-SCNC: 27 MMOL/L (ref 21–32)
COLOR UR: ABNORMAL
CREAT SERPL-MCNC: 1 MG/DL (ref 0.8–1.5)
DIFFERENTIAL METHOD BLD: ABNORMAL
EOSINOPHIL # BLD: 0.2 K/UL (ref 0–0.8)
EOSINOPHIL NFR BLD: 2 % (ref 0.5–7.8)
ERYTHROCYTE [DISTWIDTH] IN BLOOD BY AUTOMATED COUNT: 13.9 % (ref 11.9–14.6)
EST. AVERAGE GLUCOSE BLD GHB EST-MCNC: 186 MG/DL
GLOBULIN SER CALC-MCNC: 3.6 G/DL (ref 2.3–3.5)
GLUCOSE SERPL-MCNC: 152 MG/DL (ref 65–100)
GLUCOSE UR STRIP.AUTO-MCNC: NEGATIVE MG/DL
HBA1C MFR BLD: 8.1 % (ref 4.8–5.6)
HCT VFR BLD AUTO: 40.7 % (ref 41.1–50.3)
HDLC SERPL-MCNC: 32 MG/DL (ref 40–60)
HDLC SERPL: 3.1 {RATIO}
HGB BLD-MCNC: 12.9 G/DL (ref 13.6–17.2)
HGB UR QL STRIP: NEGATIVE
HIV 1+2 AB+HIV1 P24 AG SERPL QL IA: NONREACTIVE
HIV 1/2 RESULT COMMENT: NORMAL
IMM GRANULOCYTES # BLD AUTO: 0 K/UL (ref 0–0.5)
IMM GRANULOCYTES NFR BLD AUTO: 0 % (ref 0–5)
KETONES UR QL STRIP.AUTO: ABNORMAL MG/DL
LDLC SERPL CALC-MCNC: 28.6 MG/DL
LEUKOCYTE ESTERASE UR QL STRIP.AUTO: NEGATIVE
LYMPHOCYTES # BLD: 2 K/UL (ref 0.5–4.6)
LYMPHOCYTES NFR BLD: 27 % (ref 13–44)
MCH RBC QN AUTO: 27.9 PG (ref 26.1–32.9)
MCHC RBC AUTO-ENTMCNC: 31.7 G/DL (ref 31.4–35)
MCV RBC AUTO: 88.1 FL (ref 79.6–97.8)
MICROALBUMIN URINE, POC: 50 MG/L
MONOCYTES # BLD: 0.5 K/UL (ref 0.1–1.3)
MONOCYTES NFR BLD: 6 % (ref 4–12)
NEUTS SEG # BLD: 4.7 K/UL (ref 1.7–8.2)
NEUTS SEG NFR BLD: 64 % (ref 43–78)
NITRITE UR QL STRIP.AUTO: NEGATIVE
NRBC # BLD: 0 K/UL (ref 0–0.2)
PH UR STRIP: 5.5 [PH] (ref 5–9)
PLATELET # BLD AUTO: 328 K/UL (ref 150–450)
PMV BLD AUTO: 10 FL (ref 9.4–12.3)
POTASSIUM SERPL-SCNC: 4.2 MMOL/L (ref 3.5–5.1)
PROT SERPL-MCNC: 7.6 G/DL (ref 6.3–8.2)
PROT UR STRIP-MCNC: NEGATIVE MG/DL
PSA SERPL-MCNC: 0.8 NG/ML
RBC # BLD AUTO: 4.62 M/UL (ref 4.23–5.6)
SODIUM SERPL-SCNC: 135 MMOL/L (ref 138–145)
SP GR UR REFRACTOMETRY: 1.02 (ref 1–1.02)
TRIGL SERPL-MCNC: 197 MG/DL (ref 35–150)
TSH, 3RD GENERATION: 2.44 UIU/ML (ref 0.36–3.74)
UROBILINOGEN UR QL STRIP.AUTO: 0.2 EU/DL (ref 0.2–1)
VLDLC SERPL CALC-MCNC: 39.4 MG/DL (ref 6–23)
WBC # BLD AUTO: 7.4 K/UL (ref 4.3–11.1)

## 2022-08-03 PROCEDURE — 82044 UR ALBUMIN SEMIQUANTITATIVE: CPT | Performed by: FAMILY MEDICINE

## 2022-08-07 LAB
TESTOST FREE SERPL-MCNC: 5.8 PG/ML (ref 7.2–24)
TESTOST SERPL-MCNC: 105 NG/DL (ref 264–916)

## 2022-08-10 ENCOUNTER — OFFICE VISIT (OUTPATIENT)
Dept: FAMILY MEDICINE CLINIC | Facility: CLINIC | Age: 57
End: 2022-08-10
Payer: COMMERCIAL

## 2022-08-10 VITALS
WEIGHT: 277 LBS | HEIGHT: 69 IN | DIASTOLIC BLOOD PRESSURE: 80 MMHG | SYSTOLIC BLOOD PRESSURE: 124 MMHG | BODY MASS INDEX: 41.03 KG/M2

## 2022-08-10 DIAGNOSIS — E78.2 MIXED HYPERLIPIDEMIA: ICD-10-CM

## 2022-08-10 DIAGNOSIS — I10 PRIMARY HYPERTENSION: ICD-10-CM

## 2022-08-10 DIAGNOSIS — Z13.31 SCREENING FOR DEPRESSION: ICD-10-CM

## 2022-08-10 DIAGNOSIS — E66.01 MORBID OBESITY (HCC): ICD-10-CM

## 2022-08-10 DIAGNOSIS — N52.9 ERECTILE DYSFUNCTION, UNSPECIFIED ERECTILE DYSFUNCTION TYPE: ICD-10-CM

## 2022-08-10 DIAGNOSIS — E55.9 VITAMIN D DEFICIENCY: ICD-10-CM

## 2022-08-10 DIAGNOSIS — E11.65 TYPE 2 DIABETES MELLITUS WITH HYPERGLYCEMIA, WITHOUT LONG-TERM CURRENT USE OF INSULIN (HCC): ICD-10-CM

## 2022-08-10 DIAGNOSIS — Z91.199 NONCOMPLIANCE: ICD-10-CM

## 2022-08-10 DIAGNOSIS — Z00.00 ROUTINE GENERAL MEDICAL EXAMINATION AT A HEALTH CARE FACILITY: Primary | ICD-10-CM

## 2022-08-10 DIAGNOSIS — E34.9 TESTOSTERONE DEFICIENCY: ICD-10-CM

## 2022-08-10 PROCEDURE — 99396 PREV VISIT EST AGE 40-64: CPT | Performed by: FAMILY MEDICINE

## 2022-08-10 RX ORDER — PIOGLITAZONEHYDROCHLORIDE 30 MG/1
30 TABLET ORAL DAILY
Qty: 90 TABLET | Refills: 3 | Status: SHIPPED | OUTPATIENT
Start: 2022-08-10

## 2022-08-10 RX ORDER — SITAGLIPTIN AND METFORMIN HYDROCHLORIDE 1000; 50 MG/1; MG/1
1 TABLET, FILM COATED, EXTENDED RELEASE ORAL 2 TIMES DAILY
Qty: 180 TABLET | Refills: 3 | Status: SHIPPED | OUTPATIENT
Start: 2022-08-10 | End: 2022-10-14

## 2022-08-10 RX ORDER — CHLORTHALIDONE 25 MG/1
25 TABLET ORAL DAILY
Qty: 90 TABLET | Refills: 3 | Status: SHIPPED | OUTPATIENT
Start: 2022-08-10

## 2022-08-10 RX ORDER — ROSUVASTATIN CALCIUM 40 MG/1
40 TABLET, COATED ORAL DAILY
Qty: 90 TABLET | Refills: 3 | Status: SHIPPED | OUTPATIENT
Start: 2022-08-10

## 2022-08-10 RX ORDER — PROPRANOLOL HYDROCHLORIDE 40 MG/1
40 TABLET ORAL 2 TIMES DAILY
Qty: 180 TABLET | Refills: 3 | Status: SHIPPED | OUTPATIENT
Start: 2022-08-10

## 2022-08-10 RX ORDER — SILDENAFIL 50 MG/1
50 TABLET, FILM COATED ORAL PRN
Qty: 90 TABLET | Refills: 1 | Status: SHIPPED | OUTPATIENT
Start: 2022-08-10

## 2022-08-10 RX ORDER — TESTOSTERONE 16.2 MG/G
4 GEL TRANSDERMAL DAILY
Qty: 2 EACH | Refills: 5 | Status: SHIPPED | OUTPATIENT
Start: 2022-08-10 | End: 2022-10-14

## 2022-08-10 RX ORDER — ENALAPRIL MALEATE 10 MG/1
10 TABLET ORAL DAILY
Qty: 90 TABLET | Refills: 3 | Status: SHIPPED | OUTPATIENT
Start: 2022-08-10

## 2022-08-10 ASSESSMENT — PATIENT HEALTH QUESTIONNAIRE - PHQ9
1. LITTLE INTEREST OR PLEASURE IN DOING THINGS: 0
SUM OF ALL RESPONSES TO PHQ QUESTIONS 1-9: 0
SUM OF ALL RESPONSES TO PHQ QUESTIONS 1-9: 0
2. FEELING DOWN, DEPRESSED OR HOPELESS: 0
SUM OF ALL RESPONSES TO PHQ9 QUESTIONS 1 & 2: 0
SUM OF ALL RESPONSES TO PHQ QUESTIONS 1-9: 0
SUM OF ALL RESPONSES TO PHQ QUESTIONS 1-9: 0

## 2022-08-10 ASSESSMENT — ENCOUNTER SYMPTOMS
CONSTIPATION: 0
VOMITING: 0
WHEEZING: 0
SHORTNESS OF BREATH: 0
SORE THROAT: 0
DIARRHEA: 0
ABDOMINAL PAIN: 0
NAUSEA: 0
COUGH: 0

## 2022-08-10 NOTE — PROGRESS NOTES
PROGRESS NOTE    SUBJECTIVE:   Mary German is a 61 y.o. male seen for a follow up visit regarding the following chief complaint:     Chief Complaint   Patient presents with    Annual Exam    Discuss Labs           HPI patient presents the office today for complete physical without complaints      Past Medical History, Past Surgical History, Family history, Social History, and Medications were all reviewed with the patient today and updated as necessary. Current Outpatient Medications   Medication Sig Dispense Refill    chlorthalidone (HYGROTON) 25 MG tablet Take 1 tablet by mouth in the morning. 90 tablet 3    enalapril (VASOTEC) 10 MG tablet Take 1 tablet by mouth in the morning. 90 tablet 3    pioglitazone (ACTOS) 30 MG tablet Take 1 tablet by mouth in the morning. 90 tablet 3    propranolol (INDERAL) 40 MG tablet Take 1 tablet by mouth in the morning and 1 tablet before bedtime. Take 1 tablet 2 times a day. 180 tablet 3    rosuvastatin (CRESTOR) 40 MG tablet Take 1 tablet by mouth in the morning. 90 tablet 3    sildenafil (VIAGRA) 50 MG tablet Take 1 tablet by mouth as needed for Erectile Dysfunction 90 tablet 1    SITagliptin-metFORMIN (JANUMET XR)  MG TB24 per extended release tablet Take 1 tablet by mouth in the morning and at bedtime 180 tablet 3    Testosterone (ANDROGEL) 20.25 MG/ACT (1.62%) GEL gel Place 4 actuation onto the skin in the morning for 30 days. 2 each 5    aspirin 81 MG EC tablet Take 81 mg by mouth every 12 hours      diphenhydrAMINE-APAP, sleep, (TYLENOL PM EXTRA STRENGTH)  MG tablet Take 2 tablets by mouth      fluticasone (FLONASE) 50 MCG/ACT nasal spray 2 sprays by Nasal route daily      Melatonin 10 MG TABS Take 1 tablet by mouth      indomethacin (INDOCIN) 50 MG capsule Take 50 mg by mouth 3 times daily       No current facility-administered medications for this visit.      Allergies   Allergen Reactions    Hydromorphone Other (See Comments)     Caused pt to go Sister         breast    Heart Disease Sister     Stroke Sister     Malig Hypertherm Neg Hx     Pseudochol. Deficiency Neg Hx     Delayed Awakening Neg Hx     Post-op Nausea/Vomiting Neg Hx     Emergence Delirium Neg Hx     Post-op Cognitive Dysfunction Neg Hx     Colon Cancer Neg Hx     Other Neg Hx     Alzheimer's Disease Father      Social History     Tobacco Use    Smoking status: Never     Passive exposure: Never    Smokeless tobacco: Never   Substance Use Topics    Alcohol use: No         Review of Systems   Constitutional:  Negative for chills and fever. HENT:  Negative for sore throat. Eyes:  Negative for visual disturbance. Respiratory:  Negative for cough, shortness of breath and wheezing. Cardiovascular:  Negative for chest pain and palpitations. Gastrointestinal:  Negative for abdominal pain, constipation, diarrhea, nausea and vomiting. Endocrine: Negative for cold intolerance and heat intolerance. Genitourinary:  Negative for decreased urine volume, dysuria, penile discharge and testicular pain. Musculoskeletal:  Negative for arthralgias and myalgias. Skin:  Negative for rash. Neurological:  Negative for weakness and light-headedness. Psychiatric/Behavioral: Negative. OBJECTIVE:  /80 (Site: Left Upper Arm, Position: Sitting, Cuff Size: Large Adult)   Ht 5' 9\" (1.753 m)   Wt 277 lb (125.6 kg)   BMI 40.91 kg/m²      Physical Exam  Vitals and nursing note reviewed. Constitutional:       General: He is not in acute distress. Appearance: Normal appearance. He is obese. HENT:      Head: Normocephalic and atraumatic. Right Ear: Tympanic membrane normal.      Left Ear: Tympanic membrane normal.      Nose: Nose normal.      Mouth/Throat:      Mouth: Mucous membranes are moist.      Pharynx: No oropharyngeal exudate or posterior oropharyngeal erythema. Eyes:      Extraocular Movements: Extraocular movements intact.       Conjunctiva/sclera: Conjunctivae Vitamin D 25 Hydroxy    Collection Time: 08/03/22 10:08 AM   Result Value Ref Range    Vit D, 25-Hydroxy 33.0 30.0 - 100.0 ng/mL   TSH    Collection Time: 08/03/22 10:08 AM   Result Value Ref Range    TSH, 3RD GENERATION 2.440 0.358 - 3.740 uIU/mL   Hemoglobin A1C    Collection Time: 08/03/22 10:08 AM   Result Value Ref Range    Hemoglobin A1C 8.1 (H) 4.8 - 5.6 %    eAG 186 mg/dL   Testosterone, free, total    Collection Time: 08/03/22 10:08 AM   Result Value Ref Range    Testosterone 105 (L) 264 - 916 ng/dL    Testosterone, Free 5.8 (L) 7.2 - 24.0 pg/mL   PSA Screening    Collection Time: 08/03/22 10:08 AM   Result Value Ref Range    PSA 0.8 <4.0 ng/mL   Lipid Panel    Collection Time: 08/03/22 10:08 AM   Result Value Ref Range    Cholesterol, Total 100 <200 MG/DL    Triglycerides 197 (H) 35 - 150 MG/DL    HDL 32 (L) 40 - 60 MG/DL    LDL Calculated 28.6 <100 MG/DL    VLDL Cholesterol Calculated 39.4 (H) 6.0 - 23.0 MG/DL    Chol/HDL Ratio 3.1     CBC with Auto Differential    Collection Time: 08/03/22 10:08 AM   Result Value Ref Range    WBC 7.4 4.3 - 11.1 K/uL    RBC 4.62 4.23 - 5.6 M/uL    Hemoglobin 12.9 (L) 13.6 - 17.2 g/dL    Hematocrit 40.7 (L) 41.1 - 50.3 %    MCV 88.1 79.6 - 97.8 FL    MCH 27.9 26.1 - 32.9 PG    MCHC 31.7 31.4 - 35.0 g/dL    RDW 13.9 11.9 - 14.6 %    Platelets 868 571 - 386 K/uL    MPV 10.0 9.4 - 12.3 FL    nRBC 0.00 0.0 - 0.2 K/uL    Differential Type AUTOMATED      Seg Neutrophils 64 43 - 78 %    Lymphocytes 27 13 - 44 %    Monocytes 6 4.0 - 12.0 %    Eosinophils % 2 0.5 - 7.8 %    Basophils 1 0.0 - 2.0 %    Immature Granulocytes 0 0.0 - 5.0 %    Segs Absolute 4.7 1.7 - 8.2 K/UL    Absolute Lymph # 2.0 0.5 - 4.6 K/UL    Absolute Mono # 0.5 0.1 - 1.3 K/UL    Absolute Eos # 0.2 0.0 - 0.8 K/UL    Basophils Absolute 0.1 0.0 - 0.2 K/UL    Absolute Immature Granulocyte 0.0 0.0 - 0.5 K/UL   Comprehensive Metabolic Panel    Collection Time: 08/03/22 10:08 AM   Result Value Ref Range    Sodium 135 (L) 138 - 145 mmol/L    Potassium 4.2 3.5 - 5.1 mmol/L    Chloride 100 98 - 107 mmol/L    CO2 27 21 - 32 mmol/L    Anion Gap 8 7 - 16 mmol/L    Glucose 152 (H) 65 - 100 mg/dL    BUN 14 6 - 23 MG/DL    Creatinine 1.00 0.8 - 1.5 MG/DL    GFR African American >60 >60 ml/min/1.73m2    GFR Non- >60 >60 ml/min/1.73m2    Calcium 9.7 8.3 - 10.4 MG/DL    Total Bilirubin 0.4 0.2 - 1.1 MG/DL    ALT 49 12 - 65 U/L    AST 33 15 - 37 U/L    Alk Phosphatase 85 50 - 136 U/L    Total Protein 7.6 6.3 - 8.2 g/dL    Albumin 4.0 3.5 - 5.0 g/dL    Globulin 3.6 (H) 2.3 - 3.5 g/dL    Albumin/Globulin Ratio 1.1 (L) 1.2 - 3.5     HIV 1/2 Ag/Ab, 4TH Generation,W Rflx Confirm    Collection Time: 08/03/22 10:08 AM   Result Value Ref Range    HIV 1/2 Interp NONREACTIVE NONREACTIVE      HIV 1/2 Result Comment SEE NOTE         ASSESSMENT and PLAN    Visit Diagnoses and Associated Orders       Routine general medical examination at a health care facility    -  Primary         Noncompliance             Primary hypertension        chlorthalidone (HYGROTON) 25 MG tablet [1661]      enalapril (VASOTEC) 10 MG tablet [2424]      propranolol (INDERAL) 40 MG tablet [8169]           Type 2 diabetes mellitus with hyperglycemia, without long-term current use of insulin (HCC)        pioglitazone (ACTOS) 30 MG tablet [06791]      SITagliptin-metFORMIN (JANUMET XR)  MG TB24 per extended release tablet [548254]      Hemoglobin A1C [45598 Custom]   - Future Order    External Referral to Ophthalmology [[id number] Custom]           Testosterone deficiency        Testosterone (ANDROGEL) 20.25 MG/ACT (1.62%) GEL gel [580324]      Testosterone, Free [[id number] Custom]   - Future Order    Testosterone Total Only, Male [[id number] Custom]   - Future Order         Vitamin D deficiency             Morbid obesity ([address]             Mixed hyperlipidemia        rosuvastatin (CRESTOR) 40 MG tablet [36345]           Screening for depression             Erectile dysfunction, unspecified erectile dysfunction type        sildenafil (VIAGRA) 50 MG tablet [[id number]]                       Diagnosis Orders   1. Routine general medical examination at a health care facility        2. Noncompliance        3. Primary hypertension  chlorthalidone (HYGROTON) 25 MG tablet    enalapril (VASOTEC) 10 MG tablet    propranolol (INDERAL) 40 MG tablet      4. Type 2 diabetes mellitus with hyperglycemia, without long-term current use of insulin ([facility])  pioglitazone (ACTOS) 30 MG tablet    SITagliptin-metFORMIN (JANUMET XR)  MG TB24 per extended release tablet    Hemoglobin A1C    External Referral to Ophthalmology      5. Testosterone deficiency  Testosterone (ANDROGEL) 20.25 MG/ACT (1.62%) GEL gel    Testosterone, Free    Testosterone Total Only, Male      6. Vitamin D deficiency        7. Morbid obesity ([address]        8. Mixed hyperlipidemia  rosuvastatin (CRESTOR) 40 MG tablet      9. Screening for depression        10. Erectile dysfunction, unspecified erectile dysfunction type  sildenafil (VIAGRA) 50 MG tablet      , Alisia Buchananis was seen today for annual exam and discuss labs. Diagnoses and all orders for this visit:    Routine general medical examination at a health care facility    Noncompliance    Primary hypertension  -     chlorthalidone (HYGROTON) 25 MG tablet; Take 1 tablet by mouth in the morning.  -     enalapril (VASOTEC) 10 MG tablet; Take 1 tablet by mouth in the morning.  -     propranolol (INDERAL) 40 MG tablet; Take 1 tablet by mouth in the morning and 1 tablet before bedtime. Take 1 tablet 2 times a day. Type 2 diabetes mellitus with hyperglycemia, without long-term current use of insulin ([facility])  -     pioglitazone (ACTOS) 30 MG tablet; Take 1 tablet by mouth in the morning.  -     SITagliptin-metFORMIN (JANUMET XR)  MG TB24 per extended release tablet;  Take 1 tablet by mouth in the morning and at bedtime  -     Hemoglobin A1C; Future  -     External Referral to Ophthalmology    Testosterone deficiency  -     Testosterone (ANDROGEL) 20.25 MG/ACT (1.62%) GEL gel; Place 4 actuation onto the skin in the morning for 30 days. -     Testosterone, Free; Future  -     Testosterone Total Only, Male; Future    Vitamin D deficiency    Morbid obesity (HCC)    Mixed hyperlipidemia  -     rosuvastatin (CRESTOR) 40 MG tablet; Take 1 tablet by mouth in the morning. Screening for depression    Erectile dysfunction, unspecified erectile dysfunction type  -     sildenafil (VIAGRA) 50 MG tablet; Take 1 tablet by mouth as needed for Erectile Dysfunction  , Had a long lengthy discussion about his lab work patient states he thinks his medicine is not working but I told him its more to do with his diet morbid obesity and lack of exercise he is going to try harder discussed options of increasing medications GERD continue with his present medication recheck labs in 3 to 4 months risk benefits and options discussed. I have spent a total of 8-15 minutes assessing, reviewing, and discussing the depression screening with patient in office today.

## 2022-10-14 ENCOUNTER — OFFICE VISIT (OUTPATIENT)
Dept: FAMILY MEDICINE CLINIC | Facility: CLINIC | Age: 57
End: 2022-10-14
Payer: COMMERCIAL

## 2022-10-14 ENCOUNTER — NURSE TRIAGE (OUTPATIENT)
Dept: OTHER | Facility: CLINIC | Age: 57
End: 2022-10-14

## 2022-10-14 ENCOUNTER — HOSPITAL ENCOUNTER (OUTPATIENT)
Dept: GENERAL RADIOLOGY | Age: 57
Discharge: HOME | End: 2022-10-17

## 2022-10-14 VITALS
SYSTOLIC BLOOD PRESSURE: 130 MMHG | BODY MASS INDEX: 41.32 KG/M2 | HEIGHT: 69 IN | DIASTOLIC BLOOD PRESSURE: 80 MMHG | WEIGHT: 279 LBS

## 2022-10-14 DIAGNOSIS — S06.0XAA CONCUSSION WITH UNKNOWN LOSS OF CONSCIOUSNESS STATUS, INITIAL ENCOUNTER: ICD-10-CM

## 2022-10-14 DIAGNOSIS — M54.2 NECK PAIN: ICD-10-CM

## 2022-10-14 DIAGNOSIS — H53.8 BLURRED VISION, LEFT EYE: ICD-10-CM

## 2022-10-14 DIAGNOSIS — M54.2 NECK PAIN: Primary | ICD-10-CM

## 2022-10-14 PROCEDURE — 99214 OFFICE O/P EST MOD 30 MIN: CPT | Performed by: FAMILY MEDICINE

## 2022-10-14 RX ORDER — CYCLOBENZAPRINE HCL 10 MG
10 TABLET ORAL NIGHTLY PRN
Qty: 30 TABLET | Refills: 0 | Status: SHIPPED | OUTPATIENT
Start: 2022-10-14

## 2022-10-14 RX ORDER — NAPROXEN 500 MG/1
500 TABLET ORAL 2 TIMES DAILY PRN
Qty: 60 TABLET | Refills: 0 | Status: SHIPPED | OUTPATIENT
Start: 2022-10-14

## 2022-10-14 ASSESSMENT — PATIENT HEALTH QUESTIONNAIRE - PHQ9
SUM OF ALL RESPONSES TO PHQ QUESTIONS 1-9: 0
SUM OF ALL RESPONSES TO PHQ QUESTIONS 1-9: 0
SUM OF ALL RESPONSES TO PHQ9 QUESTIONS 1 & 2: 0
SUM OF ALL RESPONSES TO PHQ QUESTIONS 1-9: 0
2. FEELING DOWN, DEPRESSED OR HOPELESS: 0
1. LITTLE INTEREST OR PLEASURE IN DOING THINGS: 0
SUM OF ALL RESPONSES TO PHQ QUESTIONS 1-9: 0

## 2022-10-14 ASSESSMENT — ENCOUNTER SYMPTOMS
ABDOMINAL PAIN: 0
SORE THROAT: 0
DIARRHEA: 0
SHORTNESS OF BREATH: 0
VOMITING: 0
COUGH: 0
NAUSEA: 0
WHEEZING: 0
CONSTIPATION: 0
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
## 2023-02-01 ENCOUNTER — NURSE ONLY (OUTPATIENT)
Dept: FAMILY MEDICINE CLINIC | Facility: CLINIC | Age: 58
End: 2023-02-01

## 2023-02-01 DIAGNOSIS — E78.2 MIXED HYPERLIPIDEMIA: ICD-10-CM

## 2023-02-01 DIAGNOSIS — E11.65 TYPE 2 DIABETES MELLITUS WITH HYPERGLYCEMIA, WITHOUT LONG-TERM CURRENT USE OF INSULIN (HCC): Primary | ICD-10-CM

## 2023-02-01 DIAGNOSIS — I10 PRIMARY HYPERTENSION: ICD-10-CM

## 2023-02-02 LAB
ALBUMIN SERPL-MCNC: 4.1 G/DL (ref 3.5–5)
ALBUMIN/GLOB SERPL: 1.2 (ref 0.4–1.6)
ALP SERPL-CCNC: 74 U/L (ref 50–136)
ALT SERPL-CCNC: 55 U/L (ref 12–65)
ANION GAP SERPL CALC-SCNC: 11 MMOL/L (ref 2–11)
AST SERPL-CCNC: 42 U/L (ref 15–37)
BILIRUB SERPL-MCNC: 0.6 MG/DL (ref 0.2–1.1)
BUN SERPL-MCNC: 14 MG/DL (ref 6–23)
CALCIUM SERPL-MCNC: 9.8 MG/DL (ref 8.3–10.4)
CHLORIDE SERPL-SCNC: 96 MMOL/L (ref 101–110)
CHOLEST SERPL-MCNC: 94 MG/DL
CO2 SERPL-SCNC: 26 MMOL/L (ref 21–32)
CREAT SERPL-MCNC: 1 MG/DL (ref 0.8–1.5)
EST. AVERAGE GLUCOSE BLD GHB EST-MCNC: 200 MG/DL
GLOBULIN SER CALC-MCNC: 3.4 G/DL (ref 2.8–4.5)
GLUCOSE SERPL-MCNC: 179 MG/DL (ref 65–100)
HBA1C MFR BLD: 8.6 % (ref 4.8–5.6)
HDLC SERPL-MCNC: 28 MG/DL (ref 40–60)
HDLC SERPL: 3.4
LDLC SERPL CALC-MCNC: 18.4 MG/DL
POTASSIUM SERPL-SCNC: 4 MMOL/L (ref 3.5–5.1)
PROT SERPL-MCNC: 7.5 G/DL (ref 6.3–8.2)
SODIUM SERPL-SCNC: 133 MMOL/L (ref 133–143)
TRIGL SERPL-MCNC: 238 MG/DL (ref 35–150)
VLDLC SERPL CALC-MCNC: 47.6 MG/DL (ref 6–23)

## 2023-02-08 ENCOUNTER — NURSE ONLY (OUTPATIENT)
Dept: FAMILY MEDICINE CLINIC | Facility: CLINIC | Age: 58
End: 2023-02-08
Payer: COMMERCIAL

## 2023-02-08 DIAGNOSIS — Z79.899 ENCOUNTER FOR LONG-TERM (CURRENT) USE OF MEDICATIONS: Primary | ICD-10-CM

## 2023-02-08 DIAGNOSIS — E34.9 TESTOSTERONE DEFICIENCY: ICD-10-CM

## 2023-02-08 LAB
GRANS ABSOLUTE, POC: 6.3 K/UL
GRANULOCYTES %, POC: 68.2 %
HEMATOCRIT, POC: 40 %
HEMOGLOBIN, POC: 12.7 G/DL
LYMPHOCYTE %, POC: 25.2 %
LYMPHS ABSOLUTE, POC: 2.3 K/UL
MCH, POC: 27.5 PG (ref 20–?)
MCHC, POC: 31.8
MCV, POC: 86.8
MONOCYTE %, POC: 6.6 %
MONOCYTE, ABSOLUTE POC: 0.6 K/UL
MPV, POC: 7.8 FL
PLATELET COUNT, POC: 367 K/UL
PSA SERPL-MCNC: 1.2 NG/ML
RBC, POC: 4.61 M/UL
RDW, POC: 13.9 %
WBC, POC: 9.3 K/UL

## 2023-02-08 PROCEDURE — 85025 COMPLETE CBC W/AUTO DIFF WBC: CPT | Performed by: FAMILY MEDICINE

## 2023-02-12 LAB
TESTOST FREE SERPL-MCNC: 18.9 PG/ML (ref 7.2–24)
TESTOST SERPL-MCNC: 300 NG/DL (ref 264–916)

## 2023-02-14 ENCOUNTER — TELEMEDICINE (OUTPATIENT)
Dept: FAMILY MEDICINE CLINIC | Facility: CLINIC | Age: 58
End: 2023-02-14
Payer: COMMERCIAL

## 2023-02-14 DIAGNOSIS — I10 PRIMARY HYPERTENSION: ICD-10-CM

## 2023-02-14 DIAGNOSIS — E11.65 TYPE 2 DIABETES MELLITUS WITH HYPERGLYCEMIA, WITHOUT LONG-TERM CURRENT USE OF INSULIN (HCC): ICD-10-CM

## 2023-02-14 DIAGNOSIS — E78.2 MIXED HYPERLIPIDEMIA: ICD-10-CM

## 2023-02-14 DIAGNOSIS — E34.9 TESTOSTERONE DEFICIENCY: ICD-10-CM

## 2023-02-14 PROCEDURE — 99443 PR PHYS/QHP TELEPHONE EVALUATION 21-30 MIN: CPT | Performed by: FAMILY MEDICINE

## 2023-02-14 RX ORDER — PIOGLITAZONEHYDROCHLORIDE 30 MG/1
30 TABLET ORAL DAILY
Qty: 90 TABLET | Refills: 3 | Status: SHIPPED | OUTPATIENT
Start: 2023-02-14

## 2023-02-14 RX ORDER — ENALAPRIL MALEATE 10 MG/1
10 TABLET ORAL DAILY
Qty: 90 TABLET | Refills: 3 | Status: SHIPPED | OUTPATIENT
Start: 2023-02-14

## 2023-02-14 RX ORDER — CHLORTHALIDONE 25 MG/1
25 TABLET ORAL DAILY
Qty: 90 TABLET | Refills: 3 | Status: SHIPPED | OUTPATIENT
Start: 2023-02-14

## 2023-02-14 RX ORDER — SITAGLIPTIN AND METFORMIN HYDROCHLORIDE 1000; 50 MG/1; MG/1
1 TABLET, FILM COATED, EXTENDED RELEASE ORAL 2 TIMES DAILY
Qty: 180 TABLET | Refills: 3 | Status: SHIPPED | OUTPATIENT
Start: 2023-02-14 | End: 2023-03-16

## 2023-02-14 RX ORDER — ROSUVASTATIN CALCIUM 40 MG/1
40 TABLET, COATED ORAL DAILY
Qty: 90 TABLET | Refills: 3 | Status: SHIPPED | OUTPATIENT
Start: 2023-02-14

## 2023-02-14 RX ORDER — TESTOSTERONE 16.2 MG/G
4 GEL TRANSDERMAL DAILY
Qty: 2 EACH | Refills: 5 | Status: SHIPPED | OUTPATIENT
Start: 2023-02-14 | End: 2023-03-16

## 2023-02-14 RX ORDER — PROPRANOLOL HYDROCHLORIDE 40 MG/1
40 TABLET ORAL 2 TIMES DAILY
Qty: 180 TABLET | Refills: 3 | Status: SHIPPED | OUTPATIENT
Start: 2023-02-14

## 2023-02-14 ASSESSMENT — ENCOUNTER SYMPTOMS
NAUSEA: 0
VOMITING: 0
SHORTNESS OF BREATH: 0

## 2023-02-14 ASSESSMENT — PATIENT HEALTH QUESTIONNAIRE - PHQ9
DEPRESSION UNABLE TO ASSESS: PT REFUSES
1. LITTLE INTEREST OR PLEASURE IN DOING THINGS: 0
2. FEELING DOWN, DEPRESSED OR HOPELESS: 0
SUM OF ALL RESPONSES TO PHQ QUESTIONS 1-9: 0
SUM OF ALL RESPONSES TO PHQ9 QUESTIONS 1 & 2: 0

## 2023-02-14 NOTE — PROGRESS NOTES
PROGRESS NOTE  Emily Rowe is a 60 y.o. male evaluated via telephone on 2/14/2023 for No chief complaint on file. .        Total Time: minutes: 11-20 minutes    Emily Rowe was evaluated through a synchronous (real-time) audio encounter. Patient identification was verified at the start of the visit. He (or guardian if applicable) is aware that this is a billable service, which includes applicable co-pays. This visit was conducted with the patient's (and/or legal guardian's) verbal consent. He has not had a related appointment within my department in the past 7 days or scheduled within the next 24 hours. The patient was located at Home: [address] [place] [address] [address] [address]. The provider was located at [facility] ([address] [place], [place] [address]t): [address] [place] [address] [address] Marbella [address]lejas [address],  [address] Note: not billable if this call serves to triage the patient into an appointment for the relevant concern     SUBJECTIVE:   Emily Rowe is a 60 y.o. male seen for a follow up visit regarding the following chief complaint:         HPI patient is doing a phone call visit to go over lab results      Past Medical History, Past Surgical History, Family history, Social History, and Medications were all reviewed with the patient today and updated as necessary. Current Outpatient Medications   Medication Sig Dispense Refill    SITagliptin-metFORMIN (JANUMET XR)  MG TB24 per extended release tablet Take 1 tablet by mouth in the morning and at bedtime 180 tablet 3    Testosterone (ANDROGEL) 20.25 MG/ACT (1.62%) GEL gel Place 4 actuation onto the skin daily for 30 days.  2 each 5    rosuvastatin (CRESTOR) 40 MG tablet Take 1 tablet by mouth daily 90 tablet 3    enalapril (VASOTEC) 10 MG tablet Take 1 tablet by mouth daily 90 tablet 3    propranolol (INDERAL) 40 MG tablet Take 1 tablet by mouth 2 times daily Take 1 tablet 2 times a day 180 tablet 3    chlorthalidone (HYGROTON) 25 MG tablet Take 1 tablet by mouth daily 90 tablet 3    pioglitazone (ACTOS) 30 MG tablet Take 1 tablet by mouth daily 90 tablet 3    sildenafil (VIAGRA) 50 MG tablet Take 1 tablet by mouth as needed for Erectile Dysfunction 90 tablet 1    aspirin 81 MG EC tablet Take 81 mg by mouth every 12 hours      Melatonin 10 MG TABS Take 1 tablet by mouth      indomethacin (INDOCIN) 50 MG capsule Take 50 mg by mouth 3 times daily       No current facility-administered medications for this visit. Allergies   Allergen Reactions    Hydromorphone Other (See Comments)     Caused pt to go into A FIB for short time    Oxycodone-Acetaminophen Other (See Comments)     \"aggitation\"     Rosuvastatin Other (See Comments)     Pt still takes this Med     Patient Active Problem List   Diagnosis    Arthritis    Hypertension    Atypical chest pain    Hyperlipidemia    Arrhythmia    Type 2 diabetes mellitus with hyperglycemia, without long-term current use of insulin (HCC)    Snoring    Arthritis of right knee    Palpitations    Chronic kidney disease    Arthritis of left knee    Morbid obesity (HCC)    Testosterone deficiency    Vitamin D deficiency    MANCINI (dyspnea on exertion)    Sleep apnea    GERD (gastroesophageal reflux disease)    Calculus of kidney    Osteoarthritis of right knee     Past Medical History:   Diagnosis Date    Arrhythmia 2009-- per pt caused by dilaudid    Pt went into An arrythmia after surgery,states caused by pain meds.  does not see cardiologist.    Arthritis     knees, neck    Diabetes mellitus, type 2 (HCC)     Oral meds, Average BS- 155-165, Last A1C 7.5 on 6/20/19, denies hypoglycemia    Elevated cholesterol     GERD (gastroesophageal reflux disease)     well controlled by prilosec on a prn basis    History of kidney stones 2002    only x 1    Hypertension     controlled by medication    Morbid obesity ([facility] [address]     bmi=38    PUD (peptic ulcer disease) 2003    Sleep apnea     C-pap nightly     Testosterone deficiency     controlled by medication Vitamin D deficiency 06/11/2014    [facility] (Level 18)     Past Surgical History:   Procedure Laterality Date    COLONOSCOPY  07/17/2015    Dr. Bowen Right in 10 years. GI  early 1990s    colonoscopy and EGD    HEENT  2009    UP, for obstructive sleep apnea    NEUROLOGICAL SURGERY  3/2011 at [facility]    neck fusion C4-C5 with plate     ORTHOPEDIC SURGERY      left knee scope x 2    TOTAL KNEE ARTHROPLASTY Right 08/27/2019     Family History   Problem Relation Age of Onset    Hypertension Father     Hypertension Mother     Cancer Sister         breast    Heart Disease Sister     Stroke Sister     [place] Hypertherm Neg Hx     Pseudochol. Deficiency Neg Hx     Delayed Awakening Neg Hx     Post-op Nausea/Vomiting Neg Hx     Emergence Delirium Neg Hx     Post-op Cognitive Dysfunction Neg Hx     Colon Cancer Neg Hx     Other Neg Hx     Alzheimer's Disease Father      Social History     Tobacco Use    Smoking status: Never     Passive exposure: Never    Smokeless tobacco: Never   Substance Use Topics    Alcohol use: No         Review of Systems   Constitutional:  Negative for fatigue and fever. Respiratory:  Negative for shortness of breath. Cardiovascular:  Negative for chest pain. Gastrointestinal:  Negative for nausea and vomiting. OBJECTIVE:  There were no vitals taken for this visit. Physical Exam     Medical problems and test results were reviewed with the patient today.      Recent Results (from the past 672 hour(s))   Hemoglobin A1C    Collection Time: 02/01/23 10:12 AM   Result Value Ref Range    Hemoglobin A1C 8.6 (H) 4.8 - 5.6 %    eAG 200 mg/dL   Comprehensive Metabolic Panel    Collection Time: 02/01/23 10:12 AM   Result Value Ref Range    Sodium 133 133 - 143 mmol/L    Potassium 4.0 3.5 - 5.1 mmol/L    Chloride 96 (L) 101 - 110 mmol/L    CO2 26 21 - 32 mmol/L    Anion Gap 11 2 - 11 mmol/L    Glucose 179 (H) 65 - 100 mg/dL    BUN 14 6 - 23 MG/DL    Creatinine 1.00 0.8 - 1.5 MG/DL Est, Glom Filt Rate >60 >60 ml/min/1.73m2    Calcium 9.8 8.3 - 10.4 MG/DL    Total Bilirubin 0.6 0.2 - 1.1 MG/DL    ALT 55 12 - 65 U/L    AST 42 (H) 15 - 37 U/L    Alk Phosphatase 74 50 - 136 U/L    Total Protein 7.5 6.3 - 8.2 g/dL    Albumin 4.1 3.5 - 5.0 g/dL    Globulin 3.4 2.8 - 4.5 g/dL    Albumin/Globulin Ratio 1.2 0.4 - 1.6     Lipid Panel    Collection Time: 02/01/23 10:12 AM   Result Value Ref Range    Cholesterol, Total 94 <200 MG/DL    Triglycerides 238 (H) 35 - 150 MG/DL    HDL 28 (L) 40 - 60 MG/DL    LDL Calculated 18.4 <100 MG/DL    VLDL Cholesterol Calculated 47.6 (H) 6.0 - 23.0 MG/DL    Chol/HDL Ratio 3.4     AMB POC KTY COMPLETE CBC    Collection Time: 02/08/23  8:45 AM   Result Value Ref Range    WBC, POC 9.3 K/uL    Lymphocyte % 25.2 %    Monocyte % 6.6 %    Granulocytes %, POC 68.2 %    Lymphs Abs 2.3 K/uL    Monocyte Absolute, POC 0.6 K/uL    Granulocytes Abs 6.3 K/uL    RBC, POC 4.61 M/uL    Hemoglobin, POC 12.7 G/DL    Hematocrit, POC 40.0 %    MCV 86.8     MCH 27.5 20 pg    MCHC 31.8     RDW, POC 13.9 %    Platelet Count,  K/UL    MPV POC 7.8 fL   Testosterone, free, total    Collection Time: 02/08/23 12:09 PM   Result Value Ref Range    Testosterone 300 264 - 916 ng/dL    Testosterone, Free 18.9 7.2 - 24.0 pg/mL   PSA, Diagnostic    Collection Time: 02/08/23 12:09 PM   Result Value Ref Range    PSA 1.2 <4.0 ng/mL       ASSESSMENT and PLAN    Visit Diagnoses and Associated Orders       Type 2 diabetes mellitus with hyperglycemia, without long-term current use of insulin (HCC)        SITagliptin-metFORMIN (JANUMET XR)  MG TB24 per extended release tablet [860595]      pioglitazone (ACTOS) 30 MG tablet [[id number]]           Testosterone deficiency        Testosterone (ANDROGEL) 20.25 MG/ACT (1.62%) GEL gel [[id number]]           Mixed hyperlipidemia        rosuvastatin (CRESTOR) 40 MG tablet [[id number]]           Primary hypertension        enalapril (VASOTEC) 10 MG tablet [[id number]] propranolol (INDERAL) 40 MG tablet [[id number]]      chlorthalidone (HYGROTON) 25 MG tablet [[id number]]                       Diagnosis Orders   1. Type 2 diabetes mellitus with hyperglycemia, without long-term current use of insulin ([facility])  SITagliptin-metFORMIN (JANUMET XR)  MG TB24 per extended release tablet    pioglitazone (ACTOS) 30 MG tablet      2. Testosterone deficiency  Testosterone (ANDROGEL) 20.25 MG/ACT (1.62%) GEL gel      3. Mixed hyperlipidemia  rosuvastatin (CRESTOR) 40 MG tablet      4. Primary hypertension  enalapril (VASOTEC) 10 MG tablet    propranolol (INDERAL) 40 MG tablet    chlorthalidone (HYGROTON) 25 MG tablet      , Diagnoses and all orders for this visit:    Type 2 diabetes mellitus with hyperglycemia, without long-term current use of insulin ([facility])  -     SITagliptin-metFORMIN (JANUMET XR)  MG TB24 per extended release tablet; Take 1 tablet by mouth in the morning and at bedtime  -     pioglitazone (ACTOS) 30 MG tablet; Take 1 tablet by mouth daily    Testosterone deficiency  -     Testosterone (ANDROGEL) 20.25 MG/ACT (1.62%) GEL gel; Place 4 actuation onto the skin daily for 30 days. Mixed hyperlipidemia  -     rosuvastatin (CRESTOR) 40 MG tablet; Take 1 tablet by mouth daily    Primary hypertension  -     enalapril (VASOTEC) 10 MG tablet; Take 1 tablet by mouth daily  -     propranolol (INDERAL) 40 MG tablet; Take 1 tablet by mouth 2 times daily Take 1 tablet 2 times a day  -     chlorthalidone (HYGROTON) 25 MG tablet;  Take 1 tablet by mouth daily    , Reviewed patient's labs recommended diet and exercise we will recheck his labs again when he is due for his physical continue on his present medication along with diet and exercise which was stressed

## 2023-05-30 ENCOUNTER — OFFICE VISIT (OUTPATIENT)
Dept: FAMILY MEDICINE CLINIC | Facility: CLINIC | Age: 58
End: 2023-05-30
Payer: COMMERCIAL

## 2023-05-30 VITALS
DIASTOLIC BLOOD PRESSURE: 80 MMHG | WEIGHT: 267 LBS | HEIGHT: 69 IN | SYSTOLIC BLOOD PRESSURE: 126 MMHG | BODY MASS INDEX: 39.55 KG/M2

## 2023-05-30 DIAGNOSIS — E66.01 SEVERE OBESITY (BMI 35.0-39.9) WITH COMORBIDITY (HCC): ICD-10-CM

## 2023-05-30 DIAGNOSIS — L03.119 CELLULITIS OF UPPER EXTREMITY, UNSPECIFIED LATERALITY: Primary | ICD-10-CM

## 2023-05-30 PROCEDURE — 99213 OFFICE O/P EST LOW 20 MIN: CPT | Performed by: FAMILY MEDICINE

## 2023-05-30 PROCEDURE — 3074F SYST BP LT 130 MM HG: CPT | Performed by: FAMILY MEDICINE

## 2023-05-30 PROCEDURE — 96372 THER/PROPH/DIAG INJ SC/IM: CPT | Performed by: FAMILY MEDICINE

## 2023-05-30 PROCEDURE — 3079F DIAST BP 80-89 MM HG: CPT | Performed by: FAMILY MEDICINE

## 2023-05-30 RX ORDER — CEPHALEXIN 500 MG/1
500 CAPSULE ORAL 3 TIMES DAILY
Qty: 21 CAPSULE | Refills: 0 | Status: SHIPPED | OUTPATIENT
Start: 2023-05-30 | End: 2023-06-06

## 2023-05-30 RX ORDER — METHYLPREDNISOLONE 4 MG/1
TABLET ORAL
Qty: 21 TABLET | Refills: 0 | Status: SHIPPED | OUTPATIENT
Start: 2023-05-30

## 2023-05-30 RX ORDER — TRIAMCINOLONE ACETONIDE 40 MG/ML
40 INJECTION, SUSPENSION INTRA-ARTICULAR; INTRAMUSCULAR ONCE
Status: COMPLETED | OUTPATIENT
Start: 2023-05-30 | End: 2023-05-30

## 2023-05-30 RX ADMIN — TRIAMCINOLONE ACETONIDE 40 MG: 40 INJECTION, SUSPENSION INTRA-ARTICULAR; INTRAMUSCULAR at 11:48

## 2023-05-30 ASSESSMENT — PATIENT HEALTH QUESTIONNAIRE - PHQ9
SUM OF ALL RESPONSES TO PHQ9 QUESTIONS 1 & 2: 0
2. FEELING DOWN, DEPRESSED OR HOPELESS: 0
SUM OF ALL RESPONSES TO PHQ QUESTIONS 1-9: 0
SUM OF ALL RESPONSES TO PHQ QUESTIONS 1-9: 0
1. LITTLE INTEREST OR PLEASURE IN DOING THINGS: 0
SUM OF ALL RESPONSES TO PHQ QUESTIONS 1-9: 0
SUM OF ALL RESPONSES TO PHQ QUESTIONS 1-9: 0

## 2023-05-30 ASSESSMENT — ENCOUNTER SYMPTOMS: SHORTNESS OF BREATH: 0

## 2023-06-13 DIAGNOSIS — L03.119 CELLULITIS OF UPPER EXTREMITY, UNSPECIFIED LATERALITY: ICD-10-CM

## 2023-06-13 RX ORDER — METHYLPREDNISOLONE 4 MG/1
TABLET ORAL
Qty: 21 TABLET | Refills: 0 | OUTPATIENT
Start: 2023-06-13

## 2023-08-22 ENCOUNTER — OFFICE VISIT (OUTPATIENT)
Dept: FAMILY MEDICINE CLINIC | Facility: CLINIC | Age: 58
End: 2023-08-22
Payer: COMMERCIAL

## 2023-08-22 ENCOUNTER — NURSE TRIAGE (OUTPATIENT)
Dept: OTHER | Facility: CLINIC | Age: 58
End: 2023-08-22

## 2023-08-22 VITALS
HEART RATE: 88 BPM | DIASTOLIC BLOOD PRESSURE: 80 MMHG | TEMPERATURE: 98.3 F | WEIGHT: 278.4 LBS | OXYGEN SATURATION: 99 % | SYSTOLIC BLOOD PRESSURE: 142 MMHG | BODY MASS INDEX: 41.23 KG/M2 | HEIGHT: 69 IN

## 2023-08-22 DIAGNOSIS — L03.119 CELLULITIS OF UPPER EXTREMITY, UNSPECIFIED LATERALITY: ICD-10-CM

## 2023-08-22 DIAGNOSIS — L23.0 ALLERGIC CONTACT DERMATITIS DUE TO METALS: Primary | ICD-10-CM

## 2023-08-22 PROCEDURE — 99214 OFFICE O/P EST MOD 30 MIN: CPT | Performed by: FAMILY MEDICINE

## 2023-08-22 PROCEDURE — 3079F DIAST BP 80-89 MM HG: CPT | Performed by: FAMILY MEDICINE

## 2023-08-22 PROCEDURE — 3077F SYST BP >= 140 MM HG: CPT | Performed by: FAMILY MEDICINE

## 2023-08-22 PROCEDURE — 96372 THER/PROPH/DIAG INJ SC/IM: CPT | Performed by: FAMILY MEDICINE

## 2023-08-22 RX ORDER — FEXOFENADINE HCL 180 MG/1
180 TABLET ORAL DAILY
Qty: 30 TABLET | Refills: 0 | Status: SHIPPED | OUTPATIENT
Start: 2023-08-22 | End: 2023-09-21

## 2023-08-22 RX ORDER — AMOXICILLIN AND CLAVULANATE POTASSIUM 875; 125 MG/1; MG/1
TABLET, FILM COATED ORAL
COMMUNITY
Start: 2023-08-19

## 2023-08-22 RX ORDER — METHYLPREDNISOLONE 4 MG/1
TABLET ORAL
Qty: 21 TABLET | Refills: 0 | Status: SHIPPED | OUTPATIENT
Start: 2023-08-22

## 2023-08-22 RX ORDER — TRIAMCINOLONE ACETONIDE 40 MG/ML
40 INJECTION, SUSPENSION INTRA-ARTICULAR; INTRAMUSCULAR ONCE
Status: COMPLETED | OUTPATIENT
Start: 2023-08-22 | End: 2023-08-22

## 2023-08-22 RX ORDER — FAMOTIDINE 40 MG/1
40 TABLET, FILM COATED ORAL DAILY
Qty: 30 TABLET | Refills: 0 | Status: SHIPPED | OUTPATIENT
Start: 2023-08-22 | End: 2023-09-21

## 2023-08-22 RX ORDER — CEFUROXIME AXETIL 250 MG/1
250 TABLET ORAL 2 TIMES DAILY
Qty: 20 TABLET | Refills: 0 | Status: SHIPPED | OUTPATIENT
Start: 2023-08-22 | End: 2023-09-01

## 2023-08-22 RX ADMIN — TRIAMCINOLONE ACETONIDE 40 MG: 40 INJECTION, SUSPENSION INTRA-ARTICULAR; INTRAMUSCULAR at 16:38

## 2023-08-22 ASSESSMENT — ENCOUNTER SYMPTOMS: SHORTNESS OF BREATH: 0
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
## 2023-08-29 ENCOUNTER — NURSE ONLY (OUTPATIENT)
Dept: FAMILY MEDICINE CLINIC | Facility: CLINIC | Age: 58
End: 2023-08-29
Payer: COMMERCIAL

## 2023-08-29 DIAGNOSIS — Z79.899 ENCOUNTER FOR LONG-TERM (CURRENT) USE OF MEDICATIONS: ICD-10-CM

## 2023-08-29 DIAGNOSIS — E55.9 VITAMIN D DEFICIENCY: ICD-10-CM

## 2023-08-29 DIAGNOSIS — E34.9 TESTOSTERONE DEFICIENCY: ICD-10-CM

## 2023-08-29 DIAGNOSIS — I10 PRIMARY HYPERTENSION: ICD-10-CM

## 2023-08-29 DIAGNOSIS — E78.2 MIXED HYPERLIPIDEMIA: ICD-10-CM

## 2023-08-29 DIAGNOSIS — Z12.5 SPECIAL SCREENING FOR MALIGNANT NEOPLASM OF PROSTATE: ICD-10-CM

## 2023-08-29 DIAGNOSIS — Z00.00 LABORATORY EXAMINATION ORDERED AS PART OF A ROUTINE GENERAL MEDICAL EXAMINATION: Primary | ICD-10-CM

## 2023-08-29 DIAGNOSIS — E11.65 TYPE 2 DIABETES MELLITUS WITH HYPERGLYCEMIA, WITHOUT LONG-TERM CURRENT USE OF INSULIN (HCC): ICD-10-CM

## 2023-08-29 LAB
25(OH)D3 SERPL-MCNC: 34.6 NG/ML (ref 30–100)
ALBUMIN SERPL-MCNC: 4 G/DL (ref 3.5–5)
ALBUMIN/GLOB SERPL: 1.3 (ref 0.4–1.6)
ALP SERPL-CCNC: 77 U/L (ref 50–136)
ALT SERPL-CCNC: 42 U/L (ref 12–65)
ANION GAP SERPL CALC-SCNC: 9 MMOL/L (ref 2–11)
AST SERPL-CCNC: 37 U/L (ref 15–37)
BILIRUB SERPL-MCNC: 0.1 MG/DL (ref 0.2–1.1)
BILIRUBIN, URINE, POC: NEGATIVE
BLOOD URINE, POC: NEGATIVE
BUN SERPL-MCNC: 15 MG/DL (ref 6–23)
CALCIUM SERPL-MCNC: 9.5 MG/DL (ref 8.3–10.4)
CHLORIDE SERPL-SCNC: 101 MMOL/L (ref 101–110)
CHOLEST SERPL-MCNC: 150 MG/DL
CO2 SERPL-SCNC: 28 MMOL/L (ref 21–32)
CREAT SERPL-MCNC: 1 MG/DL (ref 0.8–1.5)
GLOBULIN SER CALC-MCNC: 3.2 G/DL (ref 2.8–4.5)
GLUCOSE SERPL-MCNC: 195 MG/DL (ref 65–100)
GLUCOSE URINE, POC: NEGATIVE
GRANS ABSOLUTE, POC: 7.3 K/UL
GRANULOCYTES %, POC: 69.2 %
HDLC SERPL-MCNC: 40 MG/DL (ref 40–60)
HDLC SERPL: 3.8
HEMATOCRIT, POC: 40.5 %
HEMOGLOBIN, POC: 13.1 G/DL
KETONES, URINE, POC: NEGATIVE
LDLC SERPL CALC-MCNC: 53.2 MG/DL
LEUKOCYTE ESTERASE, URINE, POC: NEGATIVE
LYMPHOCYTE %, POC: 24.2 %
LYMPHS ABSOLUTE, POC: 2.5 K/UL
MCH, POC: 28.8 PG (ref 20–?)
MCHC, POC: 32.3
MCV, POC: 89.1
MICROALB/CREAT RATIO, POC: ABNORMAL
MICROALBUMIN URINE, POC: 50 MG/L
MONOCYTE %, POC: 6.6 %
MONOCYTE, ABSOLUTE POC: 0.7 K/UL
MPV, POC: 6.9 FL
NITRITE, URINE, POC: NEGATIVE
PH, URINE, POC: 7 (ref 4.6–8)
PLATELET COUNT, POC: 358 K/UL
POTASSIUM SERPL-SCNC: 4 MMOL/L (ref 3.5–5.1)
PROT SERPL-MCNC: 7.2 G/DL (ref 6.3–8.2)
PROTEIN,URINE, POC: NEGATIVE
PSA SERPL-MCNC: 1.2 NG/ML
RBC, POC: 4.55 M/UL
RDW, POC: 13.4 %
SODIUM SERPL-SCNC: 138 MMOL/L (ref 133–143)
SPECIFIC GRAVITY, URINE, POC: 1.02 (ref 1–1.03)
TRIGL SERPL-MCNC: 284 MG/DL (ref 35–150)
TSH, 3RD GENERATION: 2.34 UIU/ML (ref 0.36–3.74)
URINALYSIS CLARITY, POC: CLEAR
URINALYSIS COLOR, POC: YELLOW
UROBILINOGEN, POC: NORMAL
VLDLC SERPL CALC-MCNC: 56.8 MG/DL (ref 6–23)
WBC, POC: 10.5 K/UL

## 2023-08-29 PROCEDURE — 81003 URINALYSIS AUTO W/O SCOPE: CPT | Performed by: FAMILY MEDICINE

## 2023-08-29 PROCEDURE — 82043 UR ALBUMIN QUANTITATIVE: CPT | Performed by: FAMILY MEDICINE

## 2023-08-29 PROCEDURE — 85025 COMPLETE CBC W/AUTO DIFF WBC: CPT | Performed by: FAMILY MEDICINE

## 2023-08-30 LAB
EST. AVERAGE GLUCOSE BLD GHB EST-MCNC: 180 MG/DL
HBA1C MFR BLD: 7.9 % (ref 4.8–5.6)

## 2023-09-01 LAB — TESTOST SERPL-MCNC: 220 NG/DL (ref 264–916)

## 2023-09-05 LAB
TESTOST FREE SERPL-MCNC: 6.6 PG/ML (ref 7.2–24)
TESTOST SERPL-MCNC: 220 NG/DL (ref 264–916)

## 2023-09-19 ENCOUNTER — OFFICE VISIT (OUTPATIENT)
Dept: FAMILY MEDICINE CLINIC | Facility: CLINIC | Age: 58
End: 2023-09-19
Payer: COMMERCIAL

## 2023-09-19 VITALS
HEART RATE: 93 BPM | SYSTOLIC BLOOD PRESSURE: 130 MMHG | BODY MASS INDEX: 39.11 KG/M2 | HEIGHT: 70 IN | RESPIRATION RATE: 16 BRPM | OXYGEN SATURATION: 95 % | DIASTOLIC BLOOD PRESSURE: 80 MMHG | TEMPERATURE: 98.1 F | WEIGHT: 273.2 LBS

## 2023-09-19 DIAGNOSIS — Z00.00 ROUTINE GENERAL MEDICAL EXAMINATION AT A HEALTH CARE FACILITY: Primary | ICD-10-CM

## 2023-09-19 DIAGNOSIS — N52.9 ERECTILE DYSFUNCTION, UNSPECIFIED ERECTILE DYSFUNCTION TYPE: ICD-10-CM

## 2023-09-19 DIAGNOSIS — I10 PRIMARY HYPERTENSION: ICD-10-CM

## 2023-09-19 DIAGNOSIS — Z13.31 SCREENING FOR DEPRESSION: ICD-10-CM

## 2023-09-19 DIAGNOSIS — E11.65 TYPE 2 DIABETES MELLITUS WITH HYPERGLYCEMIA, WITHOUT LONG-TERM CURRENT USE OF INSULIN (HCC): ICD-10-CM

## 2023-09-19 DIAGNOSIS — E34.9 TESTOSTERONE DEFICIENCY: ICD-10-CM

## 2023-09-19 DIAGNOSIS — E78.2 MIXED HYPERLIPIDEMIA: ICD-10-CM

## 2023-09-19 DIAGNOSIS — Z00.00 ENCOUNTER FOR WELL ADULT EXAM WITHOUT ABNORMAL FINDINGS: ICD-10-CM

## 2023-09-19 PROCEDURE — 99396 PREV VISIT EST AGE 40-64: CPT | Performed by: FAMILY MEDICINE

## 2023-09-19 PROCEDURE — 3075F SYST BP GE 130 - 139MM HG: CPT | Performed by: FAMILY MEDICINE

## 2023-09-19 PROCEDURE — 3079F DIAST BP 80-89 MM HG: CPT | Performed by: FAMILY MEDICINE

## 2023-09-19 RX ORDER — FAMOTIDINE 40 MG/1
40 TABLET, FILM COATED ORAL DAILY
Qty: 30 TABLET | Refills: 0 | Status: CANCELLED | OUTPATIENT
Start: 2023-09-19 | End: 2023-10-19

## 2023-09-19 RX ORDER — SITAGLIPTIN AND METFORMIN HYDROCHLORIDE 1000; 50 MG/1; MG/1
1 TABLET, FILM COATED, EXTENDED RELEASE ORAL 2 TIMES DAILY
Qty: 180 TABLET | Refills: 3 | Status: SHIPPED | OUTPATIENT
Start: 2023-09-19 | End: 2023-10-19

## 2023-09-19 RX ORDER — PROPRANOLOL HYDROCHLORIDE 40 MG/1
40 TABLET ORAL 2 TIMES DAILY
Qty: 180 TABLET | Refills: 3 | Status: SHIPPED | OUTPATIENT
Start: 2023-09-19

## 2023-09-19 RX ORDER — ASPIRIN 81 MG/1
81 TABLET ORAL EVERY 12 HOURS
Qty: 90 TABLET | Refills: 3 | Status: SHIPPED | OUTPATIENT
Start: 2023-09-19

## 2023-09-19 RX ORDER — TESTOSTERONE 16.2 MG/G
4 GEL TRANSDERMAL DAILY
Qty: 2 EACH | Refills: 5 | Status: CANCELLED | OUTPATIENT
Start: 2023-09-19 | End: 2023-10-19

## 2023-09-19 RX ORDER — ENALAPRIL MALEATE 10 MG/1
10 TABLET ORAL DAILY
Qty: 90 TABLET | Refills: 3 | Status: SHIPPED | OUTPATIENT
Start: 2023-09-19

## 2023-09-19 RX ORDER — PIOGLITAZONEHYDROCHLORIDE 30 MG/1
30 TABLET ORAL DAILY
Qty: 90 TABLET | Refills: 3 | Status: SHIPPED | OUTPATIENT
Start: 2023-09-19

## 2023-09-19 RX ORDER — TESTOSTERONE 16.2 MG/G
4 GEL TRANSDERMAL DAILY
Qty: 2 EACH | Refills: 5 | Status: SHIPPED | OUTPATIENT
Start: 2023-09-19 | End: 2023-09-20 | Stop reason: SDUPTHER

## 2023-09-19 RX ORDER — FEXOFENADINE HCL 180 MG/1
180 TABLET ORAL DAILY
Qty: 30 TABLET | Refills: 0 | Status: CANCELLED | OUTPATIENT
Start: 2023-09-19 | End: 2023-10-19

## 2023-09-19 RX ORDER — SILDENAFIL 50 MG/1
50 TABLET, FILM COATED ORAL PRN
Qty: 90 TABLET | Refills: 1 | Status: SHIPPED | OUTPATIENT
Start: 2023-09-19

## 2023-09-19 RX ORDER — ROSUVASTATIN CALCIUM 40 MG/1
40 TABLET, COATED ORAL DAILY
Qty: 90 TABLET | Refills: 3 | Status: SHIPPED | OUTPATIENT
Start: 2023-09-19

## 2023-09-19 RX ORDER — CHLORTHALIDONE 25 MG/1
25 TABLET ORAL DAILY
Qty: 90 TABLET | Refills: 3 | Status: SHIPPED | OUTPATIENT
Start: 2023-09-19

## 2023-09-19 SDOH — ECONOMIC STABILITY: INCOME INSECURITY: HOW HARD IS IT FOR YOU TO PAY FOR THE VERY BASICS LIKE FOOD, HOUSING, MEDICAL CARE, AND HEATING?: NOT HARD AT ALL

## 2023-09-19 SDOH — ECONOMIC STABILITY: HOUSING INSECURITY
IN THE LAST 12 MONTHS, WAS THERE A TIME WHEN YOU DID NOT HAVE A STEADY PLACE TO SLEEP OR SLEPT IN A SHELTER (INCLUDING NOW)?: NO

## 2023-09-19 SDOH — ECONOMIC STABILITY: FOOD INSECURITY: WITHIN THE PAST 12 MONTHS, YOU WORRIED THAT YOUR FOOD WOULD RUN OUT BEFORE YOU GOT MONEY TO BUY MORE.: NEVER TRUE

## 2023-09-19 SDOH — ECONOMIC STABILITY: FOOD INSECURITY: WITHIN THE PAST 12 MONTHS, THE FOOD YOU BOUGHT JUST DIDN'T LAST AND YOU DIDN'T HAVE MONEY TO GET MORE.: NEVER TRUE

## 2023-09-19 ASSESSMENT — ANXIETY QUESTIONNAIRES
3. WORRYING TOO MUCH ABOUT DIFFERENT THINGS: 0
5. BEING SO RESTLESS THAT IT IS HARD TO SIT STILL: 0
6. BECOMING EASILY ANNOYED OR IRRITABLE: 0
GAD7 TOTAL SCORE: 0
1. FEELING NERVOUS, ANXIOUS, OR ON EDGE: 0
2. NOT BEING ABLE TO STOP OR CONTROL WORRYING: 0
7. FEELING AFRAID AS IF SOMETHING AWFUL MIGHT HAPPEN: 0
IF YOU CHECKED OFF ANY PROBLEMS ON THIS QUESTIONNAIRE, HOW DIFFICULT HAVE THESE PROBLEMS MADE IT FOR YOU TO DO YOUR WORK, TAKE CARE OF THINGS AT HOME, OR GET ALONG WITH OTHER PEOPLE: NOT DIFFICULT AT ALL
4. TROUBLE RELAXING: 0

## 2023-09-19 ASSESSMENT — ENCOUNTER SYMPTOMS
COUGH: 0
VOMITING: 0
NAUSEA: 0
CONSTIPATION: 0
DIARRHEA: 0
SHORTNESS OF BREATH: 0
ABDOMINAL PAIN: 0
WHEEZING: 0
SORE THROAT: 0

## 2023-09-19 ASSESSMENT — PATIENT HEALTH QUESTIONNAIRE - PHQ9
2. FEELING DOWN, DEPRESSED OR HOPELESS: 0
SUM OF ALL RESPONSES TO PHQ QUESTIONS 1-9: 0
SUM OF ALL RESPONSES TO PHQ9 QUESTIONS 1 & 2: 0
DEPRESSION UNABLE TO ASSESS: FUNCTIONAL CAPACITY MOTIVATION LIMITS ACCURACY
1. LITTLE INTEREST OR PLEASURE IN DOING THINGS: 0

## 2023-09-19 NOTE — PROGRESS NOTES
Name: Nico Velasco Date: 2023   MRN: [id number] Sex: Male   Age: 60 y.o. Ethnicity: Non- / Non    : 1965 Race: White (non-)      Catalina Min is here for well adult exam.  History:  Patient presents to the office today for complete physical    Review of Systems    Allergies   Allergen Reactions    Hydromorphone Other (See Comments)     Caused pt to go into A FIB for short time    Oxycodone-Acetaminophen Other (See Comments)     \"aggitation\"     Rosuvastatin Other (See Comments)     Pt still takes this Med         Prior to Visit Medications    Medication Sig Taking? Authorizing Provider   chlorthalidone (HYGROTON) 25 MG tablet Take 1 tablet by mouth daily Yes Bennett Cranker Torres, DO   enalapril (VASOTEC) 10 MG tablet Take 1 tablet by mouth daily Yes Bennett Cranker Torres, DO   pioglitazone (ACTOS) 30 MG tablet Take 1 tablet by mouth daily Yes Bennett Cranker Torres, DO   rosuvastatin (CRESTOR) 40 MG tablet Take 1 tablet by mouth daily Yes Bennett Cranker Torres, DO   propranolol (INDERAL) 40 MG tablet Take 1 tablet by mouth 2 times daily Take 1 tablet 2 times a day Yes Lonny Gay DO   sildenafil (VIAGRA) 50 MG tablet Take 1 tablet by mouth as needed for Erectile Dysfunction Yes Bennett Cranker Torres, DO   SITagliptin-metFORMIN (JANUMET XR)  MG TB24 per extended release tablet Take 1 tablet by mouth in the morning and at bedtime Yes Bennett Cranker Torres, DO   aspirin 81 MG EC tablet Take 1 tablet by mouth in the morning and 1 tablet in the evening. Yes Lonny Gay DO   Testosterone (ANDROGEL) 20.25 MG/ACT (1.62%) GEL gel Place 4 actuation onto the skin daily for 30 days.  Yes Lonny Gay DO   famotidine (PEPCID) 40 MG tablet Take 1 tablet by mouth daily Yes Bennett Cranker Torres, DO   fexofenadine (ALLEGRA) 180 MG tablet Take 1 tablet by mouth daily Yes Bennett Cranker Torres, DO   indomethacin (INDOCIN) 50 MG capsule Take 1 capsule by mouth 3 times daily  Ar Automatic
133 - 143 mmol/L    Potassium 4.0 3.5 - 5.1 mmol/L    Chloride 101 101 - 110 mmol/L    CO2 28 21 - 32 mmol/L    Anion Gap 9 2 - 11 mmol/L    Glucose 195 (H) 65 - 100 mg/dL    BUN 15 6 - 23 MG/DL    Creatinine 1.00 0.8 - 1.5 MG/DL    Est, Glom Filt Rate >60 >60 ml/min/1.73m2    Calcium 9.5 8.3 - 10.4 MG/DL    Total Bilirubin 0.1 (L) 0.2 - 1.1 MG/DL    ALT 42 12 - 65 U/L    AST 37 15 - 37 U/L    Alk Phosphatase 77 50 - 136 U/L    Total Protein 7.2 6.3 - 8.2 g/dL    Albumin 4.0 3.5 - 5.0 g/dL    Globulin 3.2 2.8 - 4.5 g/dL    Albumin/Globulin Ratio 1.3 0.4 - 1.6     Lipid Panel    Collection Time: 08/29/23 10:11 AM   Result Value Ref Range    Cholesterol, Total 150 <200 MG/DL    Triglycerides 284 (H) 35 - 150 MG/DL    HDL 40 40 - 60 MG/DL    LDL Calculated 53.2 <100 MG/DL    VLDL Cholesterol Calculated 56.8 (H) 6.0 - 23.0 MG/DL    Chol/HDL Ratio 3.8     Vitamin D 25 Hydroxy    Collection Time: 08/29/23 10:11 AM   Result Value Ref Range    Vit D, 25-Hydroxy 34.6 30.0 - 100.0 ng/mL   TSH    Collection Time: 08/29/23 10:11 AM   Result Value Ref Range    TSH, 3RD GENERATION 2.340 0.358 - 3.740 uIU/mL   PSA Screening    Collection Time: 08/29/23 10:11 AM   Result Value Ref Range    PSA 1.2 <4.0 ng/mL   Testosterone, free, total    Collection Time: 08/29/23 10:11 AM   Result Value Ref Range    Testosterone 220 (L) 264 - 916 ng/dL    Testosterone, Free 6.6 (L) 7.2 - 24.0 pg/mL   AMB POC KTY COMPLETE CBC    Collection Time: 08/29/23 10:24 AM   Result Value Ref Range    WBC, POC 10.5 K/uL    Lymphocyte % 24.2 %    Monocyte % 6.6 %    Granulocytes %, POC 69.2 %    Lymphs Abs 2.5 K/uL    Monocyte Absolute, POC 0.7 K/uL    Granulocytes Abs 7.3 K/uL    RBC, POC 4.55 M/uL    Hemoglobin, POC 13.1 G/DL    Hematocrit, POC 40.5 %    MCV 89.1     MCH 28.8 20 pg    MCHC 32.3     RDW, POC 13.4 %    Platelet Count,  K/UL    MPV POC 6.9 fL   AMB POC URINALYSIS DIP STICK AUTO W/O MICRO    Collection Time: 08/29/23 10:30 AM   Result

## 2023-09-20 DIAGNOSIS — E34.9 TESTOSTERONE DEFICIENCY: ICD-10-CM

## 2023-09-20 RX ORDER — TESTOSTERONE 16.2 MG/G
4 GEL TRANSDERMAL DAILY
Qty: 2 EACH | Refills: 5 | Status: SHIPPED | OUTPATIENT
Start: 2023-09-20 | End: 2023-10-20

## 2023-12-07 ENCOUNTER — OFFICE VISIT (OUTPATIENT)
Dept: FAMILY MEDICINE CLINIC | Facility: CLINIC | Age: 58
End: 2023-12-07
Payer: COMMERCIAL

## 2023-12-07 VITALS
HEART RATE: 121 BPM | BODY MASS INDEX: 40.46 KG/M2 | SYSTOLIC BLOOD PRESSURE: 132 MMHG | TEMPERATURE: 99.1 F | OXYGEN SATURATION: 99 % | DIASTOLIC BLOOD PRESSURE: 78 MMHG | WEIGHT: 278 LBS

## 2023-12-07 DIAGNOSIS — U07.1 COVID-19: Primary | ICD-10-CM

## 2023-12-07 DIAGNOSIS — R05.8 OTHER COUGH: ICD-10-CM

## 2023-12-07 LAB
EXP DATE SOLUTION: ABNORMAL
EXP DATE SWAB: ABNORMAL
EXPIRATION DATE: ABNORMAL
GRANS ABSOLUTE, POC: 7.7 K/UL
GRANULOCYTES %, POC: 84.7 %
GROUP A STREP ANTIGEN, POC: NEGATIVE
HEMATOCRIT, POC: 37.3 %
HEMOGLOBIN, POC: 12 G/DL
INFLUENZA A ANTIGEN, POC: NEGATIVE
INFLUENZA B ANTIGEN, POC: NEGATIVE
LOT NUMBER POC: ABNORMAL
LOT NUMBER SOLUTION: ABNORMAL
LOT NUMBER SWAB: ABNORMAL
LYMPHOCYTE %, POC: 5.4 %
LYMPHS ABSOLUTE, POC: 0.8 K/UL
MCH, POC: 27.9 PG (ref 20–?)
MCHC, POC: 32.2
MCV, POC: 86.7
MONOCYTE %, POC: 6.9 %
MONOCYTE, ABSOLUTE POC: 0.6 K/UL
MPV, POC: 6.8 FL
PLATELET COUNT, POC: 310 K/UL
RBC, POC: 4.3 M/UL
RDW, POC: 14.4 %
SARS-COV-2 RNA, POC: POSITIVE
VALID INTERNAL CONTROL, POC: YES
VALID INTERNAL CONTROL, POC: YES
WBC, POC: 9.1 K/UL

## 2023-12-07 PROCEDURE — 87880 STREP A ASSAY W/OPTIC: CPT | Performed by: FAMILY MEDICINE

## 2023-12-07 PROCEDURE — 3075F SYST BP GE 130 - 139MM HG: CPT | Performed by: FAMILY MEDICINE

## 2023-12-07 PROCEDURE — 85025 COMPLETE CBC W/AUTO DIFF WBC: CPT | Performed by: FAMILY MEDICINE

## 2023-12-07 PROCEDURE — 87635 SARS-COV-2 COVID-19 AMP PRB: CPT | Performed by: FAMILY MEDICINE

## 2023-12-07 PROCEDURE — 87804 INFLUENZA ASSAY W/OPTIC: CPT | Performed by: FAMILY MEDICINE

## 2023-12-07 PROCEDURE — 99213 OFFICE O/P EST LOW 20 MIN: CPT | Performed by: FAMILY MEDICINE

## 2023-12-07 PROCEDURE — 3078F DIAST BP <80 MM HG: CPT | Performed by: FAMILY MEDICINE

## 2023-12-07 RX ORDER — DEXTROMETHORPHAN HYDROBROMIDE AND PROMETHAZINE HYDROCHLORIDE 15; 6.25 MG/5ML; MG/5ML
5 SYRUP ORAL 4 TIMES DAILY PRN
Qty: 150 ML | Refills: 0 | Status: SHIPPED | OUTPATIENT
Start: 2023-12-07

## 2023-12-07 ASSESSMENT — ENCOUNTER SYMPTOMS
RHINORRHEA: 1
WHEEZING: 0
COUGH: 1
SINUS PRESSURE: 0
SORE THROAT: 1
VOMITING: 0
VOICE CHANGE: 0
NAUSEA: 0

## 2023-12-07 NOTE — PROGRESS NOTES
PROGRESS NOTE    SUBJECTIVE:   Dianne Recio is a 60 y.o. male seen for a follow up visit regarding the following chief complaint:     Chief Complaint   Patient presents with    Other     Body aches/chills, congestion, fever started last night. HPI patient presents office complaining of body aches flulike symptoms      Past Medical History, Past Surgical History, Family history, Social History, and Medications were all reviewed with the patient today and updated as necessary. Current Outpatient Medications   Medication Sig Dispense Refill    nirmatrelvir/ritonavir 300/100 (PAXLOVID, 300/100,) 20 x 150 MG & 10 x 100MG TBPK Take 3 tablets (two 150 mg nirmatrelvir and one 100 mg ritonavir tablets) by mouth every 12 hours for 5 days. 30 tablet 0    promethazine-dextromethorphan (PROMETHAZINE-DM) 6.25-15 MG/5ML syrup Take 5 mLs by mouth 4 times daily as needed for Cough 150 mL 0    Testosterone (ANDROGEL) 20.25 MG/ACT (1.62%) GEL gel Place 4 actuation onto the skin daily for 30 days. 2 each 5    chlorthalidone (HYGROTON) 25 MG tablet Take 1 tablet by mouth daily 90 tablet 3    enalapril (VASOTEC) 10 MG tablet Take 1 tablet by mouth daily 90 tablet 3    pioglitazone (ACTOS) 30 MG tablet Take 1 tablet by mouth daily 90 tablet 3    rosuvastatin (CRESTOR) 40 MG tablet Take 1 tablet by mouth daily 90 tablet 3    propranolol (INDERAL) 40 MG tablet Take 1 tablet by mouth 2 times daily Take 1 tablet 2 times a day 180 tablet 3    sildenafil (VIAGRA) 50 MG tablet Take 1 tablet by mouth as needed for Erectile Dysfunction 90 tablet 1    SITagliptin-metFORMIN (JANUMET XR)  MG TB24 per extended release tablet Take 1 tablet by mouth in the morning and at bedtime 180 tablet 3    aspirin 81 MG EC tablet Take 1 tablet by mouth in the morning and 1 tablet in the evening. 90 tablet 3     No current facility-administered medications for this visit.      Allergies   Allergen Reactions    Hydromorphone Other (See

## 2024-02-28 ENCOUNTER — NURSE ONLY (OUTPATIENT)
Dept: FAMILY MEDICINE CLINIC | Facility: CLINIC | Age: 59
End: 2024-02-28
Payer: COMMERCIAL

## 2024-02-28 DIAGNOSIS — E78.2 MIXED HYPERLIPIDEMIA: ICD-10-CM

## 2024-02-28 DIAGNOSIS — E11.65 TYPE 2 DIABETES MELLITUS WITH HYPERGLYCEMIA, WITHOUT LONG-TERM CURRENT USE OF INSULIN (HCC): ICD-10-CM

## 2024-02-28 DIAGNOSIS — I10 PRIMARY HYPERTENSION: ICD-10-CM

## 2024-02-28 LAB
ALBUMIN SERPL-MCNC: 4 G/DL (ref 3.2–4.6)
ALBUMIN/GLOB SERPL: 1.3 (ref 0.4–1.6)
ALP SERPL-CCNC: 73 U/L (ref 50–136)
ALT SERPL-CCNC: 34 U/L (ref 12–65)
ANION GAP SERPL CALC-SCNC: 5 MMOL/L (ref 2–11)
AST SERPL-CCNC: 21 U/L (ref 15–37)
BILIRUB SERPL-MCNC: 0.5 MG/DL (ref 0.2–1.1)
BUN SERPL-MCNC: 13 MG/DL (ref 8–23)
CALCIUM SERPL-MCNC: 9.7 MG/DL (ref 8.3–10.4)
CHLORIDE SERPL-SCNC: 95 MMOL/L (ref 103–113)
CHOLEST SERPL-MCNC: 107 MG/DL
CO2 SERPL-SCNC: 31 MMOL/L (ref 21–32)
CREAT SERPL-MCNC: 1 MG/DL (ref 0.8–1.5)
EST. AVERAGE GLUCOSE BLD GHB EST-MCNC: 160 MG/DL
GLOBULIN SER CALC-MCNC: 3 G/DL (ref 2.8–4.5)
GLUCOSE SERPL-MCNC: 143 MG/DL (ref 65–100)
HBA1C MFR BLD: 7.2 % (ref 4.8–5.6)
HDLC SERPL-MCNC: 32 MG/DL (ref 40–60)
HDLC SERPL: 3.3
LDLC SERPL CALC-MCNC: 35.4 MG/DL
MICROALB/CREAT RATIO, POC: ABNORMAL
MICROALBUMIN URINE, POC: 50 MG/L
POTASSIUM SERPL-SCNC: 4.2 MMOL/L (ref 3.5–5.1)
PROT SERPL-MCNC: 7 G/DL (ref 6.3–8.2)
SODIUM SERPL-SCNC: 131 MMOL/L (ref 136–146)
TRIGL SERPL-MCNC: 198 MG/DL (ref 35–150)
VLDLC SERPL CALC-MCNC: 39.6 MG/DL (ref 6–23)

## 2024-02-28 PROCEDURE — 82043 UR ALBUMIN QUANTITATIVE: CPT | Performed by: FAMILY MEDICINE

## 2024-03-19 DIAGNOSIS — E34.9 TESTOSTERONE DEFICIENCY: ICD-10-CM

## 2024-03-19 RX ORDER — TESTOSTERONE 20.25 MG/1.25G
GEL TOPICAL
Qty: 150 G | Refills: 0 | OUTPATIENT
Start: 2024-03-19

## 2024-03-22 ENCOUNTER — TELEMEDICINE (OUTPATIENT)
Dept: FAMILY MEDICINE CLINIC | Facility: CLINIC | Age: 59
End: 2024-03-22
Payer: COMMERCIAL

## 2024-03-22 DIAGNOSIS — I10 PRIMARY HYPERTENSION: ICD-10-CM

## 2024-03-22 DIAGNOSIS — E34.9 TESTOSTERONE DEFICIENCY: ICD-10-CM

## 2024-03-22 PROCEDURE — 99442 PR PHYS/QHP TELEPHONE EVALUATION 11-20 MIN: CPT | Performed by: FAMILY MEDICINE

## 2024-03-22 RX ORDER — TESTOSTERONE 16.2 MG/G
4 GEL TRANSDERMAL DAILY
Qty: 2 EACH | Refills: 5 | Status: SHIPPED | OUTPATIENT
Start: 2024-03-22 | End: 2024-04-21

## 2024-03-22 RX ORDER — ENALAPRIL MALEATE 10 MG/1
10 TABLET ORAL DAILY
Qty: 90 TABLET | Refills: 3 | Status: SHIPPED | OUTPATIENT
Start: 2024-03-22

## 2024-03-22 NOTE — PROGRESS NOTES
deficiency     controlled by medication    Vitamin D deficiency 06/11/2014    [facility] (Level 18)     Past Surgical History:   Procedure Laterality Date    COLONOSCOPY  07/17/2015    Dr. Hong-repeat in 10 years.    GI  early 1990s    colonoscopy and EGD    HEENT  2009    UP, for obstructive sleep apnea    NEUROLOGICAL SURGERY  3/2011 at [facility]    neck fusion C4-C5 with plate     ORTHOPEDIC SURGERY      left knee scope x 2    TOTAL KNEE ARTHROPLASTY Right 08/27/2019     Family History   Problem Relation Age of Onset    Hypertension Father     Hypertension Mother     Cancer Sister         breast    Heart Disease Sister     Stroke Sister     [place] Hypertherm Neg Hx     Pseudochol. Deficiency Neg Hx     Delayed Awakening Neg Hx     Post-op Nausea/Vomiting Neg Hx     Emergence Delirium Neg Hx     Post-op Cognitive Dysfunction Neg Hx     Colon Cancer Neg Hx     Other Neg Hx     Alzheimer's Disease Father      Social History     Tobacco Use    Smoking status: Never     Passive exposure: Never    Smokeless tobacco: Never   Substance Use Topics    Alcohol use: No         Review of Systems      OBJECTIVE:  There were no vitals taken for this visit.     Physical Exam     Medical problems and test results were reviewed with the patient today.     Recent Results (from the past 672 hour(s))   AMB POC URINE, MICROALBUMIN    Collection Time: 02/28/24  8:30 AM   Result Value Ref Range    Microalbumin urine, POC 50.0 MG/L    Microalb/Creat Ratio POC     Comprehensive Metabolic Panel    Collection Time: 02/28/24  8:32 AM   Result Value Ref Range    Sodium 131 (L) 136 - 146 mmol/L    Potassium 4.2 3.5 - 5.1 mmol/L    Chloride 95 (L) 103 - 113 mmol/L    CO2 31 21 - 32 mmol/L    Anion Gap 5 2 - 11 mmol/L    Glucose 143 (H) 65 - 100 mg/dL    BUN 13 8 - 23 MG/DL    Creatinine 1.00 0.8 - 1.5 MG/DL    Est, Glom Filt Rate >60 >60 ml/min/1.73m2    Calcium 9.7 8.3 - 10.4 MG/DL    Total Bilirubin 0.5 0.2 - 1.1 MG/DL    ALT 34 12 -

## 2024-03-29 ENCOUNTER — TELEPHONE (OUTPATIENT)
Dept: FAMILY MEDICINE CLINIC | Facility: CLINIC | Age: 59
End: 2024-03-29

## 2024-03-29 NOTE — TELEPHONE ENCOUNTER
Prior approval for testosterone 1.62 %  CaseId:[id number];Status:Approved;Review Type:Prior Auth;Coverage Start Date:02/25/2024;Coverage End Date:03/26/2025;  Authorization Expiration Date: 3/25/2025

## 2024-06-26 NOTE — PROGRESS NOTES
normal.     NEURO:   The patient is alert and oriented to person, place, and time.  Memory appears intact and mood is normal.  No gross sensorimotor deficits are present.          ASSESSMENT:  (Medical Decision Making)      Diagnosis Orders   1. JENIFER (obstructive sleep apnea) -pt to continue PAP therapy, supplies ordered. He is benefiting and tolerating PAP therapy.  DME - DURABLE MEDICAL EQUIPMENT      2. Obesity, Class III, BMI 40-49.9 (morbid obesity) (HCC) -weight loss encouraged, pt is considering wegovy or ozempic in the future to help with insulin resistance and weight loss.             PLAN:  Pt to continue PAP therapy  Supplies ordered  Follow up in one year or sooner if needed     Orders Placed This Encounter   Procedures    DME - DURABLE MEDICAL EQUIPMENT     [facility]  Phone: [address] [place] [address]  Dept: [phone]      Patient Name: Ian Sanches  : 1965  Gender: male  Address: [address] [place] [address] [address] [place] [address]  Patient phone: [phone] (home)       Primary Insurance: Payor: [employer] / Plan: [employer] [place] / Product Type: *No Product type* /   Subscriber ID: [id number] - ([employer])      AMB Supply Order  Order Details     DME Location:[facility]   Order Date: 2024   There were no encounter diagnoses.             (  X   )Supplies Needed        Machine   (     ) CPAP Unit  (     ) Auto CPAP Unit  (     ) BiLevel Unit  (     ) Auto BiLevel Unit  (     ) ASV   (     ) Bilevel ST      Length of need: 12 months    Pressure:   cmH20  EPR:      Starting Ramp Pressure:   cm H20  Ramp Time: min      Patient had a diagnostic Apnea Hypopnea Index (AHI) of :    *SUPPLIES* Replace all as needed, or per coverage guidelines     Masks Type:  ( x   ) -Full Face Mask (1 per 3 mon)  (  x  ) -Full Mask (1 per month) Interface/Cushion      (  ) -Nasal Mask (1 per 3 mon)  (  ) - Nasal Mask (1

## 2024-06-27 ENCOUNTER — OFFICE VISIT (OUTPATIENT)
Dept: SLEEP MEDICINE | Age: 59
End: 2024-06-27
Payer: COMMERCIAL

## 2024-06-27 VITALS
OXYGEN SATURATION: 96 % | HEART RATE: 86 BPM | SYSTOLIC BLOOD PRESSURE: 128 MMHG | WEIGHT: 287 LBS | BODY MASS INDEX: 42.51 KG/M2 | HEIGHT: 69 IN | RESPIRATION RATE: 17 BRPM | TEMPERATURE: 97.4 F | DIASTOLIC BLOOD PRESSURE: 74 MMHG

## 2024-06-27 DIAGNOSIS — E66.01 OBESITY, CLASS III, BMI 40-49.9 (MORBID OBESITY) (HCC): ICD-10-CM

## 2024-06-27 DIAGNOSIS — G47.33 OSA (OBSTRUCTIVE SLEEP APNEA): Primary | ICD-10-CM

## 2024-06-27 PROCEDURE — 99213 OFFICE O/P EST LOW 20 MIN: CPT | Performed by: PHYSICIAN ASSISTANT

## 2024-06-27 PROCEDURE — 3078F DIAST BP <80 MM HG: CPT | Performed by: PHYSICIAN ASSISTANT

## 2024-06-27 PROCEDURE — 3074F SYST BP LT 130 MM HG: CPT | Performed by: PHYSICIAN ASSISTANT

## 2024-06-27 ASSESSMENT — SLEEP AND FATIGUE QUESTIONNAIRES
HOW LIKELY ARE YOU TO NOD OFF OR FALL ASLEEP WHILE LYING DOWN TO REST IN THE AFTERNOON WHEN CIRCUMSTANCES PERMIT: WOULD NEVER DOZE
HOW LIKELY ARE YOU TO NOD OFF OR FALL ASLEEP WHILE SITTING AND READING: WOULD NEVER DOZE
HOW LIKELY ARE YOU TO NOD OFF OR FALL ASLEEP WHILE SITTING INACTIVE IN A PUBLIC PLACE: WOULD NEVER DOZE
HOW LIKELY ARE YOU TO NOD OFF OR FALL ASLEEP WHEN YOU ARE A PASSENGER IN A CAR FOR AN HOUR WITHOUT A BREAK: WOULD NEVER DOZE
HOW LIKELY ARE YOU TO NOD OFF OR FALL ASLEEP WHILE SITTING AND TALKING TO SOMEONE: WOULD NEVER DOZE
HOW LIKELY ARE YOU TO NOD OFF OR FALL ASLEEP IN A CAR, WHILE STOPPED FOR A FEW MINUTES IN TRAFFIC: WOULD NEVER DOZE
ESS TOTAL SCORE: 0
HOW LIKELY ARE YOU TO NOD OFF OR FALL ASLEEP WHILE WATCHING TV: WOULD NEVER DOZE
HOW LIKELY ARE YOU TO NOD OFF OR FALL ASLEEP WHILE SITTING QUIETLY AFTER LUNCH WITHOUT ALCOHOL: WOULD NEVER DOZE

## 2024-06-27 NOTE — PATIENT INSTRUCTIONS
The company who will be taking care of your CPAP supplies is:    Address: [address], [place] [address]  Phone: [phone] Option #1  Fax: [phone]

## 2024-09-09 DIAGNOSIS — E11.65 TYPE 2 DIABETES MELLITUS WITH HYPERGLYCEMIA, WITHOUT LONG-TERM CURRENT USE OF INSULIN (HCC): ICD-10-CM

## 2024-09-09 RX ORDER — SITAGLIPTIN AND METFORMIN HYDROCHLORIDE 1000; 50 MG/1; MG/1
1 TABLET, FILM COATED, EXTENDED RELEASE ORAL 2 TIMES DAILY
Qty: 60 TABLET | Refills: 0 | Status: SHIPPED | OUTPATIENT
Start: 2024-09-09

## 2024-09-25 ENCOUNTER — LAB (OUTPATIENT)
Dept: FAMILY MEDICINE CLINIC | Facility: CLINIC | Age: 59
End: 2024-09-25
Payer: COMMERCIAL

## 2024-09-25 DIAGNOSIS — E55.9 VITAMIN D DEFICIENCY: ICD-10-CM

## 2024-09-25 DIAGNOSIS — Z00.00 LABORATORY EXAMINATION ORDERED AS PART OF A ROUTINE GENERAL MEDICAL EXAMINATION: Primary | ICD-10-CM

## 2024-09-25 DIAGNOSIS — Z12.5 SPECIAL SCREENING FOR MALIGNANT NEOPLASM OF PROSTATE: ICD-10-CM

## 2024-09-25 DIAGNOSIS — E11.65 TYPE 2 DIABETES MELLITUS WITH HYPERGLYCEMIA, WITHOUT LONG-TERM CURRENT USE OF INSULIN (HCC): ICD-10-CM

## 2024-09-25 LAB
25(OH)D3 SERPL-MCNC: 37.4 NG/ML (ref 30–100)
ALBUMIN SERPL-MCNC: 4.3 G/DL (ref 3.2–4.6)
ALBUMIN/GLOB SERPL: 1.3 (ref 1–1.9)
ALP SERPL-CCNC: 78 U/L (ref 40–129)
ALT SERPL-CCNC: 41 U/L (ref 8–55)
ANION GAP SERPL CALC-SCNC: 15 MMOL/L (ref 9–18)
AST SERPL-CCNC: 39 U/L (ref 15–37)
BILIRUB SERPL-MCNC: 0.4 MG/DL (ref 0–1.2)
BILIRUBIN, URINE, POC: NEGATIVE
BLOOD URINE, POC: NEGATIVE
BUN SERPL-MCNC: 16 MG/DL (ref 8–23)
CALCIUM SERPL-MCNC: 9.8 MG/DL (ref 8.8–10.2)
CHLORIDE SERPL-SCNC: 91 MMOL/L (ref 98–107)
CHOLEST SERPL-MCNC: 96 MG/DL (ref 0–200)
CO2 SERPL-SCNC: 28 MMOL/L (ref 20–28)
CREAT SERPL-MCNC: 1.04 MG/DL (ref 0.8–1.3)
EST. AVERAGE GLUCOSE BLD GHB EST-MCNC: 214 MG/DL
GLOBULIN SER CALC-MCNC: 3.2 G/DL (ref 2.3–3.5)
GLUCOSE SERPL-MCNC: 227 MG/DL (ref 70–99)
GLUCOSE URINE, POC: NEGATIVE
GRANS ABSOLUTE, POC: 6.1 K/UL
GRANULOCYTES %, POC: 68.8 %
HBA1C MFR BLD: 9.1 % (ref 0–5.6)
HDLC SERPL-MCNC: 30 MG/DL (ref 40–60)
HDLC SERPL: 3.2 (ref 0–5)
HEMATOCRIT, POC: 39.6 %
HEMOGLOBIN, POC: 12.7 G/DL
KETONES, URINE, POC: ABNORMAL
LDLC SERPL CALC-MCNC: 34 MG/DL (ref 0–100)
LEUKOCYTE ESTERASE, URINE, POC: NEGATIVE
LYMPHOCYTE %, POC: 24.8 %
LYMPHS ABSOLUTE, POC: 2.2 K/UL
MCH, POC: 27.6 PG (ref 20–?)
MCHC, POC: 32.1
MCV, POC: 86.1
MICROALBUMIN URINE, POC: 100 MG/L
MONOCYTE %, POC: 6.4 %
MONOCYTE, ABSOLUTE POC: 0.6 K/UL
MPV, POC: 7.3 FL
NITRITE, URINE, POC: NEGATIVE
PH, URINE, POC: 6.5 (ref 4.6–8)
PLATELET COUNT, POC: ABNORMAL K/UL
POTASSIUM SERPL-SCNC: 3.5 MMOL/L (ref 3.5–5.1)
PROT SERPL-MCNC: 7.4 G/DL (ref 6.3–8.2)
PROTEIN,URINE, POC: ABNORMAL
PSA SERPL-MCNC: 1 NG/ML (ref 0–4)
RBC, POC: 4.6 M/UL
RDW, POC: 14 %
SODIUM SERPL-SCNC: 133 MMOL/L (ref 136–145)
SPECIFIC GRAVITY, URINE, POC: 1.02 (ref 1–1.03)
TRIGL SERPL-MCNC: 158 MG/DL (ref 0–150)
TSH, 3RD GENERATION: 2.05 UIU/ML (ref 0.27–4.2)
URINALYSIS CLARITY, POC: CLEAR
URINALYSIS COLOR, POC: YELLOW
UROBILINOGEN, POC: NORMAL
VLDLC SERPL CALC-MCNC: 32 MG/DL (ref 6–23)
WBC, POC: 8.8 K/UL

## 2024-09-25 PROCEDURE — 85025 COMPLETE CBC W/AUTO DIFF WBC: CPT | Performed by: FAMILY MEDICINE

## 2024-09-25 PROCEDURE — 81003 URINALYSIS AUTO W/O SCOPE: CPT | Performed by: FAMILY MEDICINE

## 2024-09-25 PROCEDURE — 82044 UR ALBUMIN SEMIQUANTITATIVE: CPT | Performed by: FAMILY MEDICINE

## 2024-10-08 ASSESSMENT — PATIENT HEALTH QUESTIONNAIRE - PHQ9
2. FEELING DOWN, DEPRESSED OR HOPELESS: NOT AT ALL
1. LITTLE INTEREST OR PLEASURE IN DOING THINGS: NOT AT ALL
1. LITTLE INTEREST OR PLEASURE IN DOING THINGS: NOT AT ALL
SUM OF ALL RESPONSES TO PHQ9 QUESTIONS 1 & 2: 0
SUM OF ALL RESPONSES TO PHQ QUESTIONS 1-9: 0
2. FEELING DOWN, DEPRESSED OR HOPELESS: NOT AT ALL
SUM OF ALL RESPONSES TO PHQ QUESTIONS 1-9: 0
SUM OF ALL RESPONSES TO PHQ9 QUESTIONS 1 & 2: 0

## 2024-10-11 ENCOUNTER — OFFICE VISIT (OUTPATIENT)
Dept: FAMILY MEDICINE CLINIC | Facility: CLINIC | Age: 59
End: 2024-10-11
Payer: COMMERCIAL

## 2024-10-11 VITALS
WEIGHT: 287 LBS | HEART RATE: 68 BPM | BODY MASS INDEX: 42.51 KG/M2 | DIASTOLIC BLOOD PRESSURE: 72 MMHG | HEIGHT: 69 IN | SYSTOLIC BLOOD PRESSURE: 120 MMHG

## 2024-10-11 DIAGNOSIS — E66.01 MORBID OBESITY: ICD-10-CM

## 2024-10-11 DIAGNOSIS — I10 PRIMARY HYPERTENSION: ICD-10-CM

## 2024-10-11 DIAGNOSIS — N52.9 ERECTILE DYSFUNCTION, UNSPECIFIED ERECTILE DYSFUNCTION TYPE: ICD-10-CM

## 2024-10-11 DIAGNOSIS — E34.9 TESTOSTERONE DEFICIENCY: ICD-10-CM

## 2024-10-11 DIAGNOSIS — E11.65 TYPE 2 DIABETES MELLITUS WITH HYPERGLYCEMIA, WITHOUT LONG-TERM CURRENT USE OF INSULIN (HCC): ICD-10-CM

## 2024-10-11 DIAGNOSIS — Z13.31 SCREENING FOR DEPRESSION: ICD-10-CM

## 2024-10-11 DIAGNOSIS — Z00.00 ROUTINE GENERAL MEDICAL EXAMINATION AT A HEALTH CARE FACILITY: Primary | ICD-10-CM

## 2024-10-11 DIAGNOSIS — E78.2 MIXED HYPERLIPIDEMIA: ICD-10-CM

## 2024-10-11 PROCEDURE — 99396 PREV VISIT EST AGE 40-64: CPT | Performed by: FAMILY MEDICINE

## 2024-10-11 PROCEDURE — 3078F DIAST BP <80 MM HG: CPT | Performed by: FAMILY MEDICINE

## 2024-10-11 PROCEDURE — 3074F SYST BP LT 130 MM HG: CPT | Performed by: FAMILY MEDICINE

## 2024-10-11 RX ORDER — TESTOSTERONE 1.62 MG/G
4 GEL TRANSDERMAL DAILY
Qty: 2 EACH | Refills: 5 | Status: SHIPPED | OUTPATIENT
Start: 2024-10-11 | End: 2024-11-10

## 2024-10-11 RX ORDER — ENALAPRIL MALEATE 10 MG/1
10 TABLET ORAL DAILY
Qty: 90 TABLET | Refills: 3 | Status: SHIPPED | OUTPATIENT
Start: 2024-10-11

## 2024-10-11 RX ORDER — SILDENAFIL 50 MG/1
50 TABLET, FILM COATED ORAL PRN
Qty: 90 TABLET | Refills: 1 | Status: SHIPPED | OUTPATIENT
Start: 2024-10-11

## 2024-10-11 RX ORDER — PIOGLITAZONEHYDROCHLORIDE 30 MG/1
30 TABLET ORAL DAILY
Qty: 90 TABLET | Refills: 3 | Status: SHIPPED | OUTPATIENT
Start: 2024-10-11

## 2024-10-11 RX ORDER — SITAGLIPTIN AND METFORMIN HYDROCHLORIDE 1000; 50 MG/1; MG/1
1 TABLET, FILM COATED, EXTENDED RELEASE ORAL 2 TIMES DAILY
Qty: 60 TABLET | Refills: 0 | Status: SHIPPED | OUTPATIENT
Start: 2024-10-11

## 2024-10-11 RX ORDER — PROPRANOLOL HYDROCHLORIDE 40 MG/1
40 TABLET ORAL 2 TIMES DAILY
Qty: 180 TABLET | Refills: 3 | Status: SHIPPED | OUTPATIENT
Start: 2024-10-11

## 2024-10-11 RX ORDER — ASPIRIN 81 MG/1
81 TABLET ORAL EVERY 12 HOURS
Qty: 90 TABLET | Refills: 3 | Status: SHIPPED | OUTPATIENT
Start: 2024-10-11

## 2024-10-11 RX ORDER — CHLORTHALIDONE 25 MG/1
25 TABLET ORAL DAILY
Qty: 90 TABLET | Refills: 3 | Status: SHIPPED | OUTPATIENT
Start: 2024-10-11

## 2024-10-11 RX ORDER — ROSUVASTATIN CALCIUM 40 MG/1
40 TABLET, COATED ORAL DAILY
Qty: 90 TABLET | Refills: 3 | Status: SHIPPED | OUTPATIENT
Start: 2024-10-11

## 2024-10-11 SDOH — ECONOMIC STABILITY: FOOD INSECURITY: WITHIN THE PAST 12 MONTHS, YOU WORRIED THAT YOUR FOOD WOULD RUN OUT BEFORE YOU GOT MONEY TO BUY MORE.: NEVER TRUE

## 2024-10-11 SDOH — ECONOMIC STABILITY: FOOD INSECURITY: WITHIN THE PAST 12 MONTHS, THE FOOD YOU BOUGHT JUST DIDN'T LAST AND YOU DIDN'T HAVE MONEY TO GET MORE.: NEVER TRUE

## 2024-10-11 SDOH — ECONOMIC STABILITY: INCOME INSECURITY: HOW HARD IS IT FOR YOU TO PAY FOR THE VERY BASICS LIKE FOOD, HOUSING, MEDICAL CARE, AND HEATING?: NOT HARD AT ALL

## 2024-10-11 ASSESSMENT — ENCOUNTER SYMPTOMS
WHEEZING: 0
VOMITING: 0
SHORTNESS OF BREATH: 0
SORE THROAT: 0
DIARRHEA: 0
NAUSEA: 0
COUGH: 0
ABDOMINAL PAIN: 0
CONSTIPATION: 0

## 2024-10-11 NOTE — PROGRESS NOTES
PROGRESS NOTE    SUBJECTIVE:   Ian Sanches is a 59 y.o. male seen for a follow up visit regarding the following chief complaint:     Chief Complaint   Patient presents with    Annual Exam     [id number] refills and pharmacy confirmed.            HPI patient presents office today for complete physical states that he cannot lose weight despite going on Golo diet states he cannot exercise because of his left foot hurts      Past Medical History, Past Surgical History, Family history, Social History, and Medications were all reviewed with the patient today and updated as necessary.       Current Outpatient Medications   Medication Sig Dispense Refill    aspirin 81 MG EC tablet Take 1 tablet by mouth in the morning and 1 tablet in the evening. 90 tablet 3    chlorthalidone (HYGROTON) 25 MG tablet Take 1 tablet by mouth daily 90 tablet 3    enalapril (VASOTEC) 10 MG tablet Take 1 tablet by mouth daily 90 tablet 3    pioglitazone (ACTOS) 30 MG tablet Take 1 tablet by mouth daily 90 tablet 3    propranolol (INDERAL) 40 MG tablet Take 1 tablet by mouth 2 times daily Take 1 tablet 2 times a day 180 tablet 3    rosuvastatin (CRESTOR) 40 MG tablet Take 1 tablet by mouth daily 90 tablet 3    sildenafil (VIAGRA) 50 MG tablet Take 1 tablet by mouth as needed for Erectile Dysfunction 90 tablet 1    SITagliptin-metFORMIN (JANUMET XR)  MG TB24 per extended release tablet Take 1 tablet by mouth in the morning and at bedtime 60 tablet 0    Testosterone (ANDROGEL) 20.25 MG/ACT (1.62%) GEL gel Place 4 actuation onto the skin daily for 30 days. 2 each 5     No current facility-administered medications for this visit.     Allergies   Allergen Reactions    Hydromorphone Other (See Comments)     Caused pt to go into A FIB for short time    Oxycodone-Acetaminophen Other (See Comments)     \"aggitation\"     Rosuvastatin Other (See Comments)     Pt still takes this Med     Patient Active Problem List   Diagnosis    Arthritis

## 2024-10-24 RX ORDER — SEMAGLUTIDE 0.68 MG/ML
0.03 INJECTION, SOLUTION SUBCUTANEOUS
Qty: 3 ML | Refills: 5 | Status: SHIPPED | OUTPATIENT
Start: 2024-10-24

## 2024-11-11 DIAGNOSIS — E11.65 TYPE 2 DIABETES MELLITUS WITH HYPERGLYCEMIA, WITHOUT LONG-TERM CURRENT USE OF INSULIN (HCC): ICD-10-CM

## 2024-11-12 RX ORDER — SITAGLIPTIN AND METFORMIN HYDROCHLORIDE 1000; 50 MG/1; MG/1
1 TABLET, FILM COATED, EXTENDED RELEASE ORAL 2 TIMES DAILY
Qty: 180 TABLET | Refills: 0 | Status: SHIPPED | OUTPATIENT
Start: 2024-11-12 | End: 2025-02-10

## 2024-12-20 ENCOUNTER — TELEPHONE (OUTPATIENT)
Dept: FAMILY MEDICINE CLINIC | Facility: CLINIC | Age: 59
End: 2024-12-20

## 2024-12-20 NOTE — TELEPHONE ENCOUNTER
Called patient regard status on Rx prior  approval on ozempic still waiting on a final decision  from his insurance .

## 2024-12-24 ENCOUNTER — TELEPHONE (OUTPATIENT)
Dept: FAMILY MEDICINE CLINIC | Facility: CLINIC | Age: 59
End: 2024-12-24

## 2024-12-24 NOTE — TELEPHONE ENCOUNTER
[place] CaseId:[id number];Status:Approved;Review Type:Prior Auth;Coverage Start Date:11/17/2024;Coverage End Date:12/22/2025;. Authorization Expiration Date: December 22, 2025.   Patient notified

## 2025-02-04 ENCOUNTER — LAB (OUTPATIENT)
Dept: FAMILY MEDICINE CLINIC | Facility: CLINIC | Age: 60
End: 2025-02-04

## 2025-02-04 DIAGNOSIS — E34.9 TESTOSTERONE DEFICIENCY: ICD-10-CM

## 2025-02-04 DIAGNOSIS — E11.65 TYPE 2 DIABETES MELLITUS WITH HYPERGLYCEMIA, WITHOUT LONG-TERM CURRENT USE OF INSULIN (HCC): Primary | ICD-10-CM

## 2025-02-04 LAB
EST. AVERAGE GLUCOSE BLD GHB EST-MCNC: 172 MG/DL
HBA1C MFR BLD: 7.6 % (ref 0–5.6)

## 2025-02-07 LAB
TESTOST FREE SERPL-MCNC: 9.2 PG/ML (ref 6.6–18.1)
TESTOST SERPL-MCNC: 283 NG/DL (ref 264–916)

## 2025-02-07 SDOH — ECONOMIC STABILITY: TRANSPORTATION INSECURITY
IN THE PAST 12 MONTHS, HAS THE LACK OF TRANSPORTATION KEPT YOU FROM MEDICAL APPOINTMENTS OR FROM GETTING MEDICATIONS?: NO

## 2025-02-07 SDOH — ECONOMIC STABILITY: INCOME INSECURITY: IN THE LAST 12 MONTHS, WAS THERE A TIME WHEN YOU WERE NOT ABLE TO PAY THE MORTGAGE OR RENT ON TIME?: NO

## 2025-02-07 SDOH — ECONOMIC STABILITY: TRANSPORTATION INSECURITY
IN THE PAST 12 MONTHS, HAS LACK OF TRANSPORTATION KEPT YOU FROM MEETINGS, WORK, OR FROM GETTING THINGS NEEDED FOR DAILY LIVING?: NO

## 2025-02-07 SDOH — ECONOMIC STABILITY: FOOD INSECURITY: WITHIN THE PAST 12 MONTHS, THE FOOD YOU BOUGHT JUST DIDN'T LAST AND YOU DIDN'T HAVE MONEY TO GET MORE.: PATIENT DECLINED

## 2025-02-07 SDOH — ECONOMIC STABILITY: FOOD INSECURITY: WITHIN THE PAST 12 MONTHS, YOU WORRIED THAT YOUR FOOD WOULD RUN OUT BEFORE YOU GOT MONEY TO BUY MORE.: PATIENT DECLINED

## 2025-02-07 ASSESSMENT — PATIENT HEALTH QUESTIONNAIRE - PHQ9
SUM OF ALL RESPONSES TO PHQ QUESTIONS 1-9: 0
1. LITTLE INTEREST OR PLEASURE IN DOING THINGS: NOT AT ALL
1. LITTLE INTEREST OR PLEASURE IN DOING THINGS: NOT AT ALL
SUM OF ALL RESPONSES TO PHQ9 QUESTIONS 1 & 2: 0
SUM OF ALL RESPONSES TO PHQ QUESTIONS 1-9: 0
2. FEELING DOWN, DEPRESSED OR HOPELESS: NOT AT ALL
2. FEELING DOWN, DEPRESSED OR HOPELESS: NOT AT ALL
SUM OF ALL RESPONSES TO PHQ9 QUESTIONS 1 & 2: 0
SUM OF ALL RESPONSES TO PHQ QUESTIONS 1-9: 0
SUM OF ALL RESPONSES TO PHQ QUESTIONS 1-9: 0

## 2025-02-10 ENCOUNTER — OFFICE VISIT (OUTPATIENT)
Dept: FAMILY MEDICINE CLINIC | Facility: CLINIC | Age: 60
End: 2025-02-10
Payer: COMMERCIAL

## 2025-02-10 VITALS
SYSTOLIC BLOOD PRESSURE: 122 MMHG | HEIGHT: 69 IN | WEIGHT: 286 LBS | HEART RATE: 81 BPM | BODY MASS INDEX: 42.36 KG/M2 | DIASTOLIC BLOOD PRESSURE: 78 MMHG

## 2025-02-10 DIAGNOSIS — E34.9 TESTOSTERONE DEFICIENCY: ICD-10-CM

## 2025-02-10 DIAGNOSIS — E11.65 TYPE 2 DIABETES MELLITUS WITH HYPERGLYCEMIA, WITHOUT LONG-TERM CURRENT USE OF INSULIN (HCC): ICD-10-CM

## 2025-02-10 DIAGNOSIS — N52.9 ERECTILE DYSFUNCTION, UNSPECIFIED ERECTILE DYSFUNCTION TYPE: ICD-10-CM

## 2025-02-10 DIAGNOSIS — I10 PRIMARY HYPERTENSION: ICD-10-CM

## 2025-02-10 PROCEDURE — 3074F SYST BP LT 130 MM HG: CPT | Performed by: FAMILY MEDICINE

## 2025-02-10 PROCEDURE — 99214 OFFICE O/P EST MOD 30 MIN: CPT | Performed by: FAMILY MEDICINE

## 2025-02-10 PROCEDURE — 3051F HG A1C>EQUAL 7.0%<8.0%: CPT | Performed by: FAMILY MEDICINE

## 2025-02-10 PROCEDURE — 3078F DIAST BP <80 MM HG: CPT | Performed by: FAMILY MEDICINE

## 2025-02-10 RX ORDER — SILDENAFIL 50 MG/1
TABLET, FILM COATED ORAL
Qty: 90 TABLET | Refills: 1 | Status: SHIPPED | OUTPATIENT
Start: 2025-02-10

## 2025-02-10 RX ORDER — SITAGLIPTIN AND METFORMIN HYDROCHLORIDE 1000; 50 MG/1; MG/1
1 TABLET, FILM COATED, EXTENDED RELEASE ORAL 2 TIMES DAILY
Qty: 180 TABLET | Refills: 3 | Status: SHIPPED | OUTPATIENT
Start: 2025-02-10 | End: 2025-05-11

## 2025-02-10 RX ORDER — TESTOSTERONE 1.62 MG/G
4 GEL TRANSDERMAL DAILY
Qty: 2 EACH | Refills: 5 | Status: SHIPPED | OUTPATIENT
Start: 2025-02-10 | End: 2025-03-12

## 2025-02-10 RX ORDER — SILDENAFIL 50 MG/1
50 TABLET, FILM COATED ORAL PRN
Qty: 90 TABLET | Refills: 1 | Status: SHIPPED | OUTPATIENT
Start: 2025-02-10 | End: 2025-02-10 | Stop reason: SDUPTHER

## 2025-02-10 ASSESSMENT — ENCOUNTER SYMPTOMS
NAUSEA: 0
VOMITING: 0
SHORTNESS OF BREATH: 0

## 2025-02-10 NOTE — PROGRESS NOTES
PROGRESS NOTE    SUBJECTIVE:   Ian Sanches is a 60 y.o. male seen for a follow up visit regarding the following chief complaint:     Chief Complaint   Patient presents with    Follow-up    Other           HPI patient presents the office today for follow-up of labs without any new complaints other than he wants to start losing weight been on Ozempic for quite some time has gone up on the dose and wants to know more about weight loss      Past Medical History, Past Surgical History, Family history, Social History, and Medications were all reviewed with the patient today and updated as necessary.       Current Outpatient Medications   Medication Sig Dispense Refill    sildenafil (VIAGRA) 50 MG tablet Take 1 tablet by mouth as needed for Erectile Dysfunction 90 tablet 1    SITagliptin-metFORMIN (JANUMET XR)  MG TB24 per extended release tablet Take 1 tablet by mouth in the morning and at bedtime 180 tablet 3    Testosterone (ANDROGEL) 20.25 MG/ACT (1.62%) GEL gel Place 4 actuation onto the skin daily for 30 days. 2 each 5    Semaglutide,0.25 or 0.5MG/DOS, 2 MG/3ML SOPN Inject 0.5 mg into the skin every 7 days 3 mL 5    Semaglutide,0.25 or 0.5MG/DOS, (OZEMPIC, 0.25 OR 0.5 MG/DOSE,) 2 MG/3ML SOPN Inject 0.025 mg into the skin every 7 days 3 mL 5    aspirin 81 MG EC tablet Take 1 tablet by mouth in the morning and 1 tablet in the evening. 90 tablet 3    chlorthalidone (HYGROTON) 25 MG tablet Take 1 tablet by mouth daily 90 tablet 3    enalapril (VASOTEC) 10 MG tablet Take 1 tablet by mouth daily 90 tablet 3    pioglitazone (ACTOS) 30 MG tablet Take 1 tablet by mouth daily 90 tablet 3    propranolol (INDERAL) 40 MG tablet Take 1 tablet by mouth 2 times daily Take 1 tablet 2 times a day 180 tablet 3    rosuvastatin (CRESTOR) 40 MG tablet Take 1 tablet by mouth daily 90 tablet 3     No current facility-administered medications for this visit.     Allergies   Allergen Reactions    Hydromorphone Other (See Comments)

## 2025-06-24 NOTE — PROGRESS NOTES
[facility]  [address] , [address]  [place] [address]  [phone]    Patient Name:  Ian Sanches  YOB: 1965      Office Visit 6/25/2025    CHIEF COMPLAINT:    Chief Complaint   Patient presents with    CPAP/BiPAP    Sleep Apnea    1 Year Follow Up         HISTORY OF PRESENT ILLNESS:       Patient is an 60 y.o. male seen today for follow up of JENIFER. Pt had a PSG/HST on 10/8/19 with an AHI of 40.9/hr with desaturations to 78%. Pt is on CPAP 8-12 cm H2O.  His download indicates excellent compliance at 98%.  His average daily use is 10 hours nightly and his pressure requirement is between 10.8 and 12 cm.  His AHI is well-controlled at 1.6/hour.  He has a median leak of 5.5 L/min.  Therefore, he is using and benefiting from his CPAP machine and compliant with therapy as a result.  He indicated he is using a fullface mask and he will need renewal for his mask and supply which will be sent today.  His [place] score is 0.  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  He learned that he he needed to wear for third mask to help prevent inhaling this particle.  No significant change in his medication or health from last office visit a year ago.          6/25/2025    10:20 AM 6/27/2024    11:15 AM 6/16/2023     3:02 PM 6/14/2023     4:04 PM   Sleep Medicine   Sitting and reading 0 0 0 0   Watching TV 0 0 0 0   Sitting, inactive in a public place (e.g. a theatre or a meeting) 0 0 0 0   As a passenger in a car for an hour without a break 0 0 0 0   Lying down to rest in the afternoon when circumstances permit 0 0 0 0   Sitting and talking to someone 0 0 0 0   Sitting quietly after a lunch without alcohol 0 0 0 0   In a car, while stopped for a few minutes in traffic 0 0 0 0   [place] Sleepiness Score 0 0 0 0     Past Medical History:   Diagnosis

## 2025-06-25 ENCOUNTER — OFFICE VISIT (OUTPATIENT)
Age: 60
End: 2025-06-25
Payer: COMMERCIAL

## 2025-06-25 VITALS
DIASTOLIC BLOOD PRESSURE: 60 MMHG | WEIGHT: 278.8 LBS | OXYGEN SATURATION: 95 % | RESPIRATION RATE: 16 BRPM | HEIGHT: 69 IN | HEART RATE: 91 BPM | SYSTOLIC BLOOD PRESSURE: 122 MMHG | BODY MASS INDEX: 41.29 KG/M2

## 2025-06-25 DIAGNOSIS — G47.33 OSA ON CPAP: Primary | ICD-10-CM

## 2025-06-25 DIAGNOSIS — E66.813 OBESITY, CLASS 3 (HCC): ICD-10-CM

## 2025-06-25 PROCEDURE — 3078F DIAST BP <80 MM HG: CPT | Performed by: INTERNAL MEDICINE

## 2025-06-25 PROCEDURE — 3074F SYST BP LT 130 MM HG: CPT | Performed by: INTERNAL MEDICINE

## 2025-06-25 PROCEDURE — 99214 OFFICE O/P EST MOD 30 MIN: CPT | Performed by: INTERNAL MEDICINE

## 2025-06-25 PROCEDURE — G2211 COMPLEX E/M VISIT ADD ON: HCPCS | Performed by: INTERNAL MEDICINE

## 2025-06-25 ASSESSMENT — SLEEP AND FATIGUE QUESTIONNAIRES
ESS TOTAL SCORE: 0
HOW LIKELY ARE YOU TO NOD OFF OR FALL ASLEEP IN A CAR, WHILE STOPPED FOR A FEW MINUTES IN TRAFFIC: WOULD NEVER DOZE
HOW LIKELY ARE YOU TO NOD OFF OR FALL ASLEEP WHEN YOU ARE A PASSENGER IN A CAR FOR AN HOUR WITHOUT A BREAK: WOULD NEVER DOZE
HOW LIKELY ARE YOU TO NOD OFF OR FALL ASLEEP WHILE SITTING QUIETLY AFTER LUNCH WITHOUT ALCOHOL: WOULD NEVER DOZE
HOW LIKELY ARE YOU TO NOD OFF OR FALL ASLEEP WHILE WATCHING TV: WOULD NEVER DOZE
HOW LIKELY ARE YOU TO NOD OFF OR FALL ASLEEP WHILE SITTING INACTIVE IN A PUBLIC PLACE: WOULD NEVER DOZE
HOW LIKELY ARE YOU TO NOD OFF OR FALL ASLEEP WHILE LYING DOWN TO REST IN THE AFTERNOON WHEN CIRCUMSTANCES PERMIT: WOULD NEVER DOZE
HOW LIKELY ARE YOU TO NOD OFF OR FALL ASLEEP WHILE SITTING AND TALKING TO SOMEONE: WOULD NEVER DOZE
HOW LIKELY ARE YOU TO NOD OFF OR FALL ASLEEP WHILE SITTING AND READING: WOULD NEVER DOZE

## 2025-07-28 RX ORDER — SEMAGLUTIDE 0.68 MG/ML
0.03 INJECTION, SOLUTION SUBCUTANEOUS
Qty: 3 ML | Refills: 1 | Status: SHIPPED | OUTPATIENT
Start: 2025-07-28 | End: 2025-08-01 | Stop reason: DRUGHIGH

## (undated) DEVICE — STRYKER PERFORMANCE SERIES SAGITTAL BLADE: Brand: STRYKER PERFORMANCE SERIES

## (undated) DEVICE — TRAY PREP DRY W/ PREM GLV 2 APPL 6 SPNG 2 UNDPD 1 OVERWRAP

## (undated) DEVICE — NEEDLE HYPO 21GA L1.5IN INTRAMUSCULAR S STL LATCH BVL UP

## (undated) DEVICE — SUTURE VCRL SZ 1 L27IN ABSRB UD L36MM CP-1 1/2 CIR REV CUT J268H

## (undated) DEVICE — CURETTE BNE CEM 10IN DISP --

## (undated) DEVICE — HANDPIECE SET WITH COAXIAL HIGH FLOW TIP AND SUCTION TUBE: Brand: INTERPULSE

## (undated) DEVICE — SYR 50ML LR LCK 1ML GRAD NSAF --

## (undated) DEVICE — SOLUTION IRRIG 3000ML 0.9% SOD CHL FLX CONT 0797208] ICU MEDICAL INC]

## (undated) DEVICE — DRAPE,TOP,102X53,STERILE: Brand: MEDLINE

## (undated) DEVICE — MEDI-VAC YANKAUER SUCTION HANDLE W/BULBOUS TIP: Brand: CARDINAL HEALTH

## (undated) DEVICE — 2000CC GUARDIAN II: Brand: GUARDIAN

## (undated) DEVICE — TRAY CATH 16F DRN BG LTX -- CONVERT TO ITEM 363158

## (undated) DEVICE — TOTAL KNEE DR KAVOLUS: Brand: MEDLINE INDUSTRIES, INC.

## (undated) DEVICE — BANDAGE COMPR SELF ADH 5 YDX4 IN TAN STRL PREMIERPRO LF

## (undated) DEVICE — REM POLYHESIVE ADULT PATIENT RETURN ELECTRODE: Brand: VALLEYLAB

## (undated) DEVICE — BIPOLAR SEALER 23-112-1 AQM 6.0: Brand: AQUAMANTYS ®

## (undated) DEVICE — SUTURE PDS II SZ 1 L96IN ABSRB VLT TP-1 L65MM 1/2 CIR Z880G

## (undated) DEVICE — 3000CC GUARDIAN II: Brand: GUARDIAN

## (undated) DEVICE — SOLUTION IV 1000ML 0.9% SOD CHL

## (undated) DEVICE — SPONGE LAP 18X18IN STRL -- 5/PK

## (undated) DEVICE — BUTTON SWITCH PENCIL BLADE ELECTRODE, HOLSTER: Brand: EDGE

## (undated) DEVICE — STOCKINETTE TUBE 9X48 -- MEDICHOICE

## (undated) DEVICE — SOLUTION IV 500ML 0.9% SOD CHL FLX CONT

## (undated) DEVICE — (D)PREP SKN CHLRAPRP APPL 26ML -- CONVERT TO ITEM 371833

## (undated) DEVICE — Z DISCONTINUED USE 2744636  DRESSING AQUACEL 14 IN ALG W3.5XL14IN POLYUR FLM CVR W/ HYDRCOLL

## (undated) DEVICE — PACK PROCEDURE SURG TOT KNEE

## (undated) DEVICE — SUTURE MCRYL SZ 2-0 L27IN ABSRB UD CP-1 1 L36MM 1/2 CIR REV Y266H

## (undated) DEVICE — T4 HOOD

## (undated) DEVICE — UTILITY MARKER,BLACK WITH LABELS: Brand: DEVON

## (undated) DEVICE — SYR LR LCK 1ML GRAD NSAF 30ML --

## (undated) DEVICE — GOWN,REINF,POLY,ECL,PP SLV,XL: Brand: MEDLINE

## (undated) DEVICE — DRAPE TWL SURG 16X26IN BLU ORB04] ALLCARE INC]

## (undated) DEVICE — SLIM BODY SKIN STAPLER: Brand: APPOSE ULC

## (undated) DEVICE — STERILE PRESSURE PROTECTOR PAD® FOR DE MAYO UNIVERSAL DISTRACTOR® (10/CASE): Brand: DE MAYO UNIVERSAL DISTRACTOR®

## (undated) DEVICE — BLADE RMR L41MM PAT PILOT H